# Patient Record
Sex: FEMALE | Race: WHITE | NOT HISPANIC OR LATINO | Employment: FULL TIME | URBAN - METROPOLITAN AREA
[De-identification: names, ages, dates, MRNs, and addresses within clinical notes are randomized per-mention and may not be internally consistent; named-entity substitution may affect disease eponyms.]

---

## 2017-01-02 DIAGNOSIS — E78.5 DYSLIPIDEMIA: ICD-10-CM

## 2017-01-03 RX ORDER — ATORVASTATIN CALCIUM 10 MG/1
TABLET, FILM COATED ORAL
Qty: 90 TAB | Refills: 3 | Status: SHIPPED | OUTPATIENT
Start: 2017-01-03 | End: 2017-03-29

## 2017-01-09 ENCOUNTER — NON-PROVIDER VISIT (OUTPATIENT)
Dept: HEALTH INFORMATION MANAGEMENT | Facility: MEDICAL CENTER | Age: 47
End: 2017-01-09
Payer: COMMERCIAL

## 2017-01-09 VITALS — HEIGHT: 63 IN | BODY MASS INDEX: 34.18 KG/M2 | WEIGHT: 192.9 LBS

## 2017-01-09 DIAGNOSIS — E11.9 DIABETES MELLITUS WITHOUT COMPLICATION (HCC): ICD-10-CM

## 2017-01-09 PROCEDURE — 97803 MED NUTRITION INDIV SUBSEQ: CPT | Performed by: DIETITIAN, REGISTERED

## 2017-01-09 NOTE — PROGRESS NOTES
"Nutrition Reassess    1/9/2017    CORRY Garcia   46 y.o.   River: in/out:  0543 - 8589    Subjective:  -Working on fiber/water intake and reports regularity on most days now  -Had restarted going to the gym until she hurt her back last week - eager to restart this soon  -Is getting better with choices of foods and how much she is eating  -Most recent BG's of 120-165 mg/dL, per patient    Anthropometrics/Objective  Filed Vitals:    01/09/17 1527   Height: 1.6 m (5' 3\")   Weight: 87.499 kg (192 lb 14.4 oz)     Body mass index is 34.18 kg/(m^2).    Previous weight/date: -194.1# Change:  - 1.2# (+ 2.9# total)     ReAssesment/Notes:  Dora is doing a great job of thinking through choices and finding out that she can still enjoy life without compromising her health. Her drop in her HbA1c is great because she has done it through improved dietary and exercise habits, rather than through medications.  I have encouraged her to take her Vitamin D supplementation as Vitamin D has very important roles in the body, including DM control.  She will restart exercise at the gym after she sees her chiropractor; she thinks she will be released to walk and she will wait to work out with her  until her back is completely healed, which I agree with.  I showed her how to read part of the food label today, starting with serving size, trans fat, and total CHO content of the foods she is consuming.  At next f/u I will assess how she is feeling with her choices because she admits that she is not completely comfortable with her dietary choices at this time.    Follow-up: 1 month      "

## 2017-01-09 NOTE — MR AVS SNAPSHOT
"        Dora Sheldon   2017 3:00 PM   Non-Provider Visit   MRN: [id number]    Department:  [facility]   Dept Phone:  [phone]    Description:  Female : 1970   Provider:  Howard Marks RD           Reason for Visit     Diabetes     Obesity           Allergies as of 2017     No Known Allergies      Vital Signs     Height Weight Body Mass Index Smoking Status          1.6 m (5' 3\") 87.499 kg (192 lb 14.4 oz) 34.18 kg/m2 Never Smoker         Basic Information     Date Of Birth Sex Race Ethnicity Preferred Language    1970 Female White Non- English      Your appointments     2017  4:00 PM   Follow Up Visit with Howard Marks RD   [facility])    [address]  [address]  [facility] [id number] [phone]           It is the patient's responsibility to check with your Insurance for benefit coverage for visit / visits.  24 hours notice is required for all appointment changes or cancellation.  Please arrive 20 min. before your appointment time  Please bring the following with you: 1)Picture Id 2) Insurance card 3) Completed Forms if New Patient  If scheduled for DIABETES VISIT please also brin) Medications 2) Meter 3) Blood glucose logs 4) Any recent labs if you have them  If scheduled for NUTRITION VISIT please also brin) 2-3 days of detailed food intake logs 2) Blood glucose monitor and blood glucose logs (if you have them)              Problem List              ICD-10-CM Priority Class Noted - Resolved    Essential hypertension I10   Unknown - Present    Dyslipidemia E78.5   Unknown - Present    Diabetes mellitus, type 2 (HCC) E11.9   Unknown - Present    Microalbuminuria R80.9   2014 - Present    Anxiety F41.9   Unknown - Present    TED on CPAP G47.33   3/23/2016 - Present    Vitamin D deficiency E55.9   2016 - Present    Chronic dryness of both eyes H04.123   2016 - Present    Obesity (BMI 30.0-34.9) E66.9   " 12/27/2016 - Present      Health Maintenance        Date Due Completion Dates    IMM HEP B VACCINE (1 of 3 - Primary Series) 1970 ---    IMM DTaP/Tdap/Td Vaccine (1 - Tdap) 8/19/1988 ---    IMM PNEUMOCOCCAL 19-64 (ADULT) MEDIUM RISK SERIES (1 of 1 - PPSV23) 8/19/1988 ---    RETINAL SCREENING 8/25/2016 8/25/2015, 8/28/2014    IMM INFLUENZA (1) 9/1/2017 (Originally 9/1/2016) ---    DIABETES MONOFILAMTENT / LE EXAM 3/22/2017 3/22/2016 (Done), 3/26/2015 (Done), 6/11/2014, 2/12/2014 (Done)    Override on 3/22/2016: Done    Override on 3/26/2015: Done    Override on 2/12/2014: Done    A1C SCREENING 6/23/2017 12/23/2016, 9/20/2016, 3/22/2016, 9/30/2015, 9/30/2015 (Done), 3/17/2015, 9/24/2014, 5/29/2014, 1/23/2014    Override on 9/30/2015: Done    URINE ACR / MICROALBUMIN 9/20/2017 9/20/2016, 3/18/2016, 3/17/2015, 9/24/2014, 5/29/2014    MAMMOGRAM 10/5/2017 10/5/2016 (Done), 8/5/2015 (Done), 8/30/2013 (Done), 1/15/2013 (Done)    Override on 10/5/2016: Done    Override on 8/5/2015: Done    Override on 8/30/2013: Done    Override on 1/15/2013: Done    FASTING LIPID PROFILE 12/23/2017 12/23/2016, 3/18/2016, 3/17/2015, 5/29/2014, 1/23/2014    SERUM CREATININE 12/23/2017 12/23/2016, 3/18/2016, 3/17/2015, 9/24/2014, 5/29/2014, 1/23/2014    PAP SMEAR 10/3/2019 10/3/2016 (Done), 8/10/2013 (Done)    Override on 10/3/2016: Done    Override on 8/10/2013: Done            Current Immunizations     No immunizations on file.      Below and/or attached are the medications your provider expects you to take. Review all of your home medications and newly ordered medications with your provider and/or pharmacist. Follow medication instructions as directed by your provider and/or pharmacist. Please keep your medication list with you and share with your provider. Update the information when medications are discontinued, doses are changed, or new medications (including over-the-counter products) are added; and carry medication information at  all times in the event of emergency situations     Allergies:  No Known Allergies          Medications  Valid as of: January 09, 2017 -  3:28 PM    Generic Name Brand Name Tablet Size Instructions for use    Atorvastatin Calcium (Tab) LIPITOR 10 MG TAKE 1 TAB BY MOUTH EVERY DAY.        Blood Glucose Monitoring Suppl (Misc) Blood Glucose Monitoring Suppl SUPPLIES Test strips order: Test strips for One Touch Ultra 2 meter. Sig: use twice daily and prn ssx high or low sugar #100 RF x 3        Ergocalciferol (Cap) DRISDOL 78937 UNITS Take 1 Cap by mouth every 7 days.        Lancets (Misc) Lancets  1 Each by Does not apply route 3 times a day.        Levonorgestrel (IUD) MIRENA 20 MCG/24HR 1 Each by Intrauterine route Once.        Lisinopril-Hydrochlorothiazide (Tab) PRINZIDE, ZESTORETIC 20-25 MG TAKE 1 TAB BY MOUTH EVERY DAY.        .                 Medicines prescribed today were sent to:     [facility]/PHARMACY #[id number] - [place], [place] - [address]    [address] [place] [address]    Phone: [phone] Fax: [phone]    Open 24 Hours?: No      Medication refill instructions:       If your prescription bottle indicates you have medication refills left, it is not necessary to call your provider’s office. Please contact your pharmacy and they will refill your medication.    If your prescription bottle indicates you do not have any refills left, you may request refills at any time through one of the following ways: The online [email or web address] system (except Urgent Care), by calling your provider’s office, or by asking your pharmacy to contact your provider’s office with a refill request. Medication refills are processed only during regular business hours and may not be available until the next business day. Your provider may request additional information or to have a follow-up visit with you prior to refilling your medication.   *Please Note: Medication refills are assigned a new Rx number when refilled electronically. Your  pharmacy may indicate that no refills were authorized even though a new prescription for the same medication is available at the pharmacy. Please request the medicine by name with the pharmacy before contacting your provider for a refill.           MyChart Access Code: Activation code not generated  Current [employer] Status: Active

## 2017-01-24 RX ORDER — LISINOPRIL AND HYDROCHLOROTHIAZIDE 25; 20 MG/1; MG/1
TABLET ORAL
Qty: 90 TAB | Refills: 3 | Status: SHIPPED | OUTPATIENT
Start: 2017-01-24 | End: 2017-06-01 | Stop reason: SDUPTHER

## 2017-02-13 ENCOUNTER — APPOINTMENT (OUTPATIENT)
Dept: HEALTH INFORMATION MANAGEMENT | Facility: MEDICAL CENTER | Age: 47
End: 2017-02-13
Payer: COMMERCIAL

## 2017-02-17 ENCOUNTER — NON-PROVIDER VISIT (OUTPATIENT)
Dept: HEALTH INFORMATION MANAGEMENT | Facility: MEDICAL CENTER | Age: 47
End: 2017-02-17
Payer: COMMERCIAL

## 2017-02-17 VITALS — BODY MASS INDEX: 33.29 KG/M2 | WEIGHT: 187.9 LBS | HEIGHT: 63 IN

## 2017-02-17 DIAGNOSIS — E11.9 DIABETES MELLITUS WITHOUT COMPLICATION (HCC): ICD-10-CM

## 2017-02-17 PROCEDURE — 97803 MED NUTRITION INDIV SUBSEQ: CPT | Performed by: DIETITIAN, REGISTERED

## 2017-02-17 NOTE — MR AVS SNAPSHOT
Dora Sheldon   2017 3:30 PM   Appointment   MRN: [id number]    Department:  [facility]   Dept Phone:  [phone]    Description:  Female : 1970   Provider:  Howard Marks RD           Allergies as of 2017     No Known Allergies      Vital Signs     Smoking Status                   Never Smoker            Basic Information     Date Of Birth Sex Race Ethnicity Preferred Language    1970 Female White Non- English      Problem List              ICD-10-CM Priority Class Noted - Resolved    Essential hypertension I10   Unknown - Present    Dyslipidemia E78.5   Unknown - Present    Diabetes mellitus, type 2 (CMS-HCC) E11.9   Unknown - Present    Microalbuminuria R80.9   2014 - Present    Anxiety F41.9   Unknown - Present    TED on CPAP G47.33   3/23/2016 - Present    Vitamin D deficiency E55.9   2016 - Present    Chronic dryness of both eyes H04.123   2016 - Present    Obesity (BMI 30.0-34.9) E66.9   2016 - Present      Health Maintenance        Date Due Completion Dates    IMM HEP B VACCINE (1 of 3 - Primary Series) 1970 ---    IMM DTaP/Tdap/Td Vaccine (1 - Tdap) 1988 ---    IMM PNEUMOCOCCAL 19-64 (ADULT) MEDIUM RISK SERIES (1 of 1 - PPSV23) 1988 ---    RETINAL SCREENING 2016, 2014    IMM INFLUENZA (1) 2017 (Originally 2016) ---    DIABETES MONOFILAMENT / LE EXAM 3/22/2017 3/22/2016 (Done), 3/26/2015 (Done), 2014, 2014 (Done)    Override on 3/22/2016: Done    Override on 3/26/2015: Done    Override on 2014: Done    A1C SCREENING 2016, 2016, 3/22/2016, 2015, 2015 (Done), 3/17/2015, 2014, 2014, 2014    Override on 2015: Done    URINE ACR / MICROALBUMIN 2017, 3/18/2016, 3/17/2015, 2014, 2014    MAMMOGRAM 10/5/2017 10/5/2016 (Done), 2015 (Done), 2013 (Done), 1/15/2013 (Done)    Override on 10/5/2016: Done    Override on 8/5/2015: Done    Override on 8/30/2013: Done    Override on 1/15/2013: Done    FASTING LIPID PROFILE 12/23/2017 12/23/2016, 3/18/2016, 3/17/2015, 5/29/2014, 1/23/2014    SERUM CREATININE 12/23/2017 12/23/2016, 3/18/2016, 3/17/2015, 9/24/2014, 5/29/2014, 1/23/2014    PAP SMEAR 10/3/2019 10/3/2016 (Done), 8/10/2013 (Done)    Override on 10/3/2016: Done    Override on 8/10/2013: Done            Current Immunizations     No immunizations on file.      Below and/or attached are the medications your provider expects you to take. Review all of your home medications and newly ordered medications with your provider and/or pharmacist. Follow medication instructions as directed by your provider and/or pharmacist. Please keep your medication list with you and share with your provider. Update the information when medications are discontinued, doses are changed, or new medications (including over-the-counter products) are added; and carry medication information at all times in the event of emergency situations     Allergies:  No Known Allergies          Medications  Valid as of: February 17, 2017 -  3:59 PM    Generic Name Brand Name Tablet Size Instructions for use    Atorvastatin Calcium (Tab) LIPITOR 10 MG TAKE 1 TAB BY MOUTH EVERY DAY.        Blood Glucose Monitoring Suppl (Misc) Blood Glucose Monitoring Suppl SUPPLIES Test strips order: Test strips for One Touch Ultra 2 meter. Sig: use twice daily and prn ssx high or low sugar #100 RF x 3        Ergocalciferol (Cap) DRISDOL 37357 UNITS Take 1 Cap by mouth every 7 days.        Lancets (Misc) Lancets  1 Each by Does not apply route 3 times a day.        Levonorgestrel (IUD) MIRENA 20 MCG/24HR 1 Each by Intrauterine route Once.        Lisinopril-Hydrochlorothiazide (Tab) PRINZIDE, ZESTORETIC 20-25 MG TAKE 1 TAB BY MOUTH EVERY DAY.        .                 Medicines prescribed today were sent to:     [facility]/PHARMACY #[id number] - [place], [place] - [address] [place] [address]    [address] [place]  [place] [address]    Phone: [phone] Fax: [phone]    Open 24 Hours?: No      Medication refill instructions:       If your prescription bottle indicates you have medication refills left, it is not necessary to call your provider’s office. Please contact your pharmacy and they will refill your medication.    If your prescription bottle indicates you do not have any refills left, you may request refills at any time through one of the following ways: The online [employer] system (except Urgent Care), by calling your provider’s office, or by asking your pharmacy to contact your provider’s office with a refill request. Medication refills are processed only during regular business hours and may not be available until the next business day. Your provider may request additional information or to have a follow-up visit with you prior to refilling your medication.   *Please Note: Medication refills are assigned a new Rx number when refilled electronically. Your pharmacy may indicate that no refills were authorized even though a new prescription for the same medication is available at the pharmacy. Please request the medicine by name with the pharmacy before contacting your provider for a refill.           [employer] Access Code: Activation code not generated  Current [employer] Status: Active

## 2017-02-18 NOTE — PROGRESS NOTES
"Nutrition Reassess    2/17/2017    CORRY Garcia   46 y.o.   Time in/out:  1537 - 1558    Subjective:  -Exercising on 2 days each week and wants to increase activity further soon  -States she is getting better and thinking through her choices more    Anthropometrics/Objective  Filed Vitals:    02/17/17 1643   Height: 1.6 m (5' 3\")   Weight: 85.231 kg (187 lb 14.4 oz)     Body mass index is 33.29 kg/(m^2).    Previous weight/date: 192.9#  Change:  - 3.2# (- 0.3# total)     ReAssesment/Notes:  I want Dora to stay the course at this time.  She assesses what she is eating and makes better choices when she knows that she needs to and I want that to continue.  Her exercise level can be increased and she is willing to increase her activity to 3 days/week to help add more \"medicine\" for controlling her BG's.  I want to see her again after her next appointment with her PCP, where she will have new labs drawn.    Follow-up: 4-6 weeks      "

## 2017-03-29 ENCOUNTER — OFFICE VISIT (OUTPATIENT)
Dept: MEDICAL GROUP | Facility: PHYSICIAN GROUP | Age: 47
End: 2017-03-29
Payer: COMMERCIAL

## 2017-03-29 VITALS
HEIGHT: 63 IN | OXYGEN SATURATION: 98 % | RESPIRATION RATE: 16 BRPM | WEIGHT: 190 LBS | HEART RATE: 81 BPM | SYSTOLIC BLOOD PRESSURE: 120 MMHG | DIASTOLIC BLOOD PRESSURE: 74 MMHG | BODY MASS INDEX: 33.66 KG/M2 | TEMPERATURE: 97.3 F

## 2017-03-29 DIAGNOSIS — E11.9 TYPE 2 DIABETES MELLITUS WITHOUT COMPLICATION, WITHOUT LONG-TERM CURRENT USE OF INSULIN (HCC): ICD-10-CM

## 2017-03-29 DIAGNOSIS — E55.9 VITAMIN D DEFICIENCY: ICD-10-CM

## 2017-03-29 LAB
HBA1C MFR BLD: 7.3 % (ref ?–5.8)
INT CON NEG: ABNORMAL
INT CON POS: ABNORMAL

## 2017-03-29 PROCEDURE — 83036 HEMOGLOBIN GLYCOSYLATED A1C: CPT | Performed by: NURSE PRACTITIONER

## 2017-03-29 PROCEDURE — 99213 OFFICE O/P EST LOW 20 MIN: CPT | Performed by: NURSE PRACTITIONER

## 2017-03-29 RX ORDER — ATORVASTATIN CALCIUM 10 MG/1
TABLET, FILM COATED ORAL
COMMUNITY
Start: 2017-01-03 | End: 2017-03-29

## 2017-03-29 RX ORDER — ATORVASTATIN CALCIUM 10 MG/1
TABLET, FILM COATED ORAL
COMMUNITY
Start: 2017-03-02 | End: 2018-01-28

## 2017-03-29 ASSESSMENT — PATIENT HEALTH QUESTIONNAIRE - PHQ9: CLINICAL INTERPRETATION OF PHQ2 SCORE: 0

## 2017-03-29 NOTE — PROGRESS NOTES
Subjective:     Chief Complaint   Patient presents with   • Follow-Up     3 months       HPI  Dora Sheldon is a 46 y.o. female here today for routine 3 month follow up.     Vitamin D deficiency  She has completed the total 12 weeks of 50,000 units daily.    Ref. Range 12/23/2016 10:40   25-Hydroxy   Vitamin D 25 Latest Ref Range:  ng/mL 10 (L)       Diabetes mellitus, type 2  Established problem but is still not at goal. Patient has been trying to manage symptoms with her own efforts for the past 6 months through nutrition counseling, but her A1c is still greater than 7. Monitors glucose once daily. She continues to follow nutritionist Genaro who has tiffanie helping her with nutrition. She is very sedentary with her job. She exercises here and there.  Denies any polyuria, polydipsia, polyphagia, blurred or cloudy vision, rash or thrush, or numbness or tingling of feet. Last dilated retinal eye exam was November 2016     Ref. Range 9/20/2016 07:19 12/23/2016 10:40   Glycohemoglobin Latest Ref Range: 0.0-5.6 % 7.8 (H) 7.1 (H)   Estim. Avg Glu Latest Units: mg/dL 177 157          Diagnoses of Type 2 diabetes mellitus without complication, without long-term current use of insulin ([facility]) and Vitamin D deficiency were pertinent to this visit.    Allergies: Review of patient's allergies indicates no known allergies.  Current medicines (including changes today)  Current Outpatient Prescriptions   Medication Sig Dispense Refill   • Cholecalciferol (VITAMIN D3) 5000 UNITS Cap Take 1 Cap by mouth every day. 90 Cap 3   • metformin (GLUCOPHAGE) 500 MG Tab Take 1 Tab by mouth every day. 90 Tab 3   • lisinopril-hydrochlorothiazide (PRINZIDE, ZESTORETIC) 20-25 MG per tablet TAKE 1 TAB BY MOUTH EVERY DAY. 90 Tab 3   • Blood Glucose Monitoring Suppl SUPPLIES Misc Test strips order: Test strips for One Touch Ultra 2 meter. Sig: use twice daily and prn ssx high or low sugar #100 RF x 3 100 Each 3   • Lancets Misc 1 Each by Does not  "apply route 3 times a day. 100 Each 2   • levonorgestrel (MIRENA) 20 MCG/24HR IUD 1 Each by Intrauterine route Once.     • atorvastatin (LIPITOR) 10 MG Tab        No current facility-administered medications for this visit.       She  has a past medical history of HTN (hypertension); Dyslipidemia; Diabetes mellitus, type 2 (CMS-HCC); and Anxiety.    Health Maintenance: Is ill today and would like to wait until next visit for vaccines    ROS  As stated in HPI      Objective:     Blood pressure 120/74, pulse 81, temperature 36.3 °C (97.3 °F), resp. rate 16, height 1.6 m (5' 3\"), weight 86.183 kg (190 lb), SpO2 98 %. Body mass index is 33.67 kg/(m^2).  Physical Exam:  General: Alert, oriented, in no acute distress.  Eye contact is good, speech goal directed, affect calm  Oral mucous membranes pink and moist with no lesions. Oropharynx without erythema, or exudate.   Ext: no edema, normal color and temperature.   Monofilament testing with a 10 gram force: sensation intact: intact bilaterally  Visual Inspection: Feet without maceration, ulcers, fissures.  Pedal pulses: intact bilaterally    Results for PINO TAMEZ (MRN [id number]) as of 3/29/2017 09:15   Ref. Range 3/29/2017 08:47   Glycohemoglobin Unknown 7.3       Assessment and Plan:   The following treatment plan was discussed  1. Type 2 diabetes mellitus without complication, without long-term current use of insulin ([facility])  not controlled. Start metformin 500 mg once daily. Continue statin, ACEI, nutrition counseling, and encouraged increasing exercise. Educated patient on disease process and ways to reverse the insulin resistance she is experiencing  POCT A1C    metformin (GLUCOPHAGE) 500 MG Tab   2. Vitamin D deficiency  switch to OTC vitamin D 5000 units daily  Cholecalciferol (VITAMIN D3) 5000 UNITS Cap       Followup: Return in about 3 months (around 6/29/2017). sooner should new symptoms or problems arise.           "

## 2017-03-29 NOTE — MR AVS SNAPSHOT
"Dora Sheldon   3/29/2017 8:20 AM   Office Visit   MRN: [id number]    Department:  [facility]   Dept Phone:  [phone]    Description:  Female : 1970   Provider:  CORRY Garcia           Reason for Visit     Follow-Up 3 months      Allergies as of 3/29/2017     No Known Allergies      You were diagnosed with     Type 2 diabetes mellitus without complication, without long-term current use of insulin (CMS-HCC)   [7754976]       Vitamin D deficiency   [2834141]         Vital Signs     Blood Pressure Pulse Temperature Respirations Height Weight    120/74 mmHg 81 36.3 °C (97.3 °F) 16 1.6 m (5' 3\") 86.183 kg (190 lb)    Body Mass Index Oxygen Saturation Smoking Status             33.67 kg/m2 98% Never Smoker          Basic Information     Date Of Birth Sex Race Ethnicity Preferred Language    1970 Female White Non- English      Your appointments     2017  8:20 AM   Established Patient with CORRY Garcia   [address] [address]   [phone]           You will be receiving a confirmation call a few days before your appointment from our automated call confirmation system.              Problem List              ICD-10-CM Priority Class Noted - Resolved    Essential hypertension I10   Unknown - Present    Dyslipidemia E78.5   Unknown - Present    Diabetes mellitus, type 2 (CMS-HCC) E11.9   Unknown - Present    Microalbuminuria R80.9   2014 - Present    Anxiety F41.9   Unknown - Present    TED on CPAP G47.33   3/23/2016 - Present    Vitamin D deficiency E55.9   2016 - Present    Chronic dryness of both eyes H04.123   2016 - Present    Obesity (BMI 30.0-34.9) E66.9   2016 - Present      Health Maintenance        Date Due Completion Dates    IMM HEP B VACCINE (1 of 3 - Primary Series) 1970 ---    IMM DTaP/Tdap/Td Vaccine (1 - Tdap) 1988 ---    IMM PNEUMOCOCCAL 19-64 (ADULT) MEDIUM RISK " SERIES (1 of 1 - PPSV23) 8/19/1988 ---    RETINAL SCREENING 8/25/2016 8/25/2015, 8/28/2014    DIABETES MONOFILAMENT / LE EXAM 3/22/2017 3/22/2016 (Done), 3/26/2015 (Done), 6/11/2014, 2/12/2014 (Done)    Override on 3/22/2016: Done    Override on 3/26/2015: Done    Override on 2/12/2014: Done    IMM INFLUENZA (1) 9/1/2017 (Originally 9/1/2016) ---    A1C SCREENING 6/23/2017 12/23/2016, 9/20/2016, 3/22/2016, 9/30/2015, 9/30/2015 (Done), 3/17/2015, 9/24/2014, 5/29/2014, 1/23/2014    Override on 9/30/2015: Done    URINE ACR / MICROALBUMIN 9/20/2017 9/20/2016, 3/18/2016, 3/17/2015, 9/24/2014, 5/29/2014    MAMMOGRAM 10/5/2017 10/5/2016 (Done), 8/5/2015 (Done), 8/30/2013 (Done), 1/15/2013 (Done)    Override on 10/5/2016: Done    Override on 8/5/2015: Done    Override on 8/30/2013: Done    Override on 1/15/2013: Done    FASTING LIPID PROFILE 12/23/2017 12/23/2016, 3/18/2016, 3/17/2015, 5/29/2014, 1/23/2014    SERUM CREATININE 12/23/2017 12/23/2016, 3/18/2016, 3/17/2015, 9/24/2014, 5/29/2014, 1/23/2014    PAP SMEAR 10/3/2019 10/3/2016 (Done), 8/10/2013 (Done)    Override on 10/3/2016: Done    Override on 8/10/2013: Done            Results     POCT A1C      Component    Glycohemoglobin    7.3    Internal Control Negative    Valid    Internal Control Positive    Valid                        Current Immunizations     No immunizations on file.      Below and/or attached are the medications your provider expects you to take. Review all of your home medications and newly ordered medications with your provider and/or pharmacist. Follow medication instructions as directed by your provider and/or pharmacist. Please keep your medication list with you and share with your provider. Update the information when medications are discontinued, doses are changed, or new medications (including over-the-counter products) are added; and carry medication information at all times in the event of emergency situations     Allergies:  No Known  Allergies          Medications  Valid as of: March 29, 2017 -  8:58 AM    Generic Name Brand Name Tablet Size Instructions for use    Atorvastatin Calcium (Tab) LIPITOR 10 MG         Blood Glucose Monitoring Suppl (Misc) Blood Glucose Monitoring Suppl SUPPLIES Test strips order: Test strips for One Touch Ultra 2 meter. Sig: use twice daily and prn ssx high or low sugar #100 RF x 3        Cholecalciferol (Cap) vitamin D3 5000 UNITS Take 1 Cap by mouth every day.        Lancets (Misc) Lancets  1 Each by Does not apply route 3 times a day.        Levonorgestrel (IUD) MIRENA 20 MCG/24HR 1 Each by Intrauterine route Once.        Lisinopril-Hydrochlorothiazide (Tab) PRINZIDE, ZESTORETIC 20-25 MG TAKE 1 TAB BY MOUTH EVERY DAY.        MetFORMIN HCl (Tab) GLUCOPHAGE 500 MG Take 1 Tab by mouth every day.        .                 Medicines prescribed today were sent to:     [facility]/PHARMACY #[id number] - [place], [place] - [address]    [address] [place] [address]    Phone: [phone] Fax: [phone]    Open 24 Hours?: No      Medication refill instructions:       If your prescription bottle indicates you have medication refills left, it is not necessary to call your provider’s office. Please contact your pharmacy and they will refill your medication.    If your prescription bottle indicates you do not have any refills left, you may request refills at any time through one of the following ways: The online [employer] system (except Urgent Care), by calling your provider’s office, or by asking your pharmacy to contact your provider’s office with a refill request. Medication refills are processed only during regular business hours and may not be available until the next business day. Your provider may request additional information or to have a follow-up visit with you prior to refilling your medication.   *Please Note: Medication refills are assigned a new Rx number when refilled electronically. Your pharmacy may indicate that no  refills were authorized even though a new prescription for the same medication is available at the pharmacy. Please request the medicine by name with the pharmacy before contacting your provider for a refill.           [employer] Access Code: Activation code not generated  Current [employer] Status: Active

## 2017-03-29 NOTE — ASSESSMENT & PLAN NOTE
Established problem but is still not at goal. Patient has been trying to manage symptoms with her own efforts for the past 6 months through nutrition counseling, but her A1c is still greater than 7. Monitors glucose once daily. She continues to follow nutritionist Genaro who has tiffanie helping her with nutrition. She is very sedentary with her job. She exercises here and there.  Denies any polyuria, polydipsia, polyphagia, blurred or cloudy vision, rash or thrush, or numbness or tingling of feet. Last dilated retinal eye exam was November 2016     Ref. Range 9/20/2016 07:19 12/23/2016 10:40   Glycohemoglobin Latest Ref Range: 0.0-5.6 % 7.8 (H) 7.1 (H)   Estim. Avg Glu Latest Units: mg/dL 177 157

## 2017-03-29 NOTE — ASSESSMENT & PLAN NOTE
She has completed the total 12 weeks of 50,000 units daily.    Ref. Range 12/23/2016 10:40   25-Hydroxy   Vitamin D 25 Latest Ref Range:  ng/mL 10 (L)

## 2017-06-01 RX ORDER — LISINOPRIL AND HYDROCHLOROTHIAZIDE 25; 20 MG/1; MG/1
TABLET ORAL
Qty: 90 TAB | Refills: 3 | Status: SHIPPED | OUTPATIENT
Start: 2017-06-01 | End: 2018-05-29 | Stop reason: SDUPTHER

## 2017-06-29 ENCOUNTER — OFFICE VISIT (OUTPATIENT)
Dept: MEDICAL GROUP | Facility: PHYSICIAN GROUP | Age: 47
End: 2017-06-29
Payer: COMMERCIAL

## 2017-06-29 VITALS
HEIGHT: 63 IN | WEIGHT: 188 LBS | HEART RATE: 66 BPM | SYSTOLIC BLOOD PRESSURE: 118 MMHG | TEMPERATURE: 98 F | RESPIRATION RATE: 12 BRPM | BODY MASS INDEX: 33.31 KG/M2 | OXYGEN SATURATION: 97 % | DIASTOLIC BLOOD PRESSURE: 70 MMHG

## 2017-06-29 DIAGNOSIS — E11.9 TYPE 2 DIABETES MELLITUS WITHOUT COMPLICATION, WITHOUT LONG-TERM CURRENT USE OF INSULIN (HCC): ICD-10-CM

## 2017-06-29 DIAGNOSIS — F90.9 HYPERACTIVITY (BEHAVIOR): ICD-10-CM

## 2017-06-29 LAB
HBA1C MFR BLD: 6.7 % (ref ?–5.8)
INT CON NEG: ABNORMAL
INT CON POS: ABNORMAL

## 2017-06-29 PROCEDURE — 99213 OFFICE O/P EST LOW 20 MIN: CPT | Performed by: NURSE PRACTITIONER

## 2017-06-29 PROCEDURE — 83036 HEMOGLOBIN GLYCOSYLATED A1C: CPT | Performed by: NURSE PRACTITIONER

## 2017-06-29 NOTE — MR AVS SNAPSHOT
"Dora Sheldon   2017 8:20 AM   Office Visit   MRN: [id number]    Department:  [facility]   Dept Phone:  [phone]    Description:  Female : 1970   Provider:  CORRY Garcia           Reason for Visit     Follow-Up 3 months      Allergies as of 2017     No Known Allergies      You were diagnosed with     Type 2 diabetes mellitus without complication, without long-term current use of insulin (CMS-HCC)   [6254618]       Hyperactivity (behavior)   [543000]         Vital Signs     Blood Pressure Pulse Temperature Respirations Height Weight    118/70 mmHg 66 36.7 °C (98 °F) 12 1.6 m (5' 3\") 85.276 kg (188 lb)    Body Mass Index Oxygen Saturation Smoking Status             33.31 kg/m2 97% Never Smoker          Basic Information     Date Of Birth Sex Race Ethnicity Preferred Language    1970 Female White Non- English      Your appointments     Sep 20, 2017  7:40 AM   Established Patient with CORRY Garcia   [address] [address] [phone]           You will be receiving a confirmation call a few days before your appointment from our automated call confirmation system.              Problem List              ICD-10-CM Priority Class Noted - Resolved    Essential hypertension I10   Unknown - Present    Dyslipidemia E78.5   Unknown - Present    Diabetes mellitus, type 2 (CMS-HCC) E11.9   Unknown - Present    Microalbuminuria R80.9   2014 - Present    Hyperactivity (behavior) F90.9   Unknown - Present    TED on CPAP G47.33, Z99.89   3/23/2016 - Present    Vitamin D deficiency E55.9   2016 - Present    Chronic dryness of both eyes H04.123   2016 - Present    Obesity (BMI 30.0-34.9) E66.9   2016 - Present      Health Maintenance        Date Due Completion Dates    IMM HEP B VACCINE (1 of 3 - Primary Series) 1970 ---    IMM DTaP/Tdap/Td Vaccine (1 - Tdap) 1988 ---    IMM PNEUMOCOCCAL " 19-64 (ADULT) MEDIUM RISK SERIES (1 of 1 - PPSV23) 8/19/1988 ---    RETINAL SCREENING 8/25/2016 8/25/2015, 8/28/2014    URINE ACR / MICROALBUMIN 9/20/2017 9/20/2016, 3/18/2016, 3/17/2015, 9/24/2014, 5/29/2014    A1C SCREENING 9/29/2017 3/29/2017, 12/23/2016, 9/20/2016, 3/22/2016, 9/30/2015, 9/30/2015 (Done), 3/17/2015, 9/24/2014, 5/29/2014, 1/23/2014    Override on 9/30/2015: Done    MAMMOGRAM 10/5/2017 10/5/2016 (Done), 8/5/2015 (Done), 8/30/2013 (Done), 1/15/2013 (Done)    Override on 10/5/2016: Done    Override on 8/5/2015: Done    Override on 8/30/2013: Done    Override on 1/15/2013: Done    FASTING LIPID PROFILE 12/23/2017 12/23/2016, 3/18/2016, 3/17/2015, 5/29/2014, 1/23/2014    SERUM CREATININE 12/23/2017 12/23/2016, 3/18/2016, 3/17/2015, 9/24/2014, 5/29/2014, 1/23/2014    DIABETES MONOFILAMENT / LE EXAM 3/29/2018 3/29/2017 (Done), 3/22/2016 (Done), 3/26/2015 (Done), 6/11/2014, 2/12/2014 (Done)    Override on 3/29/2017: Done    Override on 3/22/2016: Done    Override on 3/26/2015: Done    Override on 2/12/2014: Done    PAP SMEAR 10/3/2019 10/3/2016 (Done), 8/10/2013 (Done)    Override on 10/3/2016: Done    Override on 8/10/2013: Done            Results     POCT Hemoglobin A1C      Component    Glycohemoglobin    6.7    Internal Control Negative    Valid    Internal Control Positive    Valid                        Current Immunizations     No immunizations on file.      Below and/or attached are the medications your provider expects you to take. Review all of your home medications and newly ordered medications with your provider and/or pharmacist. Follow medication instructions as directed by your provider and/or pharmacist. Please keep your medication list with you and share with your provider. Update the information when medications are discontinued, doses are changed, or new medications (including over-the-counter products) are added; and carry medication information at all times in the event of emergency  situations     Allergies:  No Known Allergies          Medications  Valid as of: June 29, 2017 - 11:27 AM    Generic Name Brand Name Tablet Size Instructions for use    Atorvastatin Calcium (Tab) LIPITOR 10 MG         Blood Glucose Monitoring Suppl (Misc) Blood Glucose Monitoring Suppl SUPPLIES Test strips order: Test strips for One Touch Ultra 2 meter. Sig: use twice daily and prn ssx high or low sugar #100 RF x 3        Cholecalciferol (Cap) vitamin D3 5000 UNITS Take 1 Cap by mouth every day.        Lancets (Misc) Lancets  1 Each by Does not apply route 3 times a day.        Levonorgestrel (IUD) MIRENA 20 MCG/24HR 1 Each by Intrauterine route Once.        Lisinopril-Hydrochlorothiazide (Tab) PRINZIDE, ZESTORETIC 20-25 MG TAKE 1 TAB BY MOUTH EVERY DAY.        MetFORMIN HCl (Tab) GLUCOPHAGE 500 MG Take 1 Tab by mouth every day.        .                 Medicines prescribed today were sent to:     [facility]ARMACY #[id number] - [place], [place] - [address]    [address] [place] [address]    Phone: [phone] Fax: [phone]    Open 24 Hours?: No      Medication refill instructions:       If your prescription bottle indicates you have medication refills left, it is not necessary to call your provider’s office. Please contact your pharmacy and they will refill your medication.    If your prescription bottle indicates you do not have any refills left, you may request refills at any time through one of the following ways: The online [employer] system (except Urgent Care), by calling your provider’s office, or by asking your pharmacy to contact your provider’s office with a refill request. Medication refills are processed only during regular business hours and may not be available until the next business day. Your provider may request additional information or to have a follow-up visit with you prior to refilling your medication.   *Please Note: Medication refills are assigned a new Rx number when refilled electronically.  Your pharmacy may indicate that no refills were authorized even though a new prescription for the same medication is available at the pharmacy. Please request the medicine by name with the pharmacy before contacting your provider for a refill.           MyChart Access Code: Activation code not generated  Current [employer] Status: Active

## 2017-06-29 NOTE — PROGRESS NOTES
Subjective:     Chief Complaint   Patient presents with   • Follow-Up     3 months       HPI  Dora Sheldon is a 46 y.o. female here today for DM focused f/u    Diabetes mellitus, type 2  Ongoing issue that has improved. Managed with metformin 500 mg daily. AM fasting glucose 162-180, PM before dinner is generally lower. No hypoglycemia episodes, but when sugar is <100, she does feel nauseated and shaky.   She is very sedentary with her job, but has been walking and bicycle riding to get her moving. She does admit to 1-2 drinks a night, often wine. She has started [employer] nutrition supplementation with cinnamix to help with sugar cravings. She does follow nutritionist regularly still who has helped a lot.   Denies any polyuria, polydipsia, polyphagia, blurred or cloudy vision, rash or thrush, or numbness or tingling of feet. Last dilated retinal eye exam was August 2016    Statin: yes  ACEI: yes      Ref. Range 9/20/2016 07:19 12/23/2016 10:40 3/29/2017 08:47 6/29/2017 08:42   Glycohemoglobin Unknown 7.8 (H) 7.1 (H) 7.3 6.7   Estim. Avg Glu Latest Units: mg/dL 177 157         Hyperactivity (behavior)  Ongoing problem. Patient reports a very stressful job. She often commits to social activities where she has to coordinate the events. She does not have any outlet for stress relief. Denies anxiety, but feels mind racing frequently          Diagnoses of Type 2 diabetes mellitus without complication, without long-term current use of insulin (CMS-[facility]) and Hyperactivity (behavior) were pertinent to this visit.    Allergies: Review of patient's allergies indicates no known allergies.  Current medicines (including changes today)  Current Outpatient Prescriptions   Medication Sig Dispense Refill   • lisinopril-hydrochlorothiazide (PRINZIDE, ZESTORETIC) 20-25 MG per tablet TAKE 1 TAB BY MOUTH EVERY DAY. 90 Tab 3   • atorvastatin (LIPITOR) 10 MG Tab      • Cholecalciferol (VITAMIN D3) 5000 UNITS Cap Take 1 Cap by mouth every day. 90  "Cap 3   • metformin (GLUCOPHAGE) 500 MG Tab Take 1 Tab by mouth every day. 90 Tab 3   • levonorgestrel (MIRENA) 20 MCG/24HR IUD 1 Each by Intrauterine route Once.     • Blood Glucose Monitoring Suppl SUPPLIES Misc Test strips order: Test strips for One Touch Ultra 2 meter. Sig: use twice daily and prn ssx high or low sugar #100 RF x 3 100 Each 3   • Lancets Misc 1 Each by Does not apply route 3 times a day. 100 Each 2     No current facility-administered medications for this visit.       She  has a past medical history of HTN (hypertension); Dyslipidemia; Diabetes mellitus, type 2 (CMS-[facility]); and Anxiety.    Health Maintenance: vaccinations due, declines today     ROS  As stated in HPI and additionally  General: No fatigue or insomnia  Psych: No depression      Objective:     Blood pressure 118/70, pulse 66, temperature 36.7 °C (98 °F), resp. rate 12, height 1.6 m (5' 3\"), weight 85.276 kg (188 lb), SpO2 97 %. Body mass index is 33.31 kg/(m^2).  Physical Exam:  General: Alert, oriented, in no acute distress.  Eye contact is good, speech goal directed, affect talkative, hyperactive (her baseline)  Gross hearing intact.  Oral mucous membranes pink and moist with no lesions. Oropharynx without erythema, or exudate.   Feet without dryness, lesions, or ulcers  Gait steady.     Assessment and Plan:   Assessment/Plan:  1. Type 2 diabetes mellitus without complication, without long-term current use of insulin (CMS-HCC)  Improving. Very motivated with diet changes, but still sedentary for the most part. Enc exercise. Continue nutrition counseling. Change metformin to 250 mg BID. Enc management of stress levels to help manage sugars. F/u A1C in 3 months   - POCT Hemoglobin A1C    2. Hyperactivity (behavior)  Ongoing problem. Discussed relaxation techniques, stress relief outlets, increasing exercise        Follow up:  Return in about 3 months (around 9/29/2017).    Educated in proper administration of medication(s) ordered " today including safety, possible SE, risks, benefits, rationale and alternatives to therapy.   Supportive care, differential diagnoses, and indications for immediate follow-up discussed with patient.    Pathogenesis of diagnosis discussed including typical length and natural progression.    Instructed to return to clinic or nearest emergency department for any change in condition, further concerns, or worsening of symptoms.  Patient states understanding of the plan of care and discharge instructions.      Please note that this dictation was created using voice recognition software. I have made every reasonable attempt to correct obvious errors, but I expect that there are errors of grammar and possibly content that I did not discover before finalizing the note.    Followup: Return in about 3 months (around 9/29/2017). sooner should new symptoms or problems arise.

## 2017-06-29 NOTE — ASSESSMENT & PLAN NOTE
Ongoing issue that has improved. Managed with metformin 500 mg daily. AM fasting glucose 162-180, PM before dinner is generally lower. No hypoglycemia episodes, but when sugar is <100, she does feel nauseated and shaky.   She is very sedentary with her job, but has been walking and bicycle riding to get her moving. She does admit to 1-2 drinks a night, often wine. She has started Sol[employer] nutrition supplementation with cinnamix to help with sugar cravings. She does follow nutritionist regularly still who has helped a lot.   Denies any polyuria, polydipsia, polyphagia, blurred or cloudy vision, rash or thrush, or numbness or tingling of feet. Last dilated retinal eye exam was August 2016    Statin: yes  ACEI: yes      Ref. Range 9/20/2016 07:19 12/23/2016 10:40 3/29/2017 08:47 6/29/2017 08:42   Glycohemoglobin Unknown 7.8 (H) 7.1 (H) 7.3 6.7   Estim. Avg Glu Latest Units: mg/dL 177 157

## 2017-06-29 NOTE — ASSESSMENT & PLAN NOTE
Ongoing problem. Patient reports a very stressful job. She often commits to social activities where she has to coordinate the events. She does not have any outlet for stress relief. Denies anxiety, but feels mind racing frequently

## 2017-08-30 ENCOUNTER — PATIENT MESSAGE (OUTPATIENT)
Dept: MEDICAL GROUP | Facility: PHYSICIAN GROUP | Age: 47
End: 2017-08-30

## 2017-09-18 ENCOUNTER — PATIENT MESSAGE (OUTPATIENT)
Dept: MEDICAL GROUP | Facility: PHYSICIAN GROUP | Age: 47
End: 2017-09-18

## 2017-09-20 ENCOUNTER — OFFICE VISIT (OUTPATIENT)
Dept: MEDICAL GROUP | Facility: PHYSICIAN GROUP | Age: 47
End: 2017-09-20
Payer: COMMERCIAL

## 2017-09-20 VITALS
HEART RATE: 66 BPM | SYSTOLIC BLOOD PRESSURE: 130 MMHG | DIASTOLIC BLOOD PRESSURE: 76 MMHG | RESPIRATION RATE: 12 BRPM | OXYGEN SATURATION: 98 % | HEIGHT: 63 IN | TEMPERATURE: 98 F | WEIGHT: 188 LBS | BODY MASS INDEX: 33.31 KG/M2

## 2017-09-20 DIAGNOSIS — I10 ESSENTIAL HYPERTENSION: ICD-10-CM

## 2017-09-20 DIAGNOSIS — K92.1 BLOOD IN STOOL: ICD-10-CM

## 2017-09-20 DIAGNOSIS — E11.9 TYPE 2 DIABETES MELLITUS WITHOUT COMPLICATION, WITHOUT LONG-TERM CURRENT USE OF INSULIN (HCC): ICD-10-CM

## 2017-09-20 DIAGNOSIS — E55.9 VITAMIN D DEFICIENCY: ICD-10-CM

## 2017-09-20 LAB
HBA1C MFR BLD: 7.1 % (ref ?–5.8)
INT CON NEG: ABNORMAL
INT CON POS: ABNORMAL

## 2017-09-20 PROCEDURE — 83036 HEMOGLOBIN GLYCOSYLATED A1C: CPT | Performed by: NURSE PRACTITIONER

## 2017-09-20 PROCEDURE — 99214 OFFICE O/P EST MOD 30 MIN: CPT | Performed by: NURSE PRACTITIONER

## 2017-09-20 RX ORDER — ATORVASTATIN CALCIUM 10 MG/1
TABLET, FILM COATED ORAL
COMMUNITY
Start: 2017-07-15 | End: 2017-09-20

## 2017-09-20 NOTE — ASSESSMENT & PLAN NOTE
She continues to take vitamin D 5,000 units daily. She is due for labs for monitoring as last level was very low at 10.

## 2017-09-20 NOTE — PROGRESS NOTES
Subjective:     Chief Complaint   Patient presents with   • Follow-Up     3 months       HPI  Dora Sheldon is a 47 y.o. female here today for routine f/u. Concern today is blood in stool sometimes.  Started about one month ago, occurring 1-2 days/week. She finds bright new blood on stool when she wipes. There is no pain with bowel movement or pain with wiping. Has been using Metamucil regularly and has increased fiber intake significantly. Denies any weight loss, F/C, fatigue or malaise. No lower abdominal pain or cramping, diarrhea, melena, or change in bowel habits. No family hx of inflammatory bowel disease.     Essential hypertension  Chronic problem well controlled on current medications. Does not monitor blood pressure at home. Denies any severe headache, dizziness, vision changes, chest pain, BRANDT, palpitations, or lower extremity edema      Diabetes mellitus, type 2  Controlled: yes    Medications: metformin 250 mg BID     Glucose at home: fasting 110-146 (mostly 136-146)    Hypoglycemia episodes: none, but when glucose 110, she feels hypoglycemia      Statin: yes   ACEI/ARB: yes  Aspirin: no     Exercise: increasing exercise recently as she is finding days she is sedentary, sugars are higher     Denies any polyuria, polydipsia, polyphagia, blurred or cloudy vision, rash or thrush, or numbness or tingling of feet. Last dilated retinal eye exam was August 2017      Ref. Range 12/23/2016 10:40 3/29/2017 08:47 6/29/2017 08:42 9/20/2017 08:01   Glycohemoglobin Unknown 7.1 (H) 7.3 6.7 7.1   Estim. Avg Glu Latest Units: mg/dL 157            Vitamin D deficiency  She continues to take vitamin D 5,000 units daily. She is due for labs for monitoring as last level was very low at 10.        Diagnoses of Type 2 diabetes mellitus without complication, without long-term current use of insulin (CMS-[facility]), Essential hypertension, Vitamin D deficiency, and Blood in stool were pertinent to this visit.    Allergies: Review of  "patient's allergies indicates no known allergies.  Current medicines (including changes today)  Current Outpatient Prescriptions   Medication Sig Dispense Refill   • lisinopril-hydrochlorothiazide (PRINZIDE, ZESTORETIC) 20-25 MG per tablet TAKE 1 TAB BY MOUTH EVERY DAY. 90 Tab 3   • atorvastatin (LIPITOR) 10 MG Tab      • Cholecalciferol (VITAMIN D3) 5000 UNITS Cap Take 1 Cap by mouth every day. 90 Cap 3   • metformin (GLUCOPHAGE) 500 MG Tab Take 1 Tab by mouth every day. 90 Tab 3   • Blood Glucose Monitoring Suppl SUPPLIES Misc Test strips order: Test strips for One Touch Ultra 2 meter. Sig: use twice daily and prn ssx high or low sugar #100 RF x 3 100 Each 3   • Lancets Misc 1 Each by Does not apply route 3 times a day. 100 Each 2   • levonorgestrel (MIRENA) 20 MCG/24HR IUD 1 Each by Intrauterine route Once.       No current facility-administered medications for this visit.        She  has a past medical history of Anxiety; Diabetes mellitus, type 2 (CMS-[facility]); Dyslipidemia; and HTN (hypertension).    Health Maintenance: vaccines due, she wants to wait until December ROS  As stated in HPI and additionally  General: No F/C, fatigue  Neuro: No headache or dizziness  CV: see HPI  Endo: see HPI      Objective:     Blood pressure 130/76, pulse 66, temperature 36.7 °C (98 °F), resp. rate 12, height 1.6 m (5' 3\"), weight 85.3 kg (188 lb), SpO2 98 %. Body mass index is 33.3 kg/m².  Physical Exam:  General: Alert, oriented, in no acute distress.  Eye contact is good, speech goal directed, affect calm  CNs grossly intact.  HEENT: conjunctiva non-injected, sclera non-icteric, EOMs intact.   Gross hearing intact.  Oral mucous membranes pink and moist with no lesions. Oropharynx without erythema, or exudate.   CV: regular rate and rhythm. No murmurs. No carotid bruits.   Abdomen: Soft, ND, non-tender. Rectum: No external hemorrhoid tags or fissures. Sphincter tone normal. No palpable masses. Occult blood negative  Ext: no " edema, normal color and temperature.   Skin: No rashes or lesions in visible areas  Gait steady.     Assessment and Plan:   Assessment/Plan:  1. Type 2 diabetes mellitus without complication, without long-term current use of insulin (CMS-[facility])  Stable continue metformin 250 mg BID. Enc increased physical activity, decreased alcohol/simple sugars. F/u 3 months.   - POCT Hemoglobin A1C  - COMP METABOLIC PANEL; Future  - MICROALBUMIN CREAT RATIO URINE; Future  - HEMOGLOBIN A1C; Future  - LIPID PROFILE; Future    2. Essential hypertension  Stable on current meds, but lisinopril causes cough. Will discuss changing rx at next visit. She does not want changes today   - LIPID PROFILE; Future    3. Vitamin D deficiency  Check labs   - VITAMIN D,25 HYDROXY; Future    4. Blood in stool  New problem. Check labs. If normal, start hydrocortisone rectal x5 days. Enc decrease fiber by 5 g daily then increase slowly by 2 g every 2 weeks   - CBC WITH DIFFERENTIAL; Future  - OCCULT BLOOD FECES IMMUNOASSAY; Future  - Calprotectin, Fecal; Future       Follow up:  Return in about 3 months (around 12/20/2017).    Educated in proper administration of medication(s) ordered today including safety, possible SE, risks, benefits, rationale and alternatives to therapy.   Supportive care, differential diagnoses, and indications for immediate follow-up discussed with patient.    Pathogenesis of diagnosis discussed including typical length and natural progression.    Instructed to return to clinic or nearest emergency department for any change in condition, further concerns, or worsening of symptoms.  Patient states understanding of the plan of care and discharge instructions.      Please note that this dictation was created using voice recognition software. I have made every reasonable attempt to correct obvious errors, but I expect that there are errors of grammar and possibly content that I did not discover before finalizing the  note.    Followup: Return in about 3 months (around 12/20/2017). sooner should new symptoms or problems arise.

## 2017-09-20 NOTE — ASSESSMENT & PLAN NOTE
Controlled: yes    Medications: metformin 250 mg BID     Glucose at home: fasting 110-146 (mostly 136-146)    Hypoglycemia episodes: none, but when glucose 110, she feels hypoglycemia      Statin: yes   ACEI/ARB: yes  Aspirin: no     Exercise: increasing exercise recently as she is finding days she is sedentary, sugars are higher     Denies any polyuria, polydipsia, polyphagia, blurred or cloudy vision, rash or thrush, or numbness or tingling of feet. Last dilated retinal eye exam was August 2017      Ref. Range 12/23/2016 10:40 3/29/2017 08:47 6/29/2017 08:42 9/20/2017 08:01   Glycohemoglobin Unknown 7.1 (H) 7.3 6.7 7.1   Estim. Avg Glu Latest Units: mg/dL 157

## 2017-09-20 NOTE — ASSESSMENT & PLAN NOTE
Chronic problem well controlled on current medications. Does not monitor blood pressure at home. Denies any severe headache, dizziness, vision changes, chest pain, BRANDT, palpitations, or lower extremity edema

## 2017-09-26 ENCOUNTER — HOSPITAL ENCOUNTER (OUTPATIENT)
Facility: MEDICAL CENTER | Age: 47
End: 2017-09-26
Attending: NURSE PRACTITIONER
Payer: COMMERCIAL

## 2017-09-26 DIAGNOSIS — K92.1 BLOOD IN STOOL: ICD-10-CM

## 2017-09-26 PROCEDURE — 83993 ASSAY FOR CALPROTECTIN FECAL: CPT

## 2017-09-26 PROCEDURE — 82274 ASSAY TEST FOR BLOOD FECAL: CPT

## 2017-09-28 DIAGNOSIS — K92.1 BLOOD IN STOOL: ICD-10-CM

## 2017-09-29 LAB
CALPROTECTIN STL-MCNT: 17 UG/G
HEMOCCULT STL QL IA: POSITIVE

## 2017-09-29 RX ORDER — HYDROCORTISONE ACETATE 25 MG/1
25 SUPPOSITORY RECTAL EVERY 12 HOURS
Qty: 10 SUPPOSITORY | Refills: 0 | Status: SHIPPED | OUTPATIENT
Start: 2017-09-29 | End: 2018-01-30

## 2017-10-05 ENCOUNTER — PATIENT MESSAGE (OUTPATIENT)
Dept: MEDICAL GROUP | Facility: PHYSICIAN GROUP | Age: 47
End: 2017-10-05

## 2017-10-05 DIAGNOSIS — K92.1 BLOOD IN STOOL: ICD-10-CM

## 2017-10-06 ENCOUNTER — HOSPITAL ENCOUNTER (OUTPATIENT)
Facility: MEDICAL CENTER | Age: 47
End: 2017-10-06
Attending: NURSE PRACTITIONER
Payer: COMMERCIAL

## 2017-10-06 PROCEDURE — 82274 ASSAY TEST FOR BLOOD FECAL: CPT

## 2017-10-07 DIAGNOSIS — E11.9 TYPE 2 DIABETES MELLITUS WITHOUT COMPLICATION, WITHOUT LONG-TERM CURRENT USE OF INSULIN (HCC): ICD-10-CM

## 2017-10-10 DIAGNOSIS — K92.1 BLOOD IN STOOL: ICD-10-CM

## 2017-10-11 ENCOUNTER — PATIENT MESSAGE (OUTPATIENT)
Dept: MEDICAL GROUP | Facility: PHYSICIAN GROUP | Age: 47
End: 2017-10-11

## 2017-10-11 DIAGNOSIS — K92.1 BLOOD IN STOOL: ICD-10-CM

## 2017-10-11 LAB — HEMOCCULT STL QL IA: POSITIVE

## 2018-01-09 ENCOUNTER — PATIENT MESSAGE (OUTPATIENT)
Dept: MEDICAL GROUP | Facility: PHYSICIAN GROUP | Age: 48
End: 2018-01-09

## 2018-01-18 ENCOUNTER — PATIENT MESSAGE (OUTPATIENT)
Dept: MEDICAL GROUP | Facility: PHYSICIAN GROUP | Age: 48
End: 2018-01-18

## 2018-01-18 DIAGNOSIS — M54.9 MID BACK PAIN: ICD-10-CM

## 2018-01-18 DIAGNOSIS — M54.9 UPPER BACK PAIN: ICD-10-CM

## 2018-01-26 ENCOUNTER — HOSPITAL ENCOUNTER (OUTPATIENT)
Dept: LAB | Facility: MEDICAL CENTER | Age: 48
End: 2018-01-26
Attending: NURSE PRACTITIONER
Payer: COMMERCIAL

## 2018-01-26 DIAGNOSIS — I10 ESSENTIAL HYPERTENSION: ICD-10-CM

## 2018-01-26 DIAGNOSIS — E11.9 TYPE 2 DIABETES MELLITUS WITHOUT COMPLICATION, WITHOUT LONG-TERM CURRENT USE OF INSULIN (HCC): ICD-10-CM

## 2018-01-26 DIAGNOSIS — E78.5 DYSLIPIDEMIA: ICD-10-CM

## 2018-01-26 DIAGNOSIS — E55.9 VITAMIN D DEFICIENCY: ICD-10-CM

## 2018-01-26 DIAGNOSIS — K92.1 BLOOD IN STOOL: ICD-10-CM

## 2018-01-26 LAB
25(OH)D3 SERPL-MCNC: 44 NG/ML (ref 30–100)
ALBUMIN SERPL BCP-MCNC: 4.3 G/DL (ref 3.2–4.9)
ALBUMIN/GLOB SERPL: 1.4 G/DL
ALP SERPL-CCNC: 64 U/L (ref 30–99)
ALT SERPL-CCNC: 41 U/L (ref 2–50)
ANION GAP SERPL CALC-SCNC: 8 MMOL/L (ref 0–11.9)
AST SERPL-CCNC: 38 U/L (ref 12–45)
BASOPHILS # BLD AUTO: 0.9 % (ref 0–1.8)
BASOPHILS # BLD: 0.08 K/UL (ref 0–0.12)
BILIRUB SERPL-MCNC: 1 MG/DL (ref 0.1–1.5)
BUN SERPL-MCNC: 12 MG/DL (ref 8–22)
CALCIUM SERPL-MCNC: 10.4 MG/DL (ref 8.5–10.5)
CHLORIDE SERPL-SCNC: 103 MMOL/L (ref 96–112)
CHOLEST SERPL-MCNC: 135 MG/DL (ref 100–199)
CO2 SERPL-SCNC: 26 MMOL/L (ref 20–33)
CREAT SERPL-MCNC: 0.61 MG/DL (ref 0.5–1.4)
CREAT UR-MCNC: 127.4 MG/DL
EOSINOPHIL # BLD AUTO: 0.14 K/UL (ref 0–0.51)
EOSINOPHIL NFR BLD: 1.6 % (ref 0–6.9)
ERYTHROCYTE [DISTWIDTH] IN BLOOD BY AUTOMATED COUNT: 42.5 FL (ref 35.9–50)
EST. AVERAGE GLUCOSE BLD GHB EST-MCNC: 180 MG/DL
GLOBULIN SER CALC-MCNC: 3.1 G/DL (ref 1.9–3.5)
GLUCOSE SERPL-MCNC: 133 MG/DL (ref 65–99)
HBA1C MFR BLD: 7.9 % (ref 0–5.6)
HCT VFR BLD AUTO: 42.2 % (ref 37–47)
HDLC SERPL-MCNC: 42 MG/DL
HGB BLD-MCNC: 13.7 G/DL (ref 12–16)
IMM GRANULOCYTES # BLD AUTO: 0.03 K/UL (ref 0–0.11)
IMM GRANULOCYTES NFR BLD AUTO: 0.4 % (ref 0–0.9)
LDLC SERPL CALC-MCNC: 80 MG/DL
LYMPHOCYTES # BLD AUTO: 2.58 K/UL (ref 1–4.8)
LYMPHOCYTES NFR BLD: 30.2 % (ref 22–41)
MCH RBC QN AUTO: 30.2 PG (ref 27–33)
MCHC RBC AUTO-ENTMCNC: 32.5 G/DL (ref 33.6–35)
MCV RBC AUTO: 93.2 FL (ref 81.4–97.8)
MICROALBUMIN UR-MCNC: 3.5 MG/DL
MICROALBUMIN/CREAT UR: 27 MG/G (ref 0–30)
MONOCYTES # BLD AUTO: 0.66 K/UL (ref 0–0.85)
MONOCYTES NFR BLD AUTO: 7.7 % (ref 0–13.4)
NEUTROPHILS # BLD AUTO: 5.04 K/UL (ref 2–7.15)
NEUTROPHILS NFR BLD: 59.2 % (ref 44–72)
NRBC # BLD AUTO: 0 K/UL
NRBC BLD-RTO: 0 /100 WBC
PLATELET # BLD AUTO: 260 K/UL (ref 164–446)
PMV BLD AUTO: 9 FL (ref 9–12.9)
POTASSIUM SERPL-SCNC: 4.2 MMOL/L (ref 3.6–5.5)
PROT SERPL-MCNC: 7.4 G/DL (ref 6–8.2)
RBC # BLD AUTO: 4.53 M/UL (ref 4.2–5.4)
SODIUM SERPL-SCNC: 137 MMOL/L (ref 135–145)
TRIGL SERPL-MCNC: 63 MG/DL (ref 0–149)
WBC # BLD AUTO: 8.5 K/UL (ref 4.8–10.8)

## 2018-01-26 PROCEDURE — 82570 ASSAY OF URINE CREATININE: CPT

## 2018-01-26 PROCEDURE — 80053 COMPREHEN METABOLIC PANEL: CPT

## 2018-01-26 PROCEDURE — 36415 COLL VENOUS BLD VENIPUNCTURE: CPT

## 2018-01-26 PROCEDURE — 82043 UR ALBUMIN QUANTITATIVE: CPT

## 2018-01-26 PROCEDURE — 82306 VITAMIN D 25 HYDROXY: CPT

## 2018-01-26 PROCEDURE — 85025 COMPLETE CBC W/AUTO DIFF WBC: CPT

## 2018-01-26 PROCEDURE — 80061 LIPID PANEL: CPT

## 2018-01-26 PROCEDURE — 83036 HEMOGLOBIN GLYCOSYLATED A1C: CPT

## 2018-01-26 NOTE — TELEPHONE ENCOUNTER
Was the patient seen in the last year in this department? Yes LOV 09/20/17 UP COMING JC. 01/30/18 LABS DRAWN 01/26/17    Does patient have an active prescription for medications requested? No     Received Request Via: Pharmacy

## 2018-01-28 RX ORDER — ATORVASTATIN CALCIUM 10 MG/1
TABLET, FILM COATED ORAL
Qty: 90 TAB | Refills: 3 | Status: SHIPPED | OUTPATIENT
Start: 2018-01-28 | End: 2018-12-12 | Stop reason: SDUPTHER

## 2018-01-30 ENCOUNTER — OFFICE VISIT (OUTPATIENT)
Dept: MEDICAL GROUP | Facility: PHYSICIAN GROUP | Age: 48
End: 2018-01-30
Payer: COMMERCIAL

## 2018-01-30 ENCOUNTER — PATIENT MESSAGE (OUTPATIENT)
Dept: MEDICAL GROUP | Facility: PHYSICIAN GROUP | Age: 48
End: 2018-01-30

## 2018-01-30 VITALS
BODY MASS INDEX: 33.49 KG/M2 | WEIGHT: 189 LBS | SYSTOLIC BLOOD PRESSURE: 120 MMHG | HEIGHT: 63 IN | OXYGEN SATURATION: 99 % | TEMPERATURE: 98.4 F | DIASTOLIC BLOOD PRESSURE: 60 MMHG | HEART RATE: 72 BPM

## 2018-01-30 DIAGNOSIS — E11.9 TYPE 2 DIABETES MELLITUS WITHOUT COMPLICATION, WITHOUT LONG-TERM CURRENT USE OF INSULIN (HCC): ICD-10-CM

## 2018-01-30 DIAGNOSIS — E55.9 VITAMIN D DEFICIENCY: ICD-10-CM

## 2018-01-30 DIAGNOSIS — R80.9 MICROALBUMINURIA: ICD-10-CM

## 2018-01-30 DIAGNOSIS — E78.5 DYSLIPIDEMIA: ICD-10-CM

## 2018-01-30 PROCEDURE — 99214 OFFICE O/P EST MOD 30 MIN: CPT | Performed by: NURSE PRACTITIONER

## 2018-01-30 ASSESSMENT — PAIN SCALES - GENERAL: PAINLEVEL: NO PAIN

## 2018-01-30 NOTE — PROGRESS NOTES
Subjective:     Chief Complaint   Patient presents with   • Diabetes     review bloodwork       HPI  Dora Sheldon is a 47 y.o. female here today for routine f/u     Diabetes mellitus, type 2  Controlled: stable  Medications: metformin 500 mg in morning   Glucose at home: fasting 150-160s  Hypoglycemia episodes: none  Statin: yes  ACEI/ARB: yes  Aspirin: no   Exercise: intermittently doing stationary bicycle   Denies any polyuria, polydipsia, polyphagia, blurred or cloudy vision, rash or thrush, or numbness or tingling of feet. Last dilated retinal eye exam was August 2017   Ref. Range 12/23/2016 10:40 1/26/2018 10:47   Glucose Latest Ref Range: 65 - 99 mg/dL 136 (H) 133 (H)      Ref. Range 3/29/2017 08:47 6/29/2017 08:42 9/20/2017 08:01 1/26/2018 10:47   Glycohemoglobin Latest Ref Range: 0.0 - 5.6 % 7.3 6.7 7.1 7.9 (H)       Dyslipidemia  Ongoing problem well controlled with atorvastatin without myalgias    Ref. Range 12/23/2016 10:40 1/26/2018 10:47   Cholesterol,Tot Latest Ref Range: 100 - 199 mg/dL 165 135   Triglycerides Latest Ref Range: 0 - 149 mg/dL 90 63   HDL Latest Ref Range: >=40 mg/dL 38 (A) 42   LDL Latest Ref Range: <100 mg/dL 109 (H) 80       BMI 33.0-33.9,adult  She is remaining about the same weight. She intermittently uses stationary bicycle. Has really put in effort     Microalbuminuria  Ongoing problem doing well with lisinopril   Ref. Range 9/20/2016 07:19 1/26/2018 10:47   Micro Alb Creat Ratio Latest Ref Range: 0 - 30 mg/g 25 27   Creatinine, Urine Latest Units: mg/dL 212.70 127.40   Microalbumin, Urine Random Latest Units: mg/dL 5.3 3.5       Vitamin D deficiency  Doing well on vitamin D 5,000 units daily    Ref. Range 3/18/2016 07:32 12/23/2016 10:40 1/26/2018 10:47   25-Hydroxy   Vitamin D 25 Latest Ref Range: 30 - 100 ng/mL 16 (L) 10 (L) 44        Diagnoses of Type 2 diabetes mellitus without complication, without long-term current use of insulin (CMS-HCC), Dyslipidemia, Microalbuminuria,  "Vitamin D deficiency, and BMI 33.0-33.9,adult were pertinent to this visit.    Allergies: Patient has no known allergies.  Current medicines (including changes today)  Current Outpatient Prescriptions   Medication Sig Dispense Refill   • atorvastatin (LIPITOR) 10 MG Tab TAKE 1 TAB BY MOUTH EVERY DAY. 90 Tab 3   • metformin (GLUCOPHAGE) 500 MG Tab TAKE 1 TABLET BY MOUTH TWICE A DAY WITH MEALS 180 Tab 3   • lisinopril-hydrochlorothiazide (PRINZIDE, ZESTORETIC) 20-25 MG per tablet TAKE 1 TAB BY MOUTH EVERY DAY. 90 Tab 3   • Cholecalciferol (VITAMIN D3) 5000 UNITS Cap Take 1 Cap by mouth every day. 90 Cap 3   • Blood Glucose Monitoring Suppl SUPPLIES Misc Test strips order: Test strips for One Touch Ultra 2 meter. Sig: use twice daily and prn ssx high or low sugar #100 RF x 3 100 Each 3   • Lancets Misc 1 Each by Does not apply route 3 times a day. 100 Each 2   • levonorgestrel (MIRENA) 20 MCG/24HR IUD 1 Each by Intrauterine route Once.       No current facility-administered medications for this visit.        She  has a past medical history of Anxiety; Diabetes mellitus, type 2 (CMS-[facility]); Dyslipidemia; and HTN (hypertension).        ROS  As stated in HPI      Objective:     Blood pressure 120/60, pulse 72, temperature 36.9 °C (98.4 °F), height 1.6 m (5' 3\"), weight 85.7 kg (189 lb), SpO2 99 %. Body mass index is 33.48 kg/m².  Physical Exam:  General: Alert, oriented, in no acute distress.  Eye contact is good, speech goal directed, affect calm  CNs grossly intact.  HEENT: conjunctiva non-injected, sclera non-icteric, EOMs intact.No lid edema or eye drainage.   Gross hearing intact.  Oral mucous membranes pink and moist with no lesions. Oropharynx without erythema, or exudate.   Lungs: unlabored. clear to auscultation bilaterally with good excursion.  CV: regular rate and rhythm. No murmurs. No carotid bruits.   Ext: no edema, normal color and temperature.   Skin: No rashes or lesions in visible areas  Gait steady. "     Assessment and Plan:   Assessment/Plan:  1. Type 2 diabetes mellitus without complication, without long-term current use of insulin (CMS-[facility])  Not at goal of A1C <7. Increase metformin to 1 tab twice daily. Long discussion about lifestyle management with diet, exercise, stress management, and effects of stress and lifestyle on her chronic condition. F/u 3-4 months.    2. Dyslipidemia  Stable on statin. Lipids due in one year    3. Microalbuminuria  Stable on lisinopril, next monitoring due one year    4. Vitamin D deficiency  Stable on 5,000 units daily     5. BMI 33.0-33.9,adult  - Patient identified as having weight management issue.  Appropriate orders and counseling given.    The total time spent seeing the patient in consultation, and formulating an action plan for this visit was 25 minutes.  Greater than 50% of this time was spent face to face counseling, discussing problems documented above, coordinating care and answering questions by the provider.          Follow up:  Return in about 4 months (around 5/30/2018).    Educated in proper administration of medication(s) ordered today including safety, possible SE, risks, benefits, rationale and alternatives to therapy.   Supportive care, differential diagnoses, and indications for immediate follow-up discussed with patient.    Pathogenesis of diagnosis discussed including typical length and natural progression.    Instructed to return to clinic or nearest emergency department for any change in condition, further concerns, or worsening of symptoms.  Patient states understanding of the plan of care and discharge instructions.      Please note that this dictation was created using voice recognition software. I have made every reasonable attempt to correct obvious errors, but I expect that there are errors of grammar and possibly content that I did not discover before finalizing the note.    Followup: Return in about 4 months (around 5/30/2018). sooner should  new symptoms or problems arise.

## 2018-01-30 NOTE — ASSESSMENT & PLAN NOTE
Ongoing problem well controlled with atorvastatin without myalgias    Ref. Range 12/23/2016 10:40 1/26/2018 10:47   Cholesterol,Tot Latest Ref Range: 100 - 199 mg/dL 165 135   Triglycerides Latest Ref Range: 0 - 149 mg/dL 90 63   HDL Latest Ref Range: >=40 mg/dL 38 (A) 42   LDL Latest Ref Range: <100 mg/dL 109 (H) 80

## 2018-01-30 NOTE — ASSESSMENT & PLAN NOTE
Doing well on vitamin D 5,000 units daily    Ref. Range 3/18/2016 07:32 12/23/2016 10:40 1/26/2018 10:47   25-Hydroxy   Vitamin D 25 Latest Ref Range: 30 - 100 ng/mL 16 (L) 10 (L) 44

## 2018-01-30 NOTE — ASSESSMENT & PLAN NOTE
Controlled: stable  Medications: metformin 500 mg in morning   Glucose at home: fasting 150-160s  Hypoglycemia episodes: none  Statin: yes  ACEI/ARB: yes  Aspirin: no   Exercise: intermittently doing stationary bicycle   Denies any polyuria, polydipsia, polyphagia, blurred or cloudy vision, rash or thrush, or numbness or tingling of feet. Last dilated retinal eye exam was August 2017   Ref. Range 12/23/2016 10:40 1/26/2018 10:47   Glucose Latest Ref Range: 65 - 99 mg/dL 136 (H) 133 (H)      Ref. Range 3/29/2017 08:47 6/29/2017 08:42 9/20/2017 08:01 1/26/2018 10:47   Glycohemoglobin Latest Ref Range: 0.0 - 5.6 % 7.3 6.7 7.1 7.9 (H)

## 2018-01-30 NOTE — ASSESSMENT & PLAN NOTE
She is remaining about the same weight. She intermittently uses stationary bicycle. Has really put in effort

## 2018-01-30 NOTE — ASSESSMENT & PLAN NOTE
Ongoing problem doing well with lisinopril   Ref. Range 9/20/2016 07:19 1/26/2018 10:47   Micro Alb Creat Ratio Latest Ref Range: 0 - 30 mg/g 25 27   Creatinine, Urine Latest Units: mg/dL 212.70 127.40   Microalbumin, Urine Random Latest Units: mg/dL 5.3 3.5

## 2018-03-13 DIAGNOSIS — G47.33 OSA ON CPAP: ICD-10-CM

## 2018-04-05 DIAGNOSIS — E11.9 TYPE 2 DIABETES MELLITUS WITHOUT COMPLICATION, WITHOUT LONG-TERM CURRENT USE OF INSULIN (HCC): ICD-10-CM

## 2018-04-05 NOTE — TELEPHONE ENCOUNTER
Was the patient seen in the last year in this department? Yes  LOV 1/30/18    Does patient have an active prescription for medications requested? Yes     Received Request Via: Pharmacy

## 2018-05-29 RX ORDER — LISINOPRIL AND HYDROCHLOROTHIAZIDE 25; 20 MG/1; MG/1
TABLET ORAL
Qty: 30 TAB | Refills: 0 | Status: SHIPPED | OUTPATIENT
Start: 2018-05-29 | End: 2018-06-29 | Stop reason: SDUPTHER

## 2018-06-11 ENCOUNTER — PATIENT MESSAGE (OUTPATIENT)
Dept: MEDICAL GROUP | Facility: PHYSICIAN GROUP | Age: 48
End: 2018-06-11

## 2018-06-22 ENCOUNTER — PATIENT MESSAGE (OUTPATIENT)
Dept: MEDICAL GROUP | Facility: PHYSICIAN GROUP | Age: 48
End: 2018-06-22

## 2018-06-22 ENCOUNTER — OFFICE VISIT (OUTPATIENT)
Dept: MEDICAL GROUP | Facility: PHYSICIAN GROUP | Age: 48
End: 2018-06-22
Payer: COMMERCIAL

## 2018-06-22 VITALS
WEIGHT: 190 LBS | SYSTOLIC BLOOD PRESSURE: 128 MMHG | DIASTOLIC BLOOD PRESSURE: 76 MMHG | OXYGEN SATURATION: 99 % | BODY MASS INDEX: 33.66 KG/M2 | TEMPERATURE: 98.4 F | HEIGHT: 63 IN | HEART RATE: 84 BPM

## 2018-06-22 DIAGNOSIS — R19.5 LOOSE STOOLS: ICD-10-CM

## 2018-06-22 DIAGNOSIS — Z12.31 VISIT FOR SCREENING MAMMOGRAM: ICD-10-CM

## 2018-06-22 DIAGNOSIS — Z00.00 ROUTINE HEALTH MAINTENANCE: ICD-10-CM

## 2018-06-22 DIAGNOSIS — K76.0 FATTY LIVER: ICD-10-CM

## 2018-06-22 DIAGNOSIS — E11.9 TYPE 2 DIABETES MELLITUS WITHOUT COMPLICATION, WITHOUT LONG-TERM CURRENT USE OF INSULIN (HCC): ICD-10-CM

## 2018-06-22 DIAGNOSIS — I10 ESSENTIAL HYPERTENSION: ICD-10-CM

## 2018-06-22 LAB
HBA1C MFR BLD: 7.2 % (ref ?–5.8)
INT CON NEG: NEGATIVE
INT CON POS: POSITIVE

## 2018-06-22 PROCEDURE — 99214 OFFICE O/P EST MOD 30 MIN: CPT | Performed by: NURSE PRACTITIONER

## 2018-06-22 PROCEDURE — 83036 HEMOGLOBIN GLYCOSYLATED A1C: CPT | Performed by: NURSE PRACTITIONER

## 2018-06-22 ASSESSMENT — PATIENT HEALTH QUESTIONNAIRE - PHQ9: CLINICAL INTERPRETATION OF PHQ2 SCORE: 0

## 2018-06-22 NOTE — PROGRESS NOTES
Subjective:     Chief Complaint   Patient presents with   • Diabetes     A1c done in office   • Follow-Up     lab review       HPI  Dora Sheldon is a 47 y.o. female here today for routine f/u on diabetes. She has a lot of questions today about diet modifications she has been making     Diabetes mellitus, type 2  Ongoing chronic problem that remains stable. A1C is improved today at 7.2, down from 7.9 6 months ago. Managed with metformin 500 mg daily, and 1/2 tab 250 mg at bedtime. She is only having 1/2 tab because she is hoping to be on at least medication as possible.   She has been eating more poorly over the past month with many events going on. continues to exercise intermittently.  Denies any polyuria, polydipsia, or polyphagia. Retinal exam UTD.     Fatty liver  She just had abd US last month revealing stable fatty liver. This is followed by GI Dr. Christiansen. She understands lifestyle changes that need to be made for chronic management of this. She is on statin therapy.     BMI 33.0-33.9,adult  She continues to appear motivated to make diet changes and be physically active, but cannot stick to her diet. Her  has said she has made progress with 2% body fat loss.     Essential hypertension  Chronic problem that is stable on current lisinopril-hctz medication. Does not monitor bp at home.   Vitals 6/29/2017 9/20/2017 1/30/2018 6/22/2018   SYSTOLIC 118 130 120 128   DIASTOLIC 70 76 60 76   PULSE 66 66 72 84        Diagnoses of Type 2 diabetes mellitus without complication, without long-term current use of insulin (HCC), Essential hypertension, Fatty liver, BMI 33.0-33.9,adult, Routine health maintenance, and Visit for screening mammogram were pertinent to this visit.    Allergies: Patient has no known allergies.  Current medicines (including changes today)  Current Outpatient Prescriptions   Medication Sig Dispense Refill   • lisinopril-hydrochlorothiazide (PRINZIDE, ZESTORETIC) 20-25 MG per tablet TAKE 1 TABLET  "BY MOUTH EVERY DAY 30 Tab 0   • metFORMIN (GLUCOPHAGE) 500 MG Tab Take 1 Tab by mouth 2 times a day, with meals. 180 Tab 3   • atorvastatin (LIPITOR) 10 MG Tab TAKE 1 TAB BY MOUTH EVERY DAY. 90 Tab 3   • Blood Glucose Monitoring Suppl SUPPLIES Misc Test strips order: Test strips for One Touch Ultra 2 meter. Sig: use twice daily and prn ssx high or low sugar #100 RF x 3 100 Each 3   • Lancets Misc 1 Each by Does not apply route 3 times a day. 100 Each 2   • levonorgestrel (MIRENA) 20 MCG/24HR IUD 1 Each by Intrauterine route Once.       No current facility-administered medications for this visit.        She  has a past medical history of Anxiety; Diabetes mellitus, type 2 (HCC); Dyslipidemia; and HTN (hypertension).        ROS  As stated in HPI and additionally  Gen: No weight loss or gain  CV: No chest pain or LE edema  Pulm: No sob or dyspnea  Endo: see HPI     Objective:     Blood pressure 128/76, pulse 84, temperature 36.9 °C (98.4 °F), height 1.6 m (5' 3\"), weight 86.2 kg (190 lb), SpO2 99 %. Body mass index is 33.66 kg/m².  Physical Exam:  General: Alert, oriented, in no acute distress.  Eye contact is good, speech goal directed, affect calm  CNs grossly intact.  HEENT: conjunctiva non-injected, sclera non-icteric, EOMs intact. No lid edema or eye drainage.   Gross hearing intact.  Oral mucous membranes pink and moist with no lesions. Oropharynx without erythema, or exudate.   Lungs: unlabored. clear to auscultation bilaterally with good excursion.  CV: regular rate and rhythm. No murmurs. No carotid bruits.   Ext: no edema, normal color and temperature.   Skin: No rashes or lesions in visible areas  Gait steady.     Assessment and Plan:   Assessment/Plan:  1. Type 2 diabetes mellitus without complication, without long-term current use of insulin (HCC)  Improving. Stable. Continue current medications and lifestyle modifications. f/u 6 months   - POCT Hemoglobin A1C  - COMP METABOLIC PANEL; Future  - LIPID " PROFILE; Future  - HEMOGLOBIN A1C; Future  - MICROALBUMIN CREAT RATIO URINE; Future    2. Essential hypertension  Stable on current meds goal bp <140/90  - COMP METABOLIC PANEL; Future  - TSH WITH REFLEX TO FT4; Future    3. Fatty liver  Lengthy discussion today about diet changes and exercise    4. BMI 33.0-33.9,adult  Lengthy discussion today about diet changes and exercise    5. Routine health maintenance  - CBC WITH DIFFERENTIAL; Future    6. Visit for screening mammogram  - MA-SCREEN MAMMO W/CAD-BILAT; Future       Follow up:  Return in about 6 months (around 12/22/2018).    Educated in proper administration of medication(s) ordered today including safety, possible SE, risks, benefits, rationale and alternatives to therapy.   Supportive care, differential diagnoses, and indications for immediate follow-up discussed with patient.    Pathogenesis of diagnosis discussed including typical length and natural progression.    Instructed to return to clinic or nearest emergency department for any change in condition, further concerns, or worsening of symptoms.  Patient states understanding of the plan of care and discharge instructions.      Please note that this dictation was created using voice recognition software. I have made every reasonable attempt to correct obvious errors, but I expect that there are errors of grammar and possibly content that I did not discover before finalizing the note.    Followup: Return in about 6 months (around 12/22/2018). sooner should new symptoms or problems arise.

## 2018-06-22 NOTE — ASSESSMENT & PLAN NOTE
She just had abd US last month revealing stable fatty liver. This is followed by GI Dr. Christiansen. She understands lifestyle changes that need to be made for chronic management of this. She is on statin therapy.

## 2018-06-22 NOTE — ASSESSMENT & PLAN NOTE
Chronic problem that is stable on current lisinopril-hctz medication. Does not monitor bp at home.   Vitals 6/29/2017 9/20/2017 1/30/2018 6/22/2018   SYSTOLIC 118 130 120 128   DIASTOLIC 70 76 60 76   PULSE 66 66 72 84

## 2018-06-22 NOTE — ASSESSMENT & PLAN NOTE
She continues to appear motivated to make diet changes and be physically active, but cannot stick to her diet. Her  has said she has made progress with 2% body fat loss.

## 2018-06-22 NOTE — ASSESSMENT & PLAN NOTE
Ongoing chronic problem that remains stable. A1C is improved today at 7.2, down from 7.9 6 months ago. Managed with metformin 500 mg daily, and 1/2 tab 250 mg at bedtime. She is only having 1/2 tab because she is hoping to be on at least medication as possible.   She has been eating more poorly over the past month with many events going on. continues to exercise intermittently.  Denies any polyuria, polydipsia, or polyphagia. Retinal exam UTD.

## 2018-06-25 ENCOUNTER — HOSPITAL ENCOUNTER (OUTPATIENT)
Facility: MEDICAL CENTER | Age: 48
End: 2018-06-25
Attending: NURSE PRACTITIONER
Payer: COMMERCIAL

## 2018-06-25 ENCOUNTER — APPOINTMENT (RX ONLY)
Dept: URBAN - METROPOLITAN AREA CLINIC 20 | Facility: CLINIC | Age: 48
Setting detail: DERMATOLOGY
End: 2018-06-25

## 2018-06-25 DIAGNOSIS — L90.5 SCAR CONDITIONS AND FIBROSIS OF SKIN: ICD-10-CM

## 2018-06-25 DIAGNOSIS — R19.5 LOOSE STOOLS: ICD-10-CM

## 2018-06-25 DIAGNOSIS — D22 MELANOCYTIC NEVI: ICD-10-CM

## 2018-06-25 DIAGNOSIS — L81.1 CHLOASMA: ICD-10-CM

## 2018-06-25 DIAGNOSIS — L57.8 OTHER SKIN CHANGES DUE TO CHRONIC EXPOSURE TO NONIONIZING RADIATION: ICD-10-CM

## 2018-06-25 DIAGNOSIS — L82.1 OTHER SEBORRHEIC KERATOSIS: ICD-10-CM

## 2018-06-25 DIAGNOSIS — D18.0 HEMANGIOMA: ICD-10-CM

## 2018-06-25 DIAGNOSIS — L81.4 OTHER MELANIN HYPERPIGMENTATION: ICD-10-CM

## 2018-06-25 PROBLEM — E78.5 HYPERLIPIDEMIA, UNSPECIFIED: Status: ACTIVE | Noted: 2018-06-25

## 2018-06-25 PROBLEM — D22.5 MELANOCYTIC NEVI OF TRUNK: Status: ACTIVE | Noted: 2018-06-25

## 2018-06-25 PROBLEM — D18.01 HEMANGIOMA OF SKIN AND SUBCUTANEOUS TISSUE: Status: ACTIVE | Noted: 2018-06-25

## 2018-06-25 LAB
C DIFF DNA SPEC QL NAA+PROBE: NEGATIVE
C DIFF TOX GENS STL QL NAA+PROBE: NEGATIVE

## 2018-06-25 PROCEDURE — ? OBSERVATION

## 2018-06-25 PROCEDURE — ? COUNSELING

## 2018-06-25 PROCEDURE — ? ADDITIONAL NOTES

## 2018-06-25 PROCEDURE — 87493 C DIFF AMPLIFIED PROBE: CPT

## 2018-06-25 PROCEDURE — 99214 OFFICE O/P EST MOD 30 MIN: CPT

## 2018-06-25 ASSESSMENT — LOCATION DETAILED DESCRIPTION DERM
LOCATION DETAILED: LEFT PROXIMAL DORSAL FOREARM
LOCATION DETAILED: LEFT MEDIAL SUPERIOR CHEST
LOCATION DETAILED: RIGHT INFERIOR UPPER BACK
LOCATION DETAILED: RIGHT ANTERIOR DISTAL THIGH
LOCATION DETAILED: RIGHT CENTRAL MALAR CHEEK
LOCATION DETAILED: LEFT LATERAL UPPER BACK
LOCATION DETAILED: LEFT ANTERIOR PROXIMAL UPPER ARM
LOCATION DETAILED: LEFT INFERIOR CENTRAL MALAR CHEEK
LOCATION DETAILED: RIGHT SUPERIOR MEDIAL UPPER BACK
LOCATION DETAILED: RIGHT PROXIMAL DORSAL FOREARM
LOCATION DETAILED: LEFT ANTERIOR DISTAL THIGH
LOCATION DETAILED: RIGHT ANTERIOR PROXIMAL UPPER ARM
LOCATION DETAILED: RIGHT MID-UPPER BACK
LOCATION DETAILED: RIGHT MEDIAL FOREHEAD
LOCATION DETAILED: LEFT CENTRAL MALAR CHEEK

## 2018-06-25 ASSESSMENT — LOCATION SIMPLE DESCRIPTION DERM
LOCATION SIMPLE: RIGHT FOREARM
LOCATION SIMPLE: LEFT UPPER ARM
LOCATION SIMPLE: RIGHT UPPER BACK
LOCATION SIMPLE: LEFT CHEEK
LOCATION SIMPLE: LEFT THIGH
LOCATION SIMPLE: RIGHT THIGH
LOCATION SIMPLE: RIGHT UPPER ARM
LOCATION SIMPLE: CHEST
LOCATION SIMPLE: LEFT FOREARM
LOCATION SIMPLE: LEFT UPPER BACK
LOCATION SIMPLE: RIGHT FOREHEAD
LOCATION SIMPLE: RIGHT CHEEK

## 2018-06-25 ASSESSMENT — LOCATION ZONE DERM
LOCATION ZONE: FACE
LOCATION ZONE: TRUNK
LOCATION ZONE: ARM
LOCATION ZONE: LEG

## 2018-06-29 RX ORDER — LISINOPRIL AND HYDROCHLOROTHIAZIDE 25; 20 MG/1; MG/1
TABLET ORAL
Qty: 90 TAB | Refills: 3 | Status: SHIPPED | OUTPATIENT
Start: 2018-06-29 | End: 2018-12-12

## 2018-07-18 ENCOUNTER — HOSPITAL ENCOUNTER (OUTPATIENT)
Dept: RADIOLOGY | Facility: MEDICAL CENTER | Age: 48
End: 2018-07-18
Attending: NURSE PRACTITIONER
Payer: COMMERCIAL

## 2018-07-18 ENCOUNTER — OFFICE VISIT (OUTPATIENT)
Dept: URGENT CARE | Facility: PHYSICIAN GROUP | Age: 48
End: 2018-07-18
Payer: COMMERCIAL

## 2018-07-18 VITALS
HEART RATE: 74 BPM | SYSTOLIC BLOOD PRESSURE: 124 MMHG | OXYGEN SATURATION: 100 % | TEMPERATURE: 98.9 F | HEIGHT: 63 IN | BODY MASS INDEX: 33.66 KG/M2 | WEIGHT: 190 LBS | DIASTOLIC BLOOD PRESSURE: 82 MMHG

## 2018-07-18 DIAGNOSIS — R59.0 CERVICAL ADENOPATHY: ICD-10-CM

## 2018-07-18 PROCEDURE — 99213 OFFICE O/P EST LOW 20 MIN: CPT | Performed by: NURSE PRACTITIONER

## 2018-07-18 PROCEDURE — 76536 US EXAM OF HEAD AND NECK: CPT

## 2018-07-20 ENCOUNTER — OFFICE VISIT (OUTPATIENT)
Dept: MEDICAL GROUP | Facility: PHYSICIAN GROUP | Age: 48
End: 2018-07-20
Payer: COMMERCIAL

## 2018-07-20 ENCOUNTER — HOSPITAL ENCOUNTER (OUTPATIENT)
Dept: LAB | Facility: MEDICAL CENTER | Age: 48
End: 2018-07-20
Attending: NURSE PRACTITIONER
Payer: COMMERCIAL

## 2018-07-20 VITALS
BODY MASS INDEX: 32.43 KG/M2 | WEIGHT: 183 LBS | DIASTOLIC BLOOD PRESSURE: 78 MMHG | OXYGEN SATURATION: 96 % | RESPIRATION RATE: 16 BRPM | SYSTOLIC BLOOD PRESSURE: 120 MMHG | TEMPERATURE: 99.3 F | HEART RATE: 90 BPM | HEIGHT: 63 IN

## 2018-07-20 DIAGNOSIS — L04.0 ACUTE CERVICAL ADENITIS: ICD-10-CM

## 2018-07-20 DIAGNOSIS — E04.2 MULTIPLE THYROID NODULES: ICD-10-CM

## 2018-07-20 LAB
T3 SERPL-MCNC: 93.8 NG/DL (ref 60–181)
THYROPEROXIDASE AB SERPL-ACNC: 1.1 IU/ML (ref 0–9)
TSH SERPL DL<=0.005 MIU/L-ACNC: 0.87 UIU/ML (ref 0.38–5.33)

## 2018-07-20 PROCEDURE — 86800 THYROGLOBULIN ANTIBODY: CPT

## 2018-07-20 PROCEDURE — 84443 ASSAY THYROID STIM HORMONE: CPT

## 2018-07-20 PROCEDURE — 84480 ASSAY TRIIODOTHYRONINE (T3): CPT

## 2018-07-20 PROCEDURE — 36415 COLL VENOUS BLD VENIPUNCTURE: CPT

## 2018-07-20 PROCEDURE — 86376 MICROSOMAL ANTIBODY EACH: CPT

## 2018-07-20 PROCEDURE — 99213 OFFICE O/P EST LOW 20 MIN: CPT | Performed by: NURSE PRACTITIONER

## 2018-07-20 NOTE — ASSESSMENT & PLAN NOTE
This is a new problem incidentally found on US yesterday during evaluation of a lymph node. She has not had thyroid testing for 2.5 years, but TSH has been normal. No family hx of thyroid cancer.   7/18/2018 12:08 PM    HISTORY/REASON FOR EXAM:  Mass/Lump  Cervical adenopathy    TECHNIQUE/EXAM DESCRIPTION:  Ultrasound of the soft tissues of the head and neck.    COMPARISON:  None    FINDINGS:  The thyroid gland is heterogeneous.  Vascularity is normal.    The right lobe of the thyroid gland measures 6.7 cm x 2.2 cm x 1.7 cm.  The left lobe of the thyroid gland measures 5.9 cm x 1.3 cm x 2.1 cm.    A heterogeneous slightly hyperechoic nodule in the mid right thyroid lobe measures approximately 1.5 x 1.4 x 2.4 cm.    A heterogeneous isoechoic nodule in the left thyroid lobe is poorly defined and measures approximately 1.5 x 1.3 x 2.6 cm. A second poorly defined isoechoic nodule measures approximately 1.6 x 1 x 1.9 cm.    A normal appearing cervical lymph node correlates with palpable abnormality on the right.   Impression       1.  Thyroid enlargement with poorly defined thyroid nodules.    2.  No adenopathy is appreciated.

## 2018-07-22 LAB — THYROGLOB AB SERPL-ACNC: <0.9 IU/ML (ref 0–4)

## 2018-07-25 NOTE — PROGRESS NOTES
Subjective:     Chief Complaint   Patient presents with   • Results     US of head and neck, thyroid issue       HPI  Dora Sheldon is a 47 y.o. female here today for review of US. She has a tender lump on the side of right neck. It was more tender a few days ago, and has slightly improved. No recent illness or allergies. No treatments tried. US completed from urgent care for this with incidental finding as below    Multiple thyroid nodules  This is a new problem incidentally found on US yesterday during evaluation of a lymph node. She has not had thyroid testing for 2.5 years, but TSH has been normal. No family hx of thyroid cancer.   7/18/2018 12:08 PM    HISTORY/REASON FOR EXAM:  Mass/Lump  Cervical adenopathy    TECHNIQUE/EXAM DESCRIPTION:  Ultrasound of the soft tissues of the head and neck.    COMPARISON:  None    FINDINGS:  The thyroid gland is heterogeneous.  Vascularity is normal.    The right lobe of the thyroid gland measures 6.7 cm x 2.2 cm x 1.7 cm.  The left lobe of the thyroid gland measures 5.9 cm x 1.3 cm x 2.1 cm.    A heterogeneous slightly hyperechoic nodule in the mid right thyroid lobe measures approximately 1.5 x 1.4 x 2.4 cm.    A heterogeneous isoechoic nodule in the left thyroid lobe is poorly defined and measures approximately 1.5 x 1.3 x 2.6 cm. A second poorly defined isoechoic nodule measures approximately 1.6 x 1 x 1.9 cm.    A normal appearing cervical lymph node correlates with palpable abnormality on the right.   Impression       1.  Thyroid enlargement with poorly defined thyroid nodules.    2.  No adenopathy is appreciated.          Diagnoses of Multiple thyroid nodules and Acute cervical adenitis were pertinent to this visit.    Allergies: Patient has no known allergies.  Current medicines (including changes today)  Current Outpatient Prescriptions   Medication Sig Dispense Refill   • lisinopril-hydrochlorothiazide (PRINZIDE, ZESTORETIC) 20-25 MG per tablet TAKE 1 TABLET BY MOUTH  "EVERY DAY 90 Tab 3   • metFORMIN (GLUCOPHAGE) 500 MG Tab Take 1 Tab by mouth 2 times a day, with meals. 180 Tab 3   • atorvastatin (LIPITOR) 10 MG Tab TAKE 1 TAB BY MOUTH EVERY DAY. 90 Tab 3   • Blood Glucose Monitoring Suppl SUPPLIES Misc Test strips order: Test strips for One Touch Ultra 2 meter. Sig: use twice daily and prn ssx high or low sugar #100 RF x 3 100 Each 3   • Lancets Misc 1 Each by Does not apply route 3 times a day. 100 Each 2   • levonorgestrel (MIRENA) 20 MCG/24HR IUD 1 Each by Intrauterine route Once.       No current facility-administered medications for this visit.        She  has a past medical history of Anxiety; Diabetes mellitus, type 2 (HCC); Dyslipidemia; and HTN (hypertension).      ROS  As stated in HPI      Objective:     Blood pressure 120/78, pulse 90, temperature 37.4 °C (99.3 °F), resp. rate 16, height 1.6 m (5' 3\"), weight 83 kg (183 lb), SpO2 96 %. Body mass index is 32.42 kg/m².  Physical Exam:  General: Alert, oriented, in no acute distress.  Eye contact is good, speech goal directed, affect calm  CNs grossly intact.  HEENT: conjunctiva non-injected, sclera non-icteric, EOMs intact.   Gross hearing intact.  Nasal turbinates without edema or drainage.   Oral mucous membranes pink and moist with no lesions. Oropharynx without erythema, or exudate.   Neck: Supple. +palpable, tender lymph node posterior cervical chain. Mobile.   Ext: no edema, normal color and temperature.   Skin: No rashes or lesions in visible areas  Gait steady.     Assessment and Plan:   Assessment/Plan:  1. Multiple thyroid nodules  Size does qualify for fine needle biopsy, but nodules are isocheoic and hyperechoic, therefore very low cancer risk. We have discussed option of biopsy d/t size >1 cm, but we have decided to monitor closely instead and check additional labs. F/u 6-8 weeks   - THYROID PEROXIDASE  (TPO) AB; Future  - ANTITHYROGLOBULIN AB; Future  - TSH WITH REFLEX TO FT4; Future  - TRIIDOTHYRONINE; " Future    2. Acute cervical adenitis  Enc Ibuprofen 600 mg twice daily with food for 7 days. Apply ice. Monitor in 6-8 weeks     The total time spent seeing the patient in consultation, and formulating an action plan for this visit was 15 minutes.  Greater than 50% of this time was spent face to face counseling, discussing problems documented above, coordinating care and answering questions by the provider.        Follow up:  Return in about 8 weeks (around 9/14/2018).    Educated in proper administration of medication(s) ordered today including safety, possible SE, risks, benefits, rationale and alternatives to therapy.   Supportive care, differential diagnoses, and indications for immediate follow-up discussed with patient.    Pathogenesis of diagnosis discussed including typical length and natural progression.    Instructed to return to clinic or nearest emergency department for any change in condition, further concerns, or worsening of symptoms.  Patient states understanding of the plan of care and discharge instructions.      Please note that this dictation was created using voice recognition software. I have made every reasonable attempt to correct obvious errors, but I expect that there are errors of grammar and possibly content that I did not discover before finalizing the note.    Followup: Return in about 8 weeks (around 9/14/2018). sooner should new symptoms or problems arise.

## 2018-08-01 ENCOUNTER — SLEEP CENTER VISIT (OUTPATIENT)
Dept: SLEEP MEDICINE | Facility: MEDICAL CENTER | Age: 48
End: 2018-08-01
Payer: COMMERCIAL

## 2018-08-01 VITALS
HEART RATE: 91 BPM | BODY MASS INDEX: 32.43 KG/M2 | DIASTOLIC BLOOD PRESSURE: 70 MMHG | SYSTOLIC BLOOD PRESSURE: 126 MMHG | OXYGEN SATURATION: 95 % | WEIGHT: 183 LBS | HEIGHT: 63 IN | RESPIRATION RATE: 16 BRPM

## 2018-08-01 DIAGNOSIS — G47.10 HYPERSOMNOLENCE: ICD-10-CM

## 2018-08-01 DIAGNOSIS — R05.3 CHRONIC COUGH: ICD-10-CM

## 2018-08-01 DIAGNOSIS — G47.33 OBSTRUCTIVE SLEEP APNEA SYNDROME: ICD-10-CM

## 2018-08-01 DIAGNOSIS — R06.83 SNORING: ICD-10-CM

## 2018-08-01 PROCEDURE — 99244 OFF/OP CNSLTJ NEW/EST MOD 40: CPT | Performed by: INTERNAL MEDICINE

## 2018-08-06 NOTE — PROGRESS NOTES
CC:  Snoring, witnessed nocturnal apnea and excessive daytime somnolence, suspected sleep apnea hypopnea syndrome.    HPI:   Ms. Sheldon is a 47-year-old woman referred by ALL Garcia, to assist in the evaluation and management of known sleep-disordered breathing.    She was evaluated in 2016 by Dr. Agarwal for snoring, witnessed nocturnal apnea and excessive daytime somnolence.  A home sleep test on April 19, 2016 demonstrated an apnea hypopnea index of 37 events per hour with a lowest arterial oxygen saturation of 73% on room air and a possible tachycardia/bradycardia response to that hypoxemia.  She apparently underwent a trial of auto titrating CPAP at home and was assigned to CPAP at 11 cm water pressure.  She acclimated well to therapy and continues to use the machine each night, throughout the night.  She is not having unusual problems with mask fit, leakage or airway dryness.  She enjoys reasonable levels of daytime alertness and is not napping or falling asleep inappropriately.    She has a regular sleep schedule with bedtime at about 9 PM and she falls asleep within a few minutes.  She arises at 4:30 in the morning on workdays and sleeps until 6:30 on weekends.  The spousal questionnaire indicates snoring and pauses in breathing before treatment but she is apparently not snoring on CPAP.  She was also quite sleepy before treatment with an [place] sleepiness score is 15 points but now enjoys normal levels of daytime alertness.  She drinks caffeinated beverages sparingly and does not use substances to induce sleep or to maintain wakefulness.  She does not have symptoms suggesting narcolepsy, parasomnia or restless leg syndrome.    The CPAP data chip recording information is reviewed with the patient.  It demonstrates use on 29 of the last 30 days with an average daily usage of 7 hours and 40 minutes.  The estimated residual apnea hypopnea index is 0.3 events per hour.     She does have a history of  inhalation allergies treated with Nasacort regularly and over-the-counter antihistamines.  She has noted a persisting cough which is usually nonproductive and not associated with exertional dyspnea or wheezing.  She also has a history of systemic arterial hypertension treated with lisinopril and type 2 diabetes mellitus treated with metformin.  She has a history of thyroid nodules.        Patient Active Problem List    Diagnosis Date Noted   • Multiple thyroid nodules 07/20/2018   • Fatty liver 06/22/2018   • Chronic dryness of both eyes 12/27/2016   • BMI 33.0-33.9,adult 12/27/2016   • Vitamin D deficiency 09/22/2016   • TED on CPAP 03/23/2016   • Hyperactivity (behavior)    • Microalbuminuria 06/11/2014   • Diabetes mellitus, type 2 (HCC)    • Essential hypertension    • Dyslipidemia        Past Medical History:   Diagnosis Date   • Anxiety    • Diabetes mellitus, type 2 (HCC)    • Dyslipidemia    • HTN (hypertension)    • Sleep apnea        Past Surgical History:   Procedure Laterality Date   • HYSTEROSCOPY NOVASURE-2  2010    Dr. Sandoval   • ABDOMINOPLASTY  1999    Dr. 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     Social History Main Topics   • Smoking status: Never Smoker   • Smokeless tobacco: Never Used   • Alcohol use 4.2 - 8.4 oz/week     7 - 14 Glasses of wine per week   • Drug use: No   • Sexual activity: Yes     Partners:  "Male     Birth control/ protection: IUD      Comment: IUD Dec 2016     Other Topics Concern   • Not on file     Social History Narrative   • No narrative on file       Current Outpatient Prescriptions   Medication Sig Dispense Refill   • lisinopril-hydrochlorothiazide (PRINZIDE, ZESTORETIC) 20-25 MG per tablet TAKE 1 TABLET BY MOUTH EVERY DAY 90 Tab 3   • metFORMIN (GLUCOPHAGE) 500 MG Tab Take 1 Tab by mouth 2 times a day, with meals. 180 Tab 3   • atorvastatin (LIPITOR) 10 MG Tab TAKE 1 TAB BY MOUTH EVERY DAY. 90 Tab 3   • Blood Glucose Monitoring Suppl SUPPLIES Misc Test strips order: Test strips for One Touch Ultra 2 meter. Sig: use twice daily and prn ssx high or low sugar #100 RF x 3 100 Each 3   • Lancets Misc 1 Each by Does not apply route 3 times a day. 100 Each 2   • levonorgestrel (MIRENA) 20 MCG/24HR IUD 1 Each by Intrauterine route Once.       No current facility-administered medications for this visit.     \"CURRENT RX\"      Allergies: Patient has no known allergies.      ROS  Positive for the sleep, respiratory, cardiac and endocrine issues reviewed above.  All other aspects of the CPT review of systems process are negative as documented in the attached self history form.      Physical Exam:   /70   Pulse 91   Resp 16   Ht 1.6 m (5' 3\")   Wt 83 kg (183 lb)   SpO2 95%   BMI 32.42 kg/m²    Head and neck examination demonstrates no mucosal lesion, purulent drainage or evident polyps. The pharynx is benign with a Mallampati III presentation. The neck is supple without thyromegaly. On chest examination there are symmetrical bilateral breath sounds without rales, wheezing or consolidation. On cardiac examination, the apical impulse and heart sounds are normal and the rhythm is regular. There is no murmur, gallop or rub and no jugular venous distention. The abdomen is soft with active bowel sounds and no palpable hepatosplenomegaly, mass, guarding or rebound. The extremities show no clubbing, " cyanosis or edema and no signs of deep venous thrombosis. There is no warmth, redness, tenderness or palpable venous cord in the calves. The skin is clear, warm and dry. There is no unusual peripheral lymphadenopathy. Peripheral pulses are palpable in all 4 extremities. On neurologic examination, cranial nerve function is intact, motor tone is symmetrical, and the patient is alert, oriented and responsive.       Problems:  1. Obstructive sleep apnea syndrome  She has severe obstructive sleep apnea hypopnea syndrome with  an apnea hypopnea index of 37 events per hour with a lowest arterial oxygen saturation of 73% on room air. She is doing well on CPAP at 11 cm water pressure, using the machine nightly for an adequate number of hours, as confirmed by the data recording chip.  She enjoys reasonable levels of daytime alertness and the chip suggests a low residual apnea hypopnea index.  Adjustment of therapy is not required at this point would continue therapy is medically necessary to maintain levels of daytime alertness and reduce the cardiac and neurologic risks associated with untreated sleep apnea hypopnea.    2. Hypersomnolence  Improved on CPAP.    3. Snoring  Improved on CPAP.    4. Chronic cough  She does have inhalation allergies and this could be a sinopulmonary syndrome but asthma might also be considered.  There is little to suggest active infection, congestive heart failure, interstitial lung disease or thromboembolism.  With the chronicity of symptoms, evaluation is indicated.    5. BMI 32.0-32.9,adult  We have discussed the role of weight management and the treatment program for his sleep-disordered breathing. I recommend continued efforts using diet and exercise to achieve progressive weight loss.      Plan:   1.  Continue CPAP at 11 cm water pressure.  A prescription is written for new mask and supplies as needed.    2.  Pulmonary function study and chest radiogram.    3.  Return visit here after  testing to review results and treatment options.    We appreciate the opportunity to assist in her care.  Return in about 1 month (around 9/1/2018).

## 2018-10-01 ENCOUNTER — OFFICE VISIT (OUTPATIENT)
Dept: PULMONOLOGY | Facility: HOSPICE | Age: 48
End: 2018-10-01
Payer: COMMERCIAL

## 2018-10-01 ENCOUNTER — APPOINTMENT (OUTPATIENT)
Dept: RADIOLOGY | Facility: IMAGING CENTER | Age: 48
End: 2018-10-01
Attending: INTERNAL MEDICINE
Payer: COMMERCIAL

## 2018-10-01 ENCOUNTER — NON-PROVIDER VISIT (OUTPATIENT)
Dept: PULMONOLOGY | Facility: HOSPICE | Age: 48
End: 2018-10-01
Payer: COMMERCIAL

## 2018-10-01 VITALS — WEIGHT: 185 LBS | HEIGHT: 63 IN | BODY MASS INDEX: 32.78 KG/M2

## 2018-10-01 VITALS
SYSTOLIC BLOOD PRESSURE: 110 MMHG | HEIGHT: 63 IN | TEMPERATURE: 98.2 F | WEIGHT: 185 LBS | BODY MASS INDEX: 32.78 KG/M2 | DIASTOLIC BLOOD PRESSURE: 74 MMHG | RESPIRATION RATE: 16 BRPM | OXYGEN SATURATION: 95 % | HEART RATE: 82 BPM

## 2018-10-01 DIAGNOSIS — R05.3 CHRONIC COUGH: ICD-10-CM

## 2018-10-01 DIAGNOSIS — R05.9 COUGH: ICD-10-CM

## 2018-10-01 PROCEDURE — 99243 OFF/OP CNSLTJ NEW/EST LOW 30: CPT | Mod: 25 | Performed by: INTERNAL MEDICINE

## 2018-10-01 PROCEDURE — 71046 X-RAY EXAM CHEST 2 VIEWS: CPT | Mod: TC,FY | Performed by: INTERNAL MEDICINE

## 2018-10-01 PROCEDURE — 94726 PLETHYSMOGRAPHY LUNG VOLUMES: CPT | Performed by: INTERNAL MEDICINE

## 2018-10-01 PROCEDURE — 94060 EVALUATION OF WHEEZING: CPT | Performed by: INTERNAL MEDICINE

## 2018-10-01 PROCEDURE — 94729 DIFFUSING CAPACITY: CPT | Performed by: INTERNAL MEDICINE

## 2018-10-01 ASSESSMENT — PULMONARY FUNCTION TESTS
FVC_PREDICTED: 3.43
FVC_PERCENT_PREDICTED: 89
FEV1/FVC_PERCENT_PREDICTED: 106
FEV1_PERCENT_CHANGE: 0
FEV1/FVC_PERCENT_PREDICTED: 80
FEV1/FVC: 86.97
FVC_LLN: 2.86
FEV1/FVC: 87
FEV1_PERCENT_PREDICTED: 96
FEV1: 2.67
FVC_PERCENT_PREDICTED: 90
FEV1_PERCENT_PREDICTED: 97
FEV1/FVC_PERCENT_LLN: 67
FVC: 3.07
FEV1: 2.63
FEV1/FVC_PERCENT_PREDICTED: 109
FEV1_PREDICTED: 2.73
FEV1_LLN: 2.28
FEV1/FVC_PREDICTED: 80
FEV1/FVC_PERCENT_PREDICTED: 108
FEV1/FVC: 85
FVC: 3.1
FEV1/FVC: 85
FEV1/FVC_PERCENT_PREDICTED: 107
FEV1_PERCENT_CHANGE: 1
FEV1/FVC_PERCENT_CHANGE: 2

## 2018-10-01 NOTE — PATIENT INSTRUCTIONS
This Lady comes in for evaluation of cough, is been seen in the sleep center and treated with CPAP.  The cough assessment today was a chest x-ray which is entirely clear, her exam of her lungs is clear, and lung function testing is superb, spirometry volumes and oxygen transfer are all normal.  She is undergoing assessment for thyroid nodules by her primary care doctor.    She does have a body build that would put her at risk for gastroesophageal reflux, and I talked to her about elevating the head of her bed avoiding large meals at night, no tight belts and symptoms seem to be primarily morning and after lunch.    I do not think she has reactive airway disease, the only other consideration is that she is on lisinopril and is been on this for over 20 years.  Prior blood pressure was quite elevated, currently very well controlled.  I doubt if this is the trigger for her cough given the minimal impact, but if this becomes more symptomatic she will talk to her primary care doctor about an alternative to lisinopril.  In the meantime she does not require inhalers or anything specific for lung related problems, x-ray and lung function are remarkably good.  Finally she does have elevated body mass index at 32.7, and has been using exercise and gentle portion control to try to adjust this downward.  We will see her annually or as needed

## 2018-10-01 NOTE — PROGRESS NOTES
Dora Sheldon is a 48 y.o. female here for cough and asthma evaluation. Patient was referred by her primary care doctor.    History of Present Illness:      This Lady comes in for evaluation of cough, is been seen in the sleep center and treated with CPAP.  The cough assessment today was a chest x-ray which is entirely clear, her exam of her lungs is clear, and lung function testing is superb, spirometry volumes and oxygen transfer are all normal.  She is undergoing assessment for thyroid nodules by her primary care doctor.    She does have a body build that would put her at risk for gastroesophageal reflux, and I talked to her about elevating the head of her bed avoiding large meals at night, no tight belts and symptoms seem to be primarily morning and after lunch.    I do not think she has reactive airway disease, the only other consideration is that she is on lisinopril and is been on this for over 20 years.  Prior blood pressure was quite elevated, currently very well controlled.  I doubt if this is the trigger for her cough given the minimal impact, but if this becomes more symptomatic she will talk to her primary care doctor about an alternative to lisinopril.  In the meantime she does not require inhalers or anything specific for lung related problems, x-ray and lung function are remarkably good.  Finally she does have elevated body mass index at 32.7, and has been using exercise and gentle portion control to try to adjust this downward.  We will see her annually or as needed    Constitutional ROS: No unexpected change in weight, No unexplained fevers  Eyes: No change in vision or blurring or double vision  Mouth/Throat ROS: No sore throat, No recent change in voice or hoarseness  Pulmonary ROS: See present history for pertinent positives  Cardiovascular ROS: No chest pain to suggest acute coronary syndrome  Gastrointestinal ROS: No abdominal pain to suggest peptic disease  Musculoskeletal/Extremities ROS: no  acute artritis or unusual swelling  Hematologic/Lymphatic ROS: No easy bleeding or unusual lymph node swelling  Neurologic ROS: No new or unusual weakness  Psychiatric ROS: No hallucinations  Allergic/Immunologic: No  urticaria or allergic rash      Current Outpatient Prescriptions   Medication Sig Dispense Refill   • lisinopril-hydrochlorothiazide (PRINZIDE, ZESTORETIC) 20-25 MG per tablet TAKE 1 TABLET BY MOUTH EVERY DAY 90 Tab 3   • metFORMIN (GLUCOPHAGE) 500 MG Tab Take 1 Tab by mouth 2 times a day, with meals. 180 Tab 3   • atorvastatin (LIPITOR) 10 MG Tab TAKE 1 TAB BY MOUTH EVERY DAY. 90 Tab 3   • Blood Glucose Monitoring Suppl SUPPLIES Misc Test strips order: Test strips for One Touch Ultra 2 meter. Sig: use twice daily and prn ssx high or low sugar #100 RF x 3 100 Each 3   • Lancets Misc 1 Each by Does not apply route 3 times a day. 100 Each 2   • levonorgestrel (MIRENA) 20 MCG/24HR IUD 1 Each by Intrauterine route Once.       No current facility-administered medications for this visit.        Social History   Substance Use Topics   • Smoking status: Never Smoker   • Smokeless tobacco: Never Used   • Alcohol use 4.2 - 8.4 oz/week     7 - 14 Glasses of wine per week        Past Medical History:   Diagnosis Date   • Anxiety    • Diabetes mellitus, type 2 (HCC)    • Dyslipidemia    • HTN (hypertension)    • Sleep apnea        Past Surgical History:   Procedure Laterality Date   • HYSTEROSCOPY NOVASURE-2 2010    Dr. Sandoval   • ABDOMINOPLASTY  1999    Dr. Ricci   • TONSILLECTOMY         Allergies: Patient has no known allergies.    Family History   Problem Relation Age of Onset   • Hypertension Mother    • Hypertension Father    • Heart Attack Father    • Psychiatry Paternal Uncle    • Heart Disease Maternal Grandmother    • Hypertension Maternal Grandmother    • Heart Disease Maternal Grandfather    • Hypertension Maternal Grandfather    • Diabetes Maternal Uncle    • Sleep Apnea Neg Hx        Physical  "Examination    Vitals:    10/01/18 1616   Height: 1.6 m (5' 3\")   Weight: 83.9 kg (185 lb)   Weight % change since last entry.: 0 %   BP: 110/74   Pulse: 82   BMI (Calculated): 32.77   Resp: 16   Temp: 36.8 °C (98.2 °F)       General Appearance: alert, no distress  Skin: Skin color, texture, turgor normal. No rashes or lesions.  Eyes: negative  Oropharynx: Lips, mucosa, and tongue normal. Teeth and gums normal. Oropharynx moist and without lesion  Lungs: positive findings: Remarkably clear without wheezes  Heart: negative. RRR without murmur, gallop, or rubs.  No ectopy.  Abdomen: Abdomen soft, non-tender. . No masses,  No organomegaly  Extremities:  No deformities, edema, or skin discoloration  Joints: No acute arthritis  Peripheral Pulses:perfused  Neurologic: intact grossly      II (soft palate, uvula, fauces visible)    Imaging: Chest x-ray is clear    PFTS: Normal      Assessment and Plan  1. Cough  Possible GERD, possible lisinopril, no significant postnasal drip or allergies and no signs of asthma or chest x-ray abnormalities    2. BMI 33.0-33.9,adult  Body mass index is 32.77 kg/m².        Followup Return in about 1 year (around 10/1/2019) for follow up visit with Dr. India Boyd.    "

## 2018-10-01 NOTE — PROCEDURES
Technician: CHACORTA Peguero    Technician Comment:  Good patient effort & cooperation.  The results of this test meet the ATS/ERS standards for acceptability & reproducibility.  Test was performed on the [employer] Body Plethysmograph-Elite DX system.  Predicted values were NHanes-3 for spirometry, [facility] for DLCO, ITS for Lung Volumes.  The DLCO was uncorrected for Hgb.  A bronchodilator of Ventolin HFA -2puffs via spacer administered.  DLCO performed during dilation period.    Interpretation:

## 2018-10-31 ENCOUNTER — APPOINTMENT (RX ONLY)
Dept: URBAN - METROPOLITAN AREA CLINIC 20 | Facility: CLINIC | Age: 48
Setting detail: DERMATOLOGY
End: 2018-10-31

## 2018-10-31 DIAGNOSIS — L81.1 CHLOASMA: ICD-10-CM

## 2018-10-31 PROCEDURE — ? ADDITIONAL NOTES

## 2018-10-31 PROCEDURE — ? COUNSELING

## 2018-10-31 PROCEDURE — ? PRESCRIPTION

## 2018-10-31 PROCEDURE — 99212 OFFICE O/P EST SF 10 MIN: CPT

## 2018-10-31 RX ORDER — FLUOCINOLONE ACETONIDE, HYDROQUINONE, AND TRETINOIN .1; 40; .5 MG/G; MG/G; MG/G
CREAM TOPICAL QHS
Qty: 1 | Refills: 2 | Status: ERX | COMMUNITY
Start: 2018-10-31

## 2018-10-31 RX ADMIN — FLUOCINOLONE ACETONIDE, HYDROQUINONE, AND TRETINOIN 1: .1; 40; .5 CREAM TOPICAL at 00:00

## 2018-10-31 ASSESSMENT — LOCATION DETAILED DESCRIPTION DERM
LOCATION DETAILED: RIGHT LATERAL MALAR CHEEK
LOCATION DETAILED: RIGHT MEDIAL FOREHEAD
LOCATION DETAILED: LEFT CENTRAL MALAR CHEEK

## 2018-10-31 ASSESSMENT — LOCATION ZONE DERM: LOCATION ZONE: FACE

## 2018-10-31 ASSESSMENT — LOCATION SIMPLE DESCRIPTION DERM
LOCATION SIMPLE: RIGHT CHEEK
LOCATION SIMPLE: RIGHT FOREHEAD
LOCATION SIMPLE: LEFT CHEEK

## 2018-12-11 ENCOUNTER — HOSPITAL ENCOUNTER (OUTPATIENT)
Dept: LAB | Facility: MEDICAL CENTER | Age: 48
End: 2018-12-11
Attending: NURSE PRACTITIONER
Payer: COMMERCIAL

## 2018-12-11 ENCOUNTER — HOSPITAL ENCOUNTER (OUTPATIENT)
Dept: LAB | Facility: MEDICAL CENTER | Age: 48
End: 2018-12-11
Attending: OBSTETRICS & GYNECOLOGY
Payer: COMMERCIAL

## 2018-12-11 DIAGNOSIS — Z00.00 ROUTINE HEALTH MAINTENANCE: ICD-10-CM

## 2018-12-11 DIAGNOSIS — E11.9 TYPE 2 DIABETES MELLITUS WITHOUT COMPLICATION, WITHOUT LONG-TERM CURRENT USE OF INSULIN (HCC): ICD-10-CM

## 2018-12-11 DIAGNOSIS — I10 ESSENTIAL HYPERTENSION: ICD-10-CM

## 2018-12-11 LAB
ALBUMIN SERPL BCP-MCNC: 4.7 G/DL (ref 3.2–4.9)
ALBUMIN/GLOB SERPL: 1.5 G/DL
ALP SERPL-CCNC: 65 U/L (ref 30–99)
ALT SERPL-CCNC: 27 U/L (ref 2–50)
ANION GAP SERPL CALC-SCNC: 9 MMOL/L (ref 0–11.9)
AST SERPL-CCNC: 17 U/L (ref 12–45)
BASOPHILS # BLD AUTO: 0.5 % (ref 0–1.8)
BASOPHILS # BLD: 0.05 K/UL (ref 0–0.12)
BILIRUB SERPL-MCNC: 1.3 MG/DL (ref 0.1–1.5)
BUN SERPL-MCNC: 13 MG/DL (ref 8–22)
CALCIUM SERPL-MCNC: 10.4 MG/DL (ref 8.5–10.5)
CHLORIDE SERPL-SCNC: 102 MMOL/L (ref 96–112)
CHOLEST SERPL-MCNC: 128 MG/DL (ref 100–199)
CO2 SERPL-SCNC: 28 MMOL/L (ref 20–33)
CREAT SERPL-MCNC: 0.58 MG/DL (ref 0.5–1.4)
CREAT UR-MCNC: 212.5 MG/DL
EOSINOPHIL # BLD AUTO: 0.12 K/UL (ref 0–0.51)
EOSINOPHIL NFR BLD: 1.2 % (ref 0–6.9)
ERYTHROCYTE [DISTWIDTH] IN BLOOD BY AUTOMATED COUNT: 43.5 FL (ref 35.9–50)
ESTRADIOL SERPL-MCNC: 131 PG/ML
FASTING STATUS PATIENT QL REPORTED: NORMAL
FSH SERPL-ACNC: 4 MIU/ML
GLOBULIN SER CALC-MCNC: 3.1 G/DL (ref 1.9–3.5)
GLUCOSE SERPL-MCNC: 168 MG/DL (ref 65–99)
HCT VFR BLD AUTO: 39.9 % (ref 37–47)
HDLC SERPL-MCNC: 42 MG/DL
HGB BLD-MCNC: 13.4 G/DL (ref 12–16)
IMM GRANULOCYTES # BLD AUTO: 0.02 K/UL (ref 0–0.11)
IMM GRANULOCYTES NFR BLD AUTO: 0.2 % (ref 0–0.9)
LDLC SERPL CALC-MCNC: 66 MG/DL
LYMPHOCYTES # BLD AUTO: 2.1 K/UL (ref 1–4.8)
LYMPHOCYTES NFR BLD: 21.8 % (ref 22–41)
MCH RBC QN AUTO: 31.2 PG (ref 27–33)
MCHC RBC AUTO-ENTMCNC: 33.6 G/DL (ref 33.6–35)
MCV RBC AUTO: 93 FL (ref 81.4–97.8)
MICROALBUMIN UR-MCNC: 6 MG/DL
MICROALBUMIN/CREAT UR: 28 MG/G (ref 0–30)
MONOCYTES # BLD AUTO: 0.63 K/UL (ref 0–0.85)
MONOCYTES NFR BLD AUTO: 6.5 % (ref 0–13.4)
NEUTROPHILS # BLD AUTO: 6.72 K/UL (ref 2–7.15)
NEUTROPHILS NFR BLD: 69.8 % (ref 44–72)
NRBC # BLD AUTO: 0 K/UL
NRBC BLD-RTO: 0 /100 WBC
PLATELET # BLD AUTO: 264 K/UL (ref 164–446)
PMV BLD AUTO: 9.3 FL (ref 9–12.9)
POTASSIUM SERPL-SCNC: 3.8 MMOL/L (ref 3.6–5.5)
PROGEST SERPL-MCNC: 7.61 NG/ML
PROT SERPL-MCNC: 7.8 G/DL (ref 6–8.2)
RBC # BLD AUTO: 4.29 M/UL (ref 4.2–5.4)
SODIUM SERPL-SCNC: 139 MMOL/L (ref 135–145)
TRIGL SERPL-MCNC: 101 MG/DL (ref 0–149)
TSH SERPL DL<=0.005 MIU/L-ACNC: 0.98 UIU/ML (ref 0.38–5.33)
WBC # BLD AUTO: 9.6 K/UL (ref 4.8–10.8)

## 2018-12-11 PROCEDURE — 84144 ASSAY OF PROGESTERONE: CPT

## 2018-12-11 PROCEDURE — 36415 COLL VENOUS BLD VENIPUNCTURE: CPT

## 2018-12-11 PROCEDURE — 82043 UR ALBUMIN QUANTITATIVE: CPT

## 2018-12-11 PROCEDURE — 83001 ASSAY OF GONADOTROPIN (FSH): CPT

## 2018-12-11 PROCEDURE — 84443 ASSAY THYROID STIM HORMONE: CPT

## 2018-12-11 PROCEDURE — 82670 ASSAY OF TOTAL ESTRADIOL: CPT

## 2018-12-11 PROCEDURE — 83036 HEMOGLOBIN GLYCOSYLATED A1C: CPT

## 2018-12-11 PROCEDURE — 82570 ASSAY OF URINE CREATININE: CPT

## 2018-12-11 PROCEDURE — 80061 LIPID PANEL: CPT

## 2018-12-11 PROCEDURE — 80053 COMPREHEN METABOLIC PANEL: CPT

## 2018-12-11 PROCEDURE — 85025 COMPLETE CBC W/AUTO DIFF WBC: CPT

## 2018-12-12 ENCOUNTER — OFFICE VISIT (OUTPATIENT)
Dept: MEDICAL GROUP | Facility: PHYSICIAN GROUP | Age: 48
End: 2018-12-12
Payer: COMMERCIAL

## 2018-12-12 VITALS
SYSTOLIC BLOOD PRESSURE: 136 MMHG | WEIGHT: 185 LBS | DIASTOLIC BLOOD PRESSURE: 82 MMHG | HEIGHT: 63 IN | TEMPERATURE: 97.7 F | HEART RATE: 83 BPM | BODY MASS INDEX: 32.78 KG/M2 | OXYGEN SATURATION: 95 %

## 2018-12-12 DIAGNOSIS — R05.8 DRY COUGH: ICD-10-CM

## 2018-12-12 DIAGNOSIS — R80.9 MICROALBUMINURIA: ICD-10-CM

## 2018-12-12 DIAGNOSIS — M67.432 GANGLION CYST OF WRIST, LEFT: ICD-10-CM

## 2018-12-12 DIAGNOSIS — E11.9 TYPE 2 DIABETES MELLITUS WITHOUT COMPLICATION, WITHOUT LONG-TERM CURRENT USE OF INSULIN (HCC): ICD-10-CM

## 2018-12-12 DIAGNOSIS — M25.69 BACK STIFFNESS: ICD-10-CM

## 2018-12-12 DIAGNOSIS — E04.2 MULTIPLE THYROID NODULES: ICD-10-CM

## 2018-12-12 DIAGNOSIS — E78.5 DYSLIPIDEMIA: ICD-10-CM

## 2018-12-12 DIAGNOSIS — M25.552 BILATERAL HIP PAIN: ICD-10-CM

## 2018-12-12 DIAGNOSIS — M25.551 BILATERAL HIP PAIN: ICD-10-CM

## 2018-12-12 DIAGNOSIS — I10 ESSENTIAL HYPERTENSION: ICD-10-CM

## 2018-12-12 LAB
EST. AVERAGE GLUCOSE BLD GHB EST-MCNC: 174 MG/DL
HBA1C MFR BLD: 7.5 % (ref ?–5.8)
HBA1C MFR BLD: 7.7 % (ref 0–5.6)
INT CON NEG: NEGATIVE
INT CON POS: POSITIVE

## 2018-12-12 PROCEDURE — 99214 OFFICE O/P EST MOD 30 MIN: CPT | Performed by: NURSE PRACTITIONER

## 2018-12-12 PROCEDURE — 83036 HEMOGLOBIN GLYCOSYLATED A1C: CPT | Performed by: NURSE PRACTITIONER

## 2018-12-12 RX ORDER — LOSARTAN POTASSIUM AND HYDROCHLOROTHIAZIDE 25; 100 MG/1; MG/1
0.5 TABLET ORAL DAILY
Qty: 45 TAB | Refills: 3 | Status: SHIPPED | OUTPATIENT
Start: 2018-12-12 | End: 2018-12-27

## 2018-12-12 RX ORDER — ATORVASTATIN CALCIUM 10 MG/1
10 TABLET, FILM COATED ORAL
Qty: 90 TAB | Refills: 3 | Status: SHIPPED | OUTPATIENT
Start: 2018-12-12 | End: 2019-05-22 | Stop reason: SDUPTHER

## 2018-12-12 NOTE — ASSESSMENT & PLAN NOTE
Ongoing issue for this year with two thyroid nodules >10 mm in size, but isometric therefore we decided to monitor rather go straight to biopsy. Due now for repeat US. TFTs WNL    7/18/2018 12:08 PM    HISTORY/REASON FOR EXAM:  Mass/Lump  Cervical adenopathy    TECHNIQUE/EXAM DESCRIPTION:  Ultrasound of the soft tissues of the head and neck.    COMPARISON:  None    FINDINGS:  The thyroid gland is heterogeneous.  Vascularity is normal.    The right lobe of the thyroid gland measures 6.7 cm x 2.2 cm x 1.7 cm.  The left lobe of the thyroid gland measures 5.9 cm x 1.3 cm x 2.1 cm.    A heterogeneous slightly hyperechoic nodule in the mid right thyroid lobe measures approximately 1.5 x 1.4 x 2.4 cm.    A heterogeneous isoechoic nodule in the left thyroid lobe is poorly defined and measures approximately 1.5 x 1.3 x 2.6 cm. A second poorly defined isoechoic nodule measures approximately 1.6 x 1 x 1.9 cm.    A normal appearing cervical lymph node correlates with palpable abnormality on the right.   Impression       1.  Thyroid enlargement with poorly defined thyroid nodules.    2.  No adenopathy is appreciated.        Ref. Range 7/20/2018 09:07 12/11/2018 09:11   TSH Latest Ref Range: 0.380 - 5.330 uIU/mL 0.870 0.980   T3 Latest Ref Range: 60.0 - 181.0 ng/dL 93.8    Microsomal -Tpo- Abs Latest Ref Range: 0.0 - 9.0 IU/mL 1.1    Anti-Thyroglobulin Latest Ref Range: 0.0 - 4.0 IU/mL <0.9

## 2018-12-12 NOTE — ASSESSMENT & PLAN NOTE
Controlled: stable  Medications: metformin 500 mg BID  Glucose at home:  Hypoglycemia episodes: none, but she notices that glucose levels <120 cause her to feel ill like flu-like. Her glucometer is about 10 years old    Statin: yes  ACEI/ARB: yes   Exercise: on and off exercising  Nutrition counseling last: this past year   Food intake: She stopped drinking alcohol since August   Ref. Range 1/26/2018 10:47 12/11/2018 09:11   Glucose Latest Ref Range: 65 - 99 mg/dL 133 (H) 168 (H)

## 2018-12-12 NOTE — ASSESSMENT & PLAN NOTE
On atorvastatin. Does not experience myalgias   Ref. Range 1/26/2018 10:47 12/11/2018 09:11   Cholesterol,Tot Latest Ref Range: 100 - 199 mg/dL 135 128   Triglycerides Latest Ref Range: 0 - 149 mg/dL 63 101   HDL Latest Ref Range: >=40 mg/dL 42 42   LDL Latest Ref Range: <100 mg/dL 80 66

## 2018-12-12 NOTE — ASSESSMENT & PLAN NOTE
She has a dry cough daily. She feels well, but other people at work notice this. PFT WNL. Sleep apnea stable. Does wear CPAP, and they have discussed this may contribute. She is on lisinopril as well.

## 2018-12-12 NOTE — ASSESSMENT & PLAN NOTE
Ref. Range 1/26/2018 10:47 12/11/2018 09:11   Micro Alb Creat Ratio Latest Ref Range: 0 - 30 mg/g 27 28   Creatinine, Urine Latest Units: mg/dL 127.40 212.50   Microalbumin, Urine Random Latest Units: mg/dL 3.5 6.0

## 2018-12-12 NOTE — ASSESSMENT & PLAN NOTE
Chronic problem that is stable on current lisinopril-hctz medication. Does not monitor bp at home. Maybe cause of dry cough   Vitals 7/20/2018 8/1/2018 10/1/2018 12/12/2018   SYSTOLIC 120 126 110 136   DIASTOLIC 78 70 74 82   PULSE 90 91 82 83

## 2018-12-12 NOTE — PROGRESS NOTES
Subjective:     Chief Complaint   Patient presents with   • Diabetes     lab review   • Thyroid Problem     nodes       HPI  Dora Sheldon is a 48 y.o. female here today for routine f/u. 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  She was doing very well with therapy there and would like to go to the same place for maintenance long-term.  There is no worsening pain or radiculopathy symptoms.    Additionally, she informs me that she has had a lump on her left wrist that is causing shooting pains into her thumb.  It has been present for a couple of months and does get larger and smaller, but never resolves.    Diabetes mellitus, type 2  Controlled: stable  Medications: metformin 500 mg BID  Glucose at home:  Hypoglycemia episodes: none, but she notices that glucose levels <120 cause her to feel ill like flu-like. Her glucometer is about 10 years old    Statin: yes  ACEI/ARB: yes   Exercise: on and off exercising  Nutrition counseling last: this past year   Food intake: She stopped drinking alcohol since August   Ref. Range 1/26/2018 10:47 12/11/2018 09:11   Glucose Latest Ref Range: 65 - 99 mg/dL 133 (H) 168 (H)       Dyslipidemia  On atorvastatin. Does not experience myalgias   Ref. Range 1/26/2018 10:47 12/11/2018 09:11   Cholesterol,Tot Latest Ref Range: 100 - 199 mg/dL 135 128   Triglycerides Latest Ref Range: 0 - 149 mg/dL 63 101   HDL Latest Ref Range: >=40 mg/dL 42 42   LDL Latest Ref Range: <100 mg/dL 80 66       Microalbuminuria     Ref. Range 1/26/2018 10:47 12/11/2018 09:11   Micro Alb Creat Ratio Latest Ref Range: 0 - 30 mg/g 27 28   Creatinine, Urine Latest Units: mg/dL 127.40 212.50   Microalbumin, Urine Random Latest Units: mg/dL 3.5 6.0       Multiple thyroid nodules  Ongoing issue for this year with two thyroid nodules >10 mm in size, but isometric therefore we decided to monitor rather go straight to  biopsy. Due now for repeat US. TFTs WNL    7/18/2018 12:08 PM    HISTORY/REASON FOR EXAM:  Mass/Lump  Cervical adenopathy    TECHNIQUE/EXAM DESCRIPTION:  Ultrasound of the soft tissues of the head and neck.    COMPARISON:  None    FINDINGS:  The thyroid gland is heterogeneous.  Vascularity is normal.    The right lobe of the thyroid gland measures 6.7 cm x 2.2 cm x 1.7 cm.  The left lobe of the thyroid gland measures 5.9 cm x 1.3 cm x 2.1 cm.    A heterogeneous slightly hyperechoic nodule in the mid right thyroid lobe measures approximately 1.5 x 1.4 x 2.4 cm.    A heterogeneous isoechoic nodule in the left thyroid lobe is poorly defined and measures approximately 1.5 x 1.3 x 2.6 cm. A second poorly defined isoechoic nodule measures approximately 1.6 x 1 x 1.9 cm.    A normal appearing cervical lymph node correlates with palpable abnormality on the right.   Impression       1.  Thyroid enlargement with poorly defined thyroid nodules.    2.  No adenopathy is appreciated.        Ref. Range 7/20/2018 09:07 12/11/2018 09:11   TSH Latest Ref Range: 0.380 - 5.330 uIU/mL 0.870 0.980   T3 Latest Ref Range: 60.0 - 181.0 ng/dL 93.8    Microsomal -Tpo- Abs Latest Ref Range: 0.0 - 9.0 IU/mL 1.1    Anti-Thyroglobulin Latest Ref Range: 0.0 - 4.0 IU/mL <0.9        Dry cough  She has a dry cough daily. She feels well, but other people at work notice this. PFT WNL. Sleep apnea stable. Does wear CPAP, and they have discussed this may contribute. She is on lisinopril as well.     Essential hypertension  Chronic problem that is stable on current lisinopril-hctz medication. Does not monitor bp at home. Maybe cause of dry cough   Vitals 7/20/2018 8/1/2018 10/1/2018 12/12/2018   SYSTOLIC 120 126 110 136   DIASTOLIC 78 70 74 82   PULSE 90 91 82 83            Diagnoses of Multiple thyroid nodules, Essential hypertension, Dyslipidemia, Type 2 diabetes mellitus without complication, without long-term current use of insulin (HCC),  "Microalbuminuria, Dry cough, Back stiffness, Bilateral hip pain, and Ganglion cyst of wrist, left were pertinent to this visit.    Allergies: Patient has no known allergies.  Current medicines (including changes today)  Current Outpatient Prescriptions   Medication Sig Dispense Refill   • Blood Glucose Monitoring Suppl Device Meter: Dispense Device of Insurance Preference. Sig. Use as directed for blood sugar monitoring. #1. NR. 1 Device 0   • metFORMIN (GLUCOPHAGE) 500 MG Tab Take 1 Tab by mouth 2 times a day, with meals. 180 Tab 3   • atorvastatin (LIPITOR) 10 MG Tab Take 1 Tab by mouth every day. 90 Tab 3   • losartan-hydrochlorothiazide (HYZAAR) 100-25 MG per tablet Take 0.5 Tabs by mouth every day. 45 Tab 3   • Blood Glucose Monitoring Suppl SUPPLIES Misc Test strips order: Test strips for One Touch Ultra 2 meter. Sig: use twice daily and prn ssx high or low sugar #100 RF x 3 100 Each 3   • Lancets Misc 1 Each by Does not apply route 3 times a day. 100 Each 2   • levonorgestrel (MIRENA) 20 MCG/24HR IUD 1 Each by Intrauterine route Once.       No current facility-administered medications for this visit.        She  has a past medical history of Anxiety; Diabetes mellitus, type 2 (HCC); Dyslipidemia; HTN (hypertension); and Sleep apnea.      ROS  As stated in HPI and additionally  Gen: +fatigue. No weight gain/loss  Neuro: No HA, dizziness  Neck: +nodes enlarged right  CV: No chest pain or LE edema  Endo: No polyuria or polydipsia     Objective:     Blood pressure 136/82, pulse 83, temperature 36.5 °C (97.7 °F), temperature source Temporal, height 1.6 m (5' 3\"), weight 83.9 kg (185 lb), SpO2 95 %. Body mass index is 32.77 kg/m².  Physical Exam:  General: Alert, oriented, in no acute distress.  Eye contact is good, speech goal directed, affect calm  CNs grossly intact.  HEENT: conjunctiva non-injected, sclera non-icteric, EOMs intact.   Gross hearing intact.  Oral mucous membranes pink and moist with no lesions. " Oropharynx without erythema, or exudate.   CV: regular rate and rhythm. No murmurs  Ext: no edema, normal color  MSK: Left wrist with palpable cyst 2 cm proximal from wrist radial aspect (TTP)  Skin: No rashes or lesions in visible areas  Gait steady.     Assessment and Plan:   Assessment/Plan:  1. Multiple thyroid nodules  Repeat US for monitoring. Consider biopsy   - US-SOFT TISSUES OF HEAD - NECK; Future    2. Essential hypertension  Change to losartan d/t cough. RTC for bp check in 2 weeks.   - losartan-hydrochlorothiazide (HYZAAR) 100-25 MG per tablet; Take 0.5 Tabs by mouth every day.  Dispense: 45 Tab; Refill: 3    3. Dyslipidemia  Stable   - atorvastatin (LIPITOR) 10 MG Tab; Take 1 Tab by mouth every day.  Dispense: 90 Tab; Refill: 3    4. Type 2 diabetes mellitus without complication, without long-term current use of insulin (HCC)  Stable.   - POCT Hemoglobin A1C  - Blood Glucose Monitoring Suppl Device; Meter: Dispense Device of Insurance Preference. Sig. Use as directed for blood sugar monitoring. #1. NR.  Dispense: 1 Device; Refill: 0  - INSULIN FASTING; Future  - HEMOGLOBIN A1C; Future  - metFORMIN (GLUCOPHAGE) 500 MG Tab; Take 1 Tab by mouth 2 times a day, with meals.  Dispense: 180 Tab; Refill: 3    5. Microalbuminuria  controlled  - losartan-hydrochlorothiazide (HYZAAR) 100-25 MG per tablet; Take 0.5 Tabs by mouth every day.  Dispense: 45 Tab; Refill: 3    6. Dry cough  Stop lisinopril. Change to losartan. Monitor for resolution     7. Back stiffness  - REFERRAL TO PHYSICAL THERAPY Reason for Therapy: Eval/Treat/Report    8. Bilateral hip pain  - REFERRAL TO PHYSICAL THERAPY Reason for Therapy: Eval/Treat/Report    9. Ganglion cyst of wrist, left  - REFERRAL TO ORTHOPEDICS       Follow up:  Return in about 6 months (around 6/12/2019).    Educated in proper administration of medication(s) ordered today including safety, possible SE, risks, benefits, rationale and alternatives to therapy.   Supportive  care, differential diagnoses, and indications for immediate follow-up discussed with patient.    Pathogenesis of diagnosis discussed including typical length and natural progression.    Instructed to return to clinic or nearest emergency department for any change in condition, further concerns, or worsening of symptoms.  Patient states understanding of the plan of care and discharge instructions.      Please note that this dictation was created using voice recognition software. I have made every reasonable attempt to correct obvious errors, but I expect that there are errors of grammar and possibly content that I did not discover before finalizing the note.    Followup: Return in about 6 months (around 6/12/2019). sooner should new symptoms or problems arise.

## 2018-12-27 DIAGNOSIS — I10 ESSENTIAL HYPERTENSION: ICD-10-CM

## 2018-12-27 DIAGNOSIS — R80.9 MICROALBUMINURIA: ICD-10-CM

## 2018-12-27 RX ORDER — LOSARTAN POTASSIUM AND HYDROCHLOROTHIAZIDE 12.5; 5 MG/1; MG/1
1 TABLET ORAL DAILY
Qty: 90 TAB | Refills: 3 | Status: SHIPPED | OUTPATIENT
Start: 2018-12-27 | End: 2019-05-22

## 2019-01-02 ENCOUNTER — APPOINTMENT (OUTPATIENT)
Dept: RADIOLOGY | Facility: MEDICAL CENTER | Age: 49
End: 2019-01-02
Attending: NURSE PRACTITIONER
Payer: COMMERCIAL

## 2019-01-03 ENCOUNTER — HOSPITAL ENCOUNTER (OUTPATIENT)
Dept: RADIOLOGY | Facility: MEDICAL CENTER | Age: 49
End: 2019-01-03
Attending: NURSE PRACTITIONER
Payer: COMMERCIAL

## 2019-01-03 DIAGNOSIS — E04.2 MULTIPLE THYROID NODULES: ICD-10-CM

## 2019-01-03 DIAGNOSIS — R22.1 LOCALIZED SWELLING, MASS OR LUMP OF NECK: ICD-10-CM

## 2019-01-03 DIAGNOSIS — R59.0 ADENOPATHY, CERVICAL: ICD-10-CM

## 2019-01-03 PROCEDURE — 76536 US EXAM OF HEAD AND NECK: CPT

## 2019-01-22 ENCOUNTER — PATIENT MESSAGE (OUTPATIENT)
Dept: MEDICAL GROUP | Facility: PHYSICIAN GROUP | Age: 49
End: 2019-01-22

## 2019-01-22 ENCOUNTER — NON-PROVIDER VISIT (OUTPATIENT)
Dept: MEDICAL GROUP | Facility: PHYSICIAN GROUP | Age: 49
End: 2019-01-22

## 2019-01-22 VITALS — DIASTOLIC BLOOD PRESSURE: 90 MMHG | SYSTOLIC BLOOD PRESSURE: 158 MMHG

## 2019-01-22 NOTE — PROGRESS NOTES
Dora Sheldon is a 48 y.o. female here for a non-provider visit for BP check    Vitals:    01/22/19 1236   BP: 158/90     If abnormal, was an in office provider notified today? Yes  Routed to PCP? Yes

## 2019-01-31 ENCOUNTER — TELEPHONE (OUTPATIENT)
Dept: MEDICAL GROUP | Facility: PHYSICIAN GROUP | Age: 49
End: 2019-01-31

## 2019-01-31 ENCOUNTER — APPOINTMENT (RX ONLY)
Dept: URBAN - METROPOLITAN AREA CLINIC 20 | Facility: CLINIC | Age: 49
Setting detail: DERMATOLOGY
End: 2019-01-31

## 2019-01-31 DIAGNOSIS — E11.9 TYPE 2 DIABETES MELLITUS WITHOUT COMPLICATION, WITHOUT LONG-TERM CURRENT USE OF INSULIN (HCC): ICD-10-CM

## 2019-01-31 DIAGNOSIS — L81.1 CHLOASMA: ICD-10-CM

## 2019-01-31 PROCEDURE — ? ADDITIONAL NOTES

## 2019-01-31 PROCEDURE — 99212 OFFICE O/P EST SF 10 MIN: CPT

## 2019-01-31 PROCEDURE — ? COUNSELING

## 2019-01-31 ASSESSMENT — LOCATION ZONE DERM: LOCATION ZONE: FACE

## 2019-01-31 ASSESSMENT — LOCATION DETAILED DESCRIPTION DERM
LOCATION DETAILED: RIGHT MEDIAL FOREHEAD
LOCATION DETAILED: RIGHT LATERAL MALAR CHEEK
LOCATION DETAILED: LEFT CENTRAL MALAR CHEEK

## 2019-01-31 ASSESSMENT — LOCATION SIMPLE DESCRIPTION DERM
LOCATION SIMPLE: RIGHT FOREHEAD
LOCATION SIMPLE: LEFT CHEEK
LOCATION SIMPLE: RIGHT CHEEK

## 2019-01-31 NOTE — PROCEDURE: ADDITIONAL NOTES
Detail Level: Simple
Additional Notes: Pictures taken today, recheck in 3 months.
Additional Notes: Much improved.  Hold x three to four weeks and begin again qhs.

## 2019-02-04 ENCOUNTER — HOSPITAL ENCOUNTER (OUTPATIENT)
Dept: RADIOLOGY | Facility: MEDICAL CENTER | Age: 49
End: 2019-02-04
Attending: NURSE PRACTITIONER
Payer: COMMERCIAL

## 2019-02-04 DIAGNOSIS — R59.0 ADENOPATHY, CERVICAL: ICD-10-CM

## 2019-02-04 DIAGNOSIS — R22.1 LOCALIZED SWELLING, MASS OR LUMP OF NECK: ICD-10-CM

## 2019-02-04 PROCEDURE — 70491 CT SOFT TISSUE NECK W/DYE: CPT

## 2019-02-04 PROCEDURE — 700117 HCHG RX CONTRAST REV CODE 255: Performed by: NURSE PRACTITIONER

## 2019-02-04 RX ADMIN — IOHEXOL 100 ML: 350 INJECTION, SOLUTION INTRAVENOUS at 16:08

## 2019-02-06 DIAGNOSIS — E07.89 THYROID MASS OF UNCLEAR ETIOLOGY: ICD-10-CM

## 2019-02-07 ENCOUNTER — HOSPITAL ENCOUNTER (OUTPATIENT)
Dept: LAB | Facility: MEDICAL CENTER | Age: 49
End: 2019-02-07
Attending: NURSE PRACTITIONER
Payer: COMMERCIAL

## 2019-02-07 DIAGNOSIS — E11.9 TYPE 2 DIABETES MELLITUS WITHOUT COMPLICATION, WITHOUT LONG-TERM CURRENT USE OF INSULIN (HCC): ICD-10-CM

## 2019-02-07 LAB
ALBUMIN SERPL BCP-MCNC: 4.8 G/DL (ref 3.2–4.9)
ALBUMIN/GLOB SERPL: 1.5 G/DL
ALP SERPL-CCNC: 59 U/L (ref 30–99)
ALT SERPL-CCNC: 22 U/L (ref 2–50)
ANION GAP SERPL CALC-SCNC: 9 MMOL/L (ref 0–11.9)
AST SERPL-CCNC: 18 U/L (ref 12–45)
BILIRUB SERPL-MCNC: 1.2 MG/DL (ref 0.1–1.5)
BUN SERPL-MCNC: 15 MG/DL (ref 8–22)
CALCIUM SERPL-MCNC: 10.6 MG/DL (ref 8.5–10.5)
CHLORIDE SERPL-SCNC: 102 MMOL/L (ref 96–112)
CO2 SERPL-SCNC: 26 MMOL/L (ref 20–33)
CREAT SERPL-MCNC: 0.74 MG/DL (ref 0.5–1.4)
GLOBULIN SER CALC-MCNC: 3.3 G/DL (ref 1.9–3.5)
GLUCOSE SERPL-MCNC: 185 MG/DL (ref 65–99)
POTASSIUM SERPL-SCNC: 4.3 MMOL/L (ref 3.6–5.5)
PROT SERPL-MCNC: 8.1 G/DL (ref 6–8.2)
SODIUM SERPL-SCNC: 137 MMOL/L (ref 135–145)

## 2019-02-07 PROCEDURE — 36415 COLL VENOUS BLD VENIPUNCTURE: CPT

## 2019-02-07 PROCEDURE — 80053 COMPREHEN METABOLIC PANEL: CPT

## 2019-02-08 ENCOUNTER — PATIENT MESSAGE (OUTPATIENT)
Dept: MEDICAL GROUP | Facility: PHYSICIAN GROUP | Age: 49
End: 2019-02-08

## 2019-02-08 DIAGNOSIS — E11.9 TYPE 2 DIABETES MELLITUS WITHOUT COMPLICATION, WITHOUT LONG-TERM CURRENT USE OF INSULIN (HCC): ICD-10-CM

## 2019-02-14 ENCOUNTER — PATIENT MESSAGE (OUTPATIENT)
Dept: MEDICAL GROUP | Facility: PHYSICIAN GROUP | Age: 49
End: 2019-02-14

## 2019-02-14 DIAGNOSIS — R59.9 ADENOPATHY: ICD-10-CM

## 2019-02-14 DIAGNOSIS — E04.1 THYROID NODULE: ICD-10-CM

## 2019-02-15 NOTE — TELEPHONE ENCOUNTER
From: Dora Sheldon  To: CORRY Bae  Sent: 2/14/2019 2:17 PM PST  Subject: Test Result Question    Lee   I haven't heard from the surgeon yet. However I have a 2 week trip planned starting March 25th and I need to know if I should cancel this trip. So I think we need to do the needle biopsy asap so we can figure out what we are actually dealing with. So can you please schedule that asap.     Dora Sheldon      ----- Message -----  From: CORRY Bae  Sent: 2/11/2019 8:32 PM PST  To: Dora Sheldon  Subject: RE: Test Result Question  Orlando John,     Yes you may restart your metformin. Your calcium was only 0.1 high. All others have been normal. We will recheck this in May with your A1C (non-fasting labs were ordered). I referred you to a doctor Monica Farah who essentially took over the position of Dr. Louis. If he interested her with his position, that must mean she is good and I have heard great things about her from multiple people who have had experience with her over this past year. You will be in good hands on whether she feels it is necessary to do a procedure for these lumps or not. Depending on how soon you get in to see her, we could always do a fine-needle biopsy in the meantime like I had previously discussed with you as an option when we first found these lumps around the thyroid.    CORRY Bae    ----- Message -----   From: Dora Sheldon   Sent: 2/8/2019 8:30 AM PST   To: CORRY Bae  Subject: Test Result Question    Good morning! So should I start to take my Metformin again? I am wondering when I might hear from the surgeon. Can you tell me what group you have sent my referral to? Also I noticed my calcium is high? Is there anything I should be concerned about there?   Dora Sheldon

## 2019-02-21 ENCOUNTER — HOSPITAL ENCOUNTER (OUTPATIENT)
Dept: RADIOLOGY | Facility: MEDICAL CENTER | Age: 49
End: 2019-02-21
Attending: NURSE PRACTITIONER
Payer: COMMERCIAL

## 2019-02-21 DIAGNOSIS — E04.1 THYROID NODULE: ICD-10-CM

## 2019-02-21 DIAGNOSIS — R59.9 ADENOPATHY: ICD-10-CM

## 2019-02-21 LAB — CYTOLOGY REG CYTOL: NORMAL

## 2019-02-21 PROCEDURE — 88112 CYTOPATH CELL ENHANCE TECH: CPT

## 2019-02-21 PROCEDURE — 10005 FNA BX W/US GDN 1ST LES: CPT

## 2019-02-21 PROCEDURE — 700101 HCHG RX REV CODE 250

## 2019-02-21 RX ORDER — LIDOCAINE HYDROCHLORIDE 10 MG/ML
INJECTION, SOLUTION INFILTRATION; PERINEURAL
Status: COMPLETED
Start: 2019-02-21 | End: 2019-02-21

## 2019-02-21 RX ADMIN — LIDOCAINE HYDROCHLORIDE: 10 INJECTION, SOLUTION INFILTRATION; PERINEURAL at 13:30

## 2019-02-21 NOTE — PROGRESS NOTES
"Patient given [facility] \"Preventing the Spread of Infection\" Brochure upon being checked in.     US guided mid thyroid/isthmus nodule fine needle aspiration done by Dr. Atkins; NON-SEDATION  (no H&P required as this is a NON SEDATION procedure); mid anterior aspect of neck access site; procedural RN: Vivi; time out completed by all staff; 1 jar of cytolyt obtained and sent to pathology lab; pt tolerated the procedure well; pt remained stable pre/intra/post procedure; all questions and concerns answered prior to being d/c; patient provided with appropriate education for procedure; pt d/c home.     "

## 2019-03-16 ENCOUNTER — OFFICE VISIT (OUTPATIENT)
Dept: URGENT CARE | Facility: PHYSICIAN GROUP | Age: 49
End: 2019-03-16
Payer: COMMERCIAL

## 2019-03-16 ENCOUNTER — HOSPITAL ENCOUNTER (OUTPATIENT)
Dept: RADIOLOGY | Facility: MEDICAL CENTER | Age: 49
End: 2019-03-16
Attending: PHYSICIAN ASSISTANT
Payer: COMMERCIAL

## 2019-03-16 VITALS
TEMPERATURE: 97.8 F | DIASTOLIC BLOOD PRESSURE: 80 MMHG | BODY MASS INDEX: 32.78 KG/M2 | HEART RATE: 88 BPM | RESPIRATION RATE: 14 BRPM | WEIGHT: 185 LBS | OXYGEN SATURATION: 98 % | SYSTOLIC BLOOD PRESSURE: 130 MMHG | HEIGHT: 63 IN

## 2019-03-16 DIAGNOSIS — M79.671 RIGHT FOOT PAIN: ICD-10-CM

## 2019-03-16 DIAGNOSIS — S96.911A STRAIN OF RIGHT FOOT, INITIAL ENCOUNTER: ICD-10-CM

## 2019-03-16 PROCEDURE — 99214 OFFICE O/P EST MOD 30 MIN: CPT | Performed by: PHYSICIAN ASSISTANT

## 2019-03-16 PROCEDURE — 73630 X-RAY EXAM OF FOOT: CPT | Mod: RT

## 2019-03-16 RX ORDER — LISINOPRIL 20 MG/1
20 TABLET ORAL DAILY
COMMUNITY
End: 2019-05-22 | Stop reason: SDUPTHER

## 2019-03-16 RX ORDER — NAPROXEN 500 MG/1
500 TABLET ORAL 2 TIMES DAILY WITH MEALS
Qty: 30 TAB | Refills: 0 | Status: SHIPPED | OUTPATIENT
Start: 2019-03-16 | End: 2019-07-25

## 2019-03-16 ASSESSMENT — ENCOUNTER SYMPTOMS
CHILLS: 0
NAUSEA: 0
FALLS: 0
FEVER: 0
MYALGIAS: 1
VOMITING: 0

## 2019-03-16 NOTE — PROGRESS NOTES
"Subjective:   Dora Sheldon is a 48 y.o. female who presents for Foot Pain (r foot pain started wensday )        Pt complains of right foot pain x 3 days.  Pain is aching and radiates up leg. Pain began after wearing boots with a 2\" heel.  Saw PT and received massage and tape- this improved symptoms. Symptoms nearly fully resolved and then recurred after wearing boots again.  Admits to mild difficulty ambulating.  Denies trauma or prior injury.    Hx of thyroid nodules, Recent FNB.  Denies history of gout.      Review of Systems   Constitutional: Negative for chills and fever.   Gastrointestinal: Negative for nausea and vomiting.   Musculoskeletal: Positive for myalgias. Negative for falls.       PMH:  has a past medical history of Anxiety; Diabetes mellitus, type 2 (HCC); Dyslipidemia; HTN (hypertension); and Sleep apnea.  MEDS:   Current Outpatient Prescriptions:   •  lisinopril (PRINIVIL) 20 MG Tab, Take 20 mg by mouth every day., Disp: , Rfl:   •  naproxen (NAPROSYN) 500 MG Tab, Take 1 Tab by mouth 2 times a day, with meals., Disp: 30 Tab, Rfl: 0  •  Blood Glucose Monitoring Suppl Device, Meter: Dispense Device of Insurance Preference. Sig. Use as directed for blood sugar monitoring. #1. NR., Disp: 1 Device, Rfl: 0  •  metFORMIN (GLUCOPHAGE) 500 MG Tab, Take 1 Tab by mouth 2 times a day, with meals., Disp: 180 Tab, Rfl: 3  •  atorvastatin (LIPITOR) 10 MG Tab, Take 1 Tab by mouth every day., Disp: 90 Tab, Rfl: 3  •  losartan-hydrochlorothiazide (HYZAAR) 50-12.5 MG per tablet, Take 1 Tab by mouth every day., Disp: 90 Tab, Rfl: 3  •  Blood Glucose Monitoring Suppl SUPPLIES Misc, Test strips order: Test strips for One Touch Ultra 2 meter. Sig: use twice daily and prn ssx high or low sugar #100 RF x 3, Disp: 100 Each, Rfl: 3  •  Lancets Misc, 1 Each by Does not apply route 3 times a day., Disp: 100 Each, Rfl: 2  •  levonorgestrel (MIRENA) 20 MCG/24HR IUD, 1 Each by Intrauterine route Once., Disp: , Rfl: " "  ALLERGIES: No Known Allergies  SURGHX:   Past Surgical History:   Procedure Laterality Date   • HYSTEROSCOPY NOVASURE-2  2010    Dr. Sandoval   • ABDOMINOPLASTY  1999    Dr. Ricci   • TONSILLECTOMY       SOCHX:  reports that she has never smoked. She has never used smokeless tobacco. She reports that she drinks about 4.2 - 8.4 oz of alcohol per week . She reports that she does not use drugs.  FH: Family history was reviewed, no pertinent findings to report   Objective:   /80   Pulse 88   Temp 36.6 °C (97.8 °F) (Temporal)   Resp 14   Ht 1.6 m (5' 3\")   Wt 83.9 kg (185 lb)   SpO2 98%   BMI 32.77 kg/m²   Physical Exam   Constitutional: She appears well-developed and well-nourished.  Non-toxic appearance. No distress.   HENT:   Head: Normocephalic and atraumatic.   Right Ear: External ear normal.   Left Ear: External ear normal.   Nose: Nose normal.   Neck: Neck supple.   Pulmonary/Chest: Effort normal. No respiratory distress.   Musculoskeletal:        Right foot: There is tenderness. There is normal range of motion, no bony tenderness, no swelling, normal capillary refill, no crepitus and no deformity.        Feet:    Neurological: She is alert.   Skin: Skin is warm and dry. Capillary refill takes less than 2 seconds.   Psychiatric: She has a normal mood and affect. Her speech is normal and behavior is normal. Judgment and thought content normal. Cognition and memory are normal.   Vitals reviewed.        Assessment/Plan:   1. Right foot pain  - DX-FOOT-COMPLETE 3+ RIGHT; Future  - naproxen (NAPROSYN) 500 MG Tab; Take 1 Tab by mouth 2 times a day, with meals.  Dispense: 30 Tab; Refill: 0    2. Strain of right foot, initial encounter  - naproxen (NAPROSYN) 500 MG Tab; Take 1 Tab by mouth 2 times a day, with meals.  Dispense: 30 Tab; Refill: 0    Other orders  - lisinopril (PRINIVIL) 20 MG Tab; Take 20 mg by mouth every day.    XR: No fracture or dislocation by my read.   Radiology " review:  FINDINGS:  There is no fracture.  Alignment is normal.  No degenerative changes are present.    Patient instructed to take Naprosyn as directed for the next 3-5 days.  Elevate and ice regularly.  Avoid prolonged standing and walking.  Patient also advised that she should wear firm soled shoes.  If symptoms worsen or fail to fully resolve she was instructed to follow-up with her PCP for reevaluation.    Differential diagnosis, natural history, supportive care, and indications for immediate follow-up discussed.

## 2019-05-22 ENCOUNTER — HOSPITAL ENCOUNTER (OUTPATIENT)
Dept: LAB | Facility: MEDICAL CENTER | Age: 49
End: 2019-05-22
Attending: INTERNAL MEDICINE
Payer: COMMERCIAL

## 2019-05-22 ENCOUNTER — OFFICE VISIT (OUTPATIENT)
Dept: MEDICAL GROUP | Facility: PHYSICIAN GROUP | Age: 49
End: 2019-05-22
Payer: COMMERCIAL

## 2019-05-22 VITALS
SYSTOLIC BLOOD PRESSURE: 128 MMHG | HEIGHT: 63 IN | HEART RATE: 67 BPM | TEMPERATURE: 98.6 F | WEIGHT: 180 LBS | RESPIRATION RATE: 16 BRPM | DIASTOLIC BLOOD PRESSURE: 70 MMHG | BODY MASS INDEX: 31.89 KG/M2 | OXYGEN SATURATION: 99 %

## 2019-05-22 DIAGNOSIS — E78.5 DYSLIPIDEMIA: ICD-10-CM

## 2019-05-22 DIAGNOSIS — E11.9 TYPE 2 DIABETES MELLITUS WITHOUT COMPLICATION, WITHOUT LONG-TERM CURRENT USE OF INSULIN (HCC): ICD-10-CM

## 2019-05-22 DIAGNOSIS — I10 ESSENTIAL HYPERTENSION: ICD-10-CM

## 2019-05-22 DIAGNOSIS — E66.9 CLASS 1 OBESITY WITH SERIOUS COMORBIDITY AND BODY MASS INDEX (BMI) OF 31.0 TO 31.9 IN ADULT, UNSPECIFIED OBESITY TYPE: ICD-10-CM

## 2019-05-22 LAB
ALBUMIN SERPL BCP-MCNC: 4.8 G/DL (ref 3.2–4.9)
ALP SERPL-CCNC: 65 U/L (ref 30–99)
ALT SERPL-CCNC: 27 U/L (ref 2–50)
AST SERPL-CCNC: 17 U/L (ref 12–45)
BILIRUB CONJ SERPL-MCNC: 0.2 MG/DL (ref 0.1–0.5)
BILIRUB INDIRECT SERPL-MCNC: 0.8 MG/DL (ref 0–1)
BILIRUB SERPL-MCNC: 1 MG/DL (ref 0.1–1.5)
HBA1C MFR BLD: 7.5 % (ref 0–5.6)
INT CON NEG: NEGATIVE
INT CON POS: POSITIVE
PROT SERPL-MCNC: 7.8 G/DL (ref 6–8.2)

## 2019-05-22 PROCEDURE — 36415 COLL VENOUS BLD VENIPUNCTURE: CPT

## 2019-05-22 PROCEDURE — 99214 OFFICE O/P EST MOD 30 MIN: CPT | Performed by: FAMILY MEDICINE

## 2019-05-22 PROCEDURE — 80076 HEPATIC FUNCTION PANEL: CPT

## 2019-05-22 PROCEDURE — 83036 HEMOGLOBIN GLYCOSYLATED A1C: CPT | Performed by: FAMILY MEDICINE

## 2019-05-22 RX ORDER — ATORVASTATIN CALCIUM 10 MG/1
10 TABLET, FILM COATED ORAL
Qty: 90 TAB | Refills: 3 | Status: SHIPPED | OUTPATIENT
Start: 2019-05-22 | End: 2019-07-25 | Stop reason: SDUPTHER

## 2019-05-22 RX ORDER — LISINOPRIL 20 MG/1
20 TABLET ORAL DAILY
Qty: 90 TAB | Refills: 3 | Status: SHIPPED | OUTPATIENT
Start: 2019-05-22 | End: 2019-07-19

## 2019-05-22 ASSESSMENT — PATIENT HEALTH QUESTIONNAIRE - PHQ9: CLINICAL INTERPRETATION OF PHQ2 SCORE: 0

## 2019-05-22 NOTE — ASSESSMENT & PLAN NOTE
Chronic ongoing medical condition.  Currently taking 500 mg twice daily metformin.  Most recent A1c 7.6%.  Patient denies any lightheadedness, dizziness, chest pain, shortness of breath.

## 2019-05-22 NOTE — ASSESSMENT & PLAN NOTE
Chronic ongoing medical condition.  Currently on atorvastatin 10 mg daily.  No history of MI or stroke.

## 2019-05-22 NOTE — PROGRESS NOTES
CC:Diagnoses of Type 2 diabetes mellitus without complication, without long-term current use of insulin (HCC), Essential hypertension, Dyslipidemia, and Class 1 obesity with serious comorbidity and body mass index (BMI) of 31.0 to 31.9 in adult, unspecified obesity type were pertinent to this visit.      HISTORY OF PRESENT ILLNESS: Patient is a 48 y.o. female established patient who presents today to establish new PCP.    Diabetes mellitus, type 2  Chronic ongoing medical condition.  Currently taking 500 mg twice daily metformin.  Most recent A1c 7.6%.  Patient denies any lightheadedness, dizziness, chest pain, shortness of breath.    Dyslipidemia  Chronic ongoing medical condition.  Currently on atorvastatin 10 mg daily.  No history of MI or stroke.    Essential hypertension  Chronic ongoing medical condition.  Currently well controlled with lisinopril 20 mg daily.    Obesity  Chronic ongoing medical condition.  Counseled on diet, exercise, and lifestyle changes that will help blood sugars and help her lose weight.  Patient education material provided today.      Patient Active Problem List    Diagnosis Date Noted   • Obesity 05/22/2019   • Dry cough 12/12/2018   • Multiple thyroid nodules 07/20/2018   • Fatty liver 06/22/2018   • Chronic dryness of both eyes 12/27/2016   • Vitamin D deficiency 09/22/2016   • TED on CPAP 03/23/2016   • Hyperactivity (behavior)    • Microalbuminuria 06/11/2014   • Diabetes mellitus, type 2 (HCC)    • Essential hypertension    • Dyslipidemia       Allergies:Patient has no known allergies.    Current Outpatient Prescriptions   Medication Sig Dispense Refill   • Cholecalciferol (VITAMIN D PO) Take 5,000 Caps by mouth.     • lisinopril (PRINIVIL) 20 MG Tab Take 1 Tab by mouth every day. 90 Tab 3   • atorvastatin (LIPITOR) 10 MG Tab Take 1 Tab by mouth every day. 90 Tab 3   • metFORMIN (GLUCOPHAGE) 1000 MG tablet Take 1 Tab by mouth 2 times a day, with meals. 90 Tab 3   • naproxen  "(NAPROSYN) 500 MG Tab Take 1 Tab by mouth 2 times a day, with meals. 30 Tab 0   • Blood Glucose Monitoring Suppl Device Meter: Dispense Device of Insurance Preference. Sig. Use as directed for blood sugar monitoring. #1. NR. 1 Device 0   • Blood Glucose Monitoring Suppl SUPPLIES Misc Test strips order: Test strips for One Touch Ultra 2 meter. Sig: use twice daily and prn ssx high or low sugar #100 RF x 3 100 Each 3   • Lancets Misc 1 Each by Does not apply route 3 times a day. 100 Each 2   • levonorgestrel (MIRENA) 20 MCG/24HR IUD 1 Each by Intrauterine route Once.       No current facility-administered medications for this visit.        Social History   Substance Use Topics   • Smoking status: Never Smoker   • Smokeless tobacco: Never Used   • Alcohol use 4.2 - 8.4 oz/week     7 - 14 Glasses of wine per week     Social History     Social History Narrative   • No narrative on file       Family History   Problem Relation Age of Onset   • Hypertension Mother    • Hypertension Father    • Heart Attack Father    • Psychiatry Paternal Uncle    • Heart Disease Maternal Grandmother    • Hypertension Maternal Grandmother    • Heart Disease Maternal Grandfather    • Hypertension Maternal Grandfather    • Diabetes Maternal Uncle    • Sleep Apnea Neg Hx        Review of Systems:    Eyes: No vision changes  ENT: No hearing changes  Resp: No Shortness of breath  CV: No Chest pain      Exam:    /70 (BP Location: Left arm, Patient Position: Sitting, BP Cuff Size: Adult)   Pulse 67   Temp 37 °C (98.6 °F) (Temporal)   Resp 16   Ht 1.6 m (5' 3\")   Wt 81.6 kg (180 lb)   SpO2 99%  Body mass index is 31.89 kg/m².    General:  Well nourished, well developed female in NAD, obese  HENT: Atraumatic, normocephalic  EYES: Extraocular movements intact, pupils equal and reactive to light  NECK: Supple, FROM  CHEST: No deformities, Equal chest expansion  RESP: Unlabored, no stridor or audible wheeze  ABD: Soft, Nontender, " Non-Distended  Extremities: No Clubbing, Cyanosis, or Edema  Skin: Warm/dry, without rashes  Neuro: A/O x 4, CN 2-12 Grossly intact, Motor/sensory grossly intact  Psych: Normal behavior, normal affect      Assessment/Plan:  1. Type 2 diabetes mellitus without complication, without long-term current use of insulin (HCC)  Chronic ongoing medical condition.  A1c 7.5% today.  Increasing metformin to 1000 mg twice daily.  Follow-up in 2 months.  - POCT  A1C  - metFORMIN (GLUCOPHAGE) 1000 MG tablet; Take 1 Tab by mouth 2 times a day, with meals.  Dispense: 180 Tab; Refill: 3    2. Essential hypertension  Chronic ongoing medical condition.  Continue lisinopril 20 mg daily.  No change indicated at this time.  - lisinopril (PRINIVIL) 20 MG Tab; Take 1 Tab by mouth every day.  Dispense: 90 Tab; Refill: 3    3. Dyslipidemia  Chronic ongoing medical condition.  Atorvastatin as below.  No changes indicated at this time.  - atorvastatin (LIPITOR) 10 MG Tab; Take 1 Tab by mouth every day.  Dispense: 90 Tab; Refill: 3    4. Class 1 obesity with serious comorbidity and body mass index (BMI) of 31.0 to 31.9 in adult, unspecified obesity type  Chronic ongoing medical condition.  Counseled on diet, exercise, and lifestyle changes to help with blood sugars and help her lose weight.  Patient educated and education materials provided today.    Please note that this dictation was created using voice recognition software. I have worked with consultants from the vendor as well as technical experts from [employer] to optimize the interface. I have made every reasonable attempt to correct obvious errors, but I expect that there are errors of grammar and possibly content that I did not discover before finalizing the note.

## 2019-05-22 NOTE — ASSESSMENT & PLAN NOTE
Chronic ongoing medical condition.  Counseled on diet, exercise, and lifestyle changes that will help blood sugars and help her lose weight.  Patient education material provided today.

## 2019-06-05 ENCOUNTER — APPOINTMENT (RX ONLY)
Dept: URBAN - METROPOLITAN AREA CLINIC 20 | Facility: CLINIC | Age: 49
Setting detail: DERMATOLOGY
End: 2019-06-05

## 2019-06-05 DIAGNOSIS — L60.3 NAIL DYSTROPHY: ICD-10-CM

## 2019-06-05 DIAGNOSIS — L81.1 CHLOASMA: ICD-10-CM

## 2019-06-05 PROCEDURE — ? PRESCRIPTION

## 2019-06-05 PROCEDURE — ? PHOTO-DOCUMENTATION

## 2019-06-05 PROCEDURE — ? COUNSELING

## 2019-06-05 PROCEDURE — 99213 OFFICE O/P EST LOW 20 MIN: CPT

## 2019-06-05 RX ORDER — FLUOCINOLONE ACETONIDE, HYDROQUINONE, AND TRETINOIN .1; 40; .5 MG/G; MG/G; MG/G
CREAM TOPICAL QHS
Qty: 1 | Refills: 1 | Status: ERX

## 2019-06-05 RX ORDER — TRETINOIN 0.25 MG/G
CREAM TOPICAL QHS
Qty: 1 | Refills: 3 | Status: CANCELLED
Stop reason: CLARIF

## 2019-06-05 ASSESSMENT — LOCATION ZONE DERM
LOCATION ZONE: TOENAIL
LOCATION ZONE: FACE

## 2019-06-05 ASSESSMENT — LOCATION SIMPLE DESCRIPTION DERM
LOCATION SIMPLE: RIGHT CHEEK
LOCATION SIMPLE: LEFT GREAT TOE
LOCATION SIMPLE: RIGHT FOREHEAD
LOCATION SIMPLE: LEFT CHEEK

## 2019-06-05 ASSESSMENT — LOCATION DETAILED DESCRIPTION DERM
LOCATION DETAILED: LEFT GREAT TOENAIL
LOCATION DETAILED: RIGHT LATERAL MALAR CHEEK
LOCATION DETAILED: RIGHT MEDIAL FOREHEAD
LOCATION DETAILED: LEFT CENTRAL MALAR CHEEK

## 2019-06-05 NOTE — PROCEDURE: COUNSELING
Detail Level: Simple
Patient Specific Counseling (Will Not Stick From Patient To Patient): Discussed laser treatment and given Carli Alma card\\nWill plan on retin a when off Hydroquinone

## 2019-06-05 NOTE — HPI: NAIL DISCOLORATION (MELANONYCHIA)
Is This A New Presentation, Or A Follow-Up?: Nail Discoloration
How Severe Is It?: moderate
Additional History: patient had a dark line on her toe nail for two months, but it’s not currently present today.\\n

## 2019-06-05 NOTE — HPI: DISCOLORATION (MELASMA)
How Severe Are They?: moderate
Is This A New Presentation, Or A Follow-Up?: Follow Up Melasma
Additional History: patient would like a refill on [facility].

## 2019-07-25 ENCOUNTER — OFFICE VISIT (OUTPATIENT)
Dept: MEDICAL GROUP | Facility: PHYSICIAN GROUP | Age: 49
End: 2019-07-25
Payer: COMMERCIAL

## 2019-07-25 VITALS
SYSTOLIC BLOOD PRESSURE: 124 MMHG | TEMPERATURE: 98.8 F | OXYGEN SATURATION: 97 % | BODY MASS INDEX: 32.25 KG/M2 | WEIGHT: 182 LBS | DIASTOLIC BLOOD PRESSURE: 72 MMHG | HEIGHT: 63 IN | HEART RATE: 67 BPM

## 2019-07-25 DIAGNOSIS — I10 ESSENTIAL HYPERTENSION: ICD-10-CM

## 2019-07-25 DIAGNOSIS — E11.9 TYPE 2 DIABETES MELLITUS WITHOUT COMPLICATION, WITHOUT LONG-TERM CURRENT USE OF INSULIN (HCC): ICD-10-CM

## 2019-07-25 DIAGNOSIS — E78.5 DYSLIPIDEMIA: ICD-10-CM

## 2019-07-25 DIAGNOSIS — Z12.39 SCREENING FOR BREAST CANCER: ICD-10-CM

## 2019-07-25 DIAGNOSIS — E66.9 CLASS 1 OBESITY WITH SERIOUS COMORBIDITY AND BODY MASS INDEX (BMI) OF 31.0 TO 31.9 IN ADULT, UNSPECIFIED OBESITY TYPE: ICD-10-CM

## 2019-07-25 PROCEDURE — 99214 OFFICE O/P EST MOD 30 MIN: CPT | Performed by: FAMILY MEDICINE

## 2019-07-25 RX ORDER — LISINOPRIL AND HYDROCHLOROTHIAZIDE 25; 20 MG/1; MG/1
1 TABLET ORAL
Qty: 90 TAB | Refills: 3 | Status: SHIPPED | OUTPATIENT
Start: 2019-07-25 | End: 2020-07-31

## 2019-07-25 RX ORDER — ATORVASTATIN CALCIUM 10 MG/1
10 TABLET, FILM COATED ORAL
Qty: 90 TAB | Refills: 3 | Status: SHIPPED | OUTPATIENT
Start: 2019-07-25 | End: 2020-07-31

## 2019-07-25 ASSESSMENT — PAIN SCALES - GENERAL: PAINLEVEL: NO PAIN

## 2019-07-25 NOTE — PROGRESS NOTES
CC:Diagnoses of Type 2 diabetes mellitus without complication, without long-term current use of insulin (HCC), Screening for breast cancer, Dyslipidemia, Essential hypertension, and Class 1 obesity with serious comorbidity and body mass index (BMI) of 31.0 to 31.9 in adult, unspecified obesity type were pertinent to this visit.      HISTORY OF PRESENT ILLNESS: Patient is a 48 y.o. female established patient who presents today to follow-up on diabetes.    Diabetes mellitus, type 2  Chronic ongoing medical condition.  Patient currently taking 500 mg metformin twice daily and has tried to increase her metformin to 2000 mg a day however she struggles with diarrhea and stomach upset at higher doses.  Counseled on starting secondary medicine and patient is amenable.    Dyslipidemia  Chronic ongoing medical condition.  Currently taking atorvastatin 10 mg daily without any side effects per    Essential hypertension  Chronic ongoing medical condition.  Currently well controlled with lisinopril/hydrochlorothiazide 20/25 mg once daily.  Blood pressure today 124/72.    Obesity  Chronic ongoing medical condition.  Counseled on weight loss.  Counseled on how Jardiance may help with her weight loss as well as diabetic control.      Patient Active Problem List    Diagnosis Date Noted   • Obesity 05/22/2019   • Dry cough 12/12/2018   • Multiple thyroid nodules 07/20/2018   • Fatty liver 06/22/2018   • Chronic dryness of both eyes 12/27/2016   • Vitamin D deficiency 09/22/2016   • TED on CPAP 03/23/2016   • Hyperactivity (behavior)    • Microalbuminuria 06/11/2014   • Diabetes mellitus, type 2 (HCC)    • Essential hypertension    • Dyslipidemia       Allergies:Patient has no known allergies.    Current Outpatient Prescriptions   Medication Sig Dispense Refill   • Empagliflozin 10 MG Tab Take 10 mg by mouth every day. 30 Tab 11   • lisinopril-hydrochlorothiazide (PRINZIDE, ZESTORETIC) 20-25 MG per tablet Take 1 Tab by mouth every day.  "90 Tab 3   • atorvastatin (LIPITOR) 10 MG Tab Take 1 Tab by mouth every day. 90 Tab 3   • Cholecalciferol (VITAMIN D PO) Take 5,000 Caps by mouth.     • metformin (GLUCOPHAGE) 1000 MG tablet Take 1 Tab by mouth 2 times a day, with meals. 180 Tab 3   • Blood Glucose Monitoring Suppl Device Meter: Dispense Device of Insurance Preference. Sig. Use as directed for blood sugar monitoring. #1. NR. 1 Device 0   • Blood Glucose Monitoring Suppl SUPPLIES Misc Test strips order: Test strips for One Touch Ultra 2 meter. Sig: use twice daily and prn ssx high or low sugar #100 RF x 3 100 Each 3   • Lancets Misc 1 Each by Does not apply route 3 times a day. 100 Each 2   • levonorgestrel (MIRENA) 20 MCG/24HR IUD 1 Each by Intrauterine route Once.       No current facility-administered medications for this visit.        Social History   Substance Use Topics   • Smoking status: Never Smoker   • Smokeless tobacco: Never Used   • Alcohol use 4.2 - 8.4 oz/week     7 - 14 Glasses of wine per week     Social History     Social History Narrative   • No narrative on file       Family History   Problem Relation Age of Onset   • Hypertension Mother    • Hypertension Father    • Heart Attack Father    • Psychiatry Paternal Uncle    • Heart Disease Maternal Grandmother    • Hypertension Maternal Grandmother    • Heart Disease Maternal Grandfather    • Hypertension Maternal Grandfather    • Diabetes Maternal Uncle    • Sleep Apnea Neg Hx        Review of Systems:    Constitutional: No Fevers, Chills  Eyes: No vision changes  ENT: No hearing changes  Resp: No Shortness of breath      Exam:    /72 (BP Location: Right arm, Patient Position: Sitting, BP Cuff Size: Adult)   Pulse 67   Temp 37.1 °C (98.8 °F)   Ht 1.6 m (5' 3\")   Wt 82.6 kg (182 lb)   SpO2 97%  Body mass index is 32.24 kg/m².    General:  Well nourished, well developed female in NAD, overweight  HENT: Atraumatic, normocephalic  EYES: Extraocular movements intact, pupils " equal and reactive to light  NECK: Supple, FROM  CHEST: No deformities, Equal chest expansion  RESP: Unlabored, no stridor or audible wheeze  ABD: Soft, Nontender, Non-Distended  Extremities: No Clubbing, Cyanosis, or Edema  Skin: Warm/dry, without rashes  Neuro: A/O x 4, CN 2-12 Grossly intact, Motor/sensory grossly intact  Psych: Normal behavior, normal affect      Assessment/Plan:  1. Type 2 diabetes mellitus without complication, without long-term current use of insulin (HCC)  Chronic ongoing medical condition.  Continue metformin 1000 mg daily.  Start empagliflozin 10 mg daily.  Follow-up in 1 month.  - Empagliflozin 10 MG Tab; Take 10 mg by mouth every day.  Dispense: 30 Tab; Refill: 11  - Diabetic Monofilament LE Exam  - POCT Retinal Eye Exam    2. Screening for breast cancer  - MA-SCREENING MAMMO BILAT W/TOMOSYNTHESIS W/CAD; Future    3. Dyslipidemia  Chronic ongoing medical condition.  Continue atorvastatin 10 mg daily.  Refill medication sent to pharmacy today.  - atorvastatin (LIPITOR) 10 MG Tab; Take 1 Tab by mouth every day.  Dispense: 90 Tab; Refill: 3    4. Essential hypertension  Chronic ongoing medical condition.  Continue lisinopril/hydrochlorothiazide.  Refill sent to pharmacy today.  - lisinopril-hydrochlorothiazide (PRINZIDE, ZESTORETIC) 20-25 MG per tablet; Take 1 Tab by mouth every day.  Dispense: 90 Tab; Refill: 3    5. Class 1 obesity with serious comorbidity and body mass index (BMI) of 31.0 to 31.9 in adult, unspecified obesity type  Chronic ongoing medical condition.  Counseled on diet, exercise, and weight loss.  Starting Jardiance      Follow-up in 1 month for diabetes and medication management.    Please note that this dictation was created using voice recognition software. I have worked with consultants from the vendor as well as technical experts from [employer] to optimize the interface. I have made every reasonable attempt to correct obvious errors, but I expect that there are  errors of grammar and possibly content that I did not discover before finalizing the note.

## 2019-07-25 NOTE — ASSESSMENT & PLAN NOTE
Chronic ongoing medical condition.  Currently taking atorvastatin 10 mg daily without any side effects per

## 2019-07-25 NOTE — ASSESSMENT & PLAN NOTE
Chronic ongoing medical condition.  Patient currently taking 500 mg metformin twice daily and has tried to increase her metformin to 2000 mg a day however she struggles with diarrhea and stomach upset at higher doses.  Counseled on starting secondary medicine and patient is amenable.

## 2019-07-25 NOTE — ASSESSMENT & PLAN NOTE
Chronic ongoing medical condition.  Counseled on weight loss.  Counseled on how Jardiance may help with her weight loss as well as diabetic control.

## 2019-07-25 NOTE — ASSESSMENT & PLAN NOTE
Chronic ongoing medical condition.  Currently well controlled with lisinopril/hydrochlorothiazide 20/25 mg once daily.  Blood pressure today 124/72.

## 2019-09-04 ENCOUNTER — OFFICE VISIT (OUTPATIENT)
Dept: MEDICAL GROUP | Facility: PHYSICIAN GROUP | Age: 49
End: 2019-09-04
Payer: COMMERCIAL

## 2019-09-04 VITALS
DIASTOLIC BLOOD PRESSURE: 70 MMHG | HEIGHT: 63 IN | OXYGEN SATURATION: 97 % | TEMPERATURE: 98.4 F | BODY MASS INDEX: 32.07 KG/M2 | HEART RATE: 74 BPM | SYSTOLIC BLOOD PRESSURE: 118 MMHG | WEIGHT: 181 LBS

## 2019-09-04 DIAGNOSIS — E66.9 CLASS 1 OBESITY WITH SERIOUS COMORBIDITY AND BODY MASS INDEX (BMI) OF 31.0 TO 31.9 IN ADULT, UNSPECIFIED OBESITY TYPE: ICD-10-CM

## 2019-09-04 DIAGNOSIS — E11.9 TYPE 2 DIABETES MELLITUS WITHOUT COMPLICATION, WITHOUT LONG-TERM CURRENT USE OF INSULIN (HCC): ICD-10-CM

## 2019-09-04 DIAGNOSIS — G47.33 OSA ON CPAP: ICD-10-CM

## 2019-09-04 PROCEDURE — 99214 OFFICE O/P EST MOD 30 MIN: CPT | Performed by: FAMILY MEDICINE

## 2019-09-04 ASSESSMENT — PAIN SCALES - GENERAL: PAINLEVEL: NO PAIN

## 2019-09-04 NOTE — ASSESSMENT & PLAN NOTE
Chronic ongoing medical condition.  Patient currently taking metformin 500 mg twice daily and unable to increase this dose due to diarrhea.  Patient was started on empagliflozin 10 mg and has been doing well.  A1c today 7.1%, down from 7.5% at last check.  Patient actively making dietary changes as well.

## 2019-09-04 NOTE — ASSESSMENT & PLAN NOTE
Chronic ongoing medical condition.  Currently following with sleep medicine and ENT.  States compliance with CPAP.

## 2019-09-04 NOTE — PROGRESS NOTES
CC:Diagnoses of Type 2 diabetes mellitus without complication, without long-term current use of insulin (HCC), Class 1 obesity with serious comorbidity and body mass index (BMI) of 31.0 to 31.9 in adult, unspecified obesity type, and TED on CPAP were pertinent to this visit.      HISTORY OF PRESENT ILLNESS: Patient is a 49 y.o. female established patient who presents today to follow-up on diabetes, weight.      Diabetes mellitus, type 2  Chronic ongoing medical condition.  Patient currently taking metformin 500 mg twice daily and unable to increase this dose due to diarrhea.  Patient was started on empagliflozin 10 mg and has been doing well.  A1c today 7.1%, down from 7.5% at last check.  Patient actively making dietary changes as well.    Obesity  Chronic ongoing medical condition.  Patient's weight is stable however she is actively making dietary changes and finding that she feels better and her blood sugars on average are better.    TED on CPAP  Chronic ongoing medical condition.  Currently following with sleep medicine and ENT.  States compliance with CPAP.      Patient Active Problem List    Diagnosis Date Noted   • Obesity 05/22/2019   • Dry cough 12/12/2018   • Multiple thyroid nodules 07/20/2018   • Fatty liver 06/22/2018   • Chronic dryness of both eyes 12/27/2016   • Vitamin D deficiency 09/22/2016   • TED on CPAP 03/23/2016   • Hyperactivity (behavior)    • Microalbuminuria 06/11/2014   • Diabetes mellitus, type 2 (HCC)    • Essential hypertension    • Dyslipidemia       Allergies:Patient has no known allergies.    Current Outpatient Medications   Medication Sig Dispense Refill   • Triamcinolone Acetonide (NASACORT AQ NA) Spray  in nose.     • Empagliflozin 25 MG Tab Take 25 mg by mouth every day. 90 Tab 3   • metformin (GLUCOPHAGE) 1000 MG tablet Take 0.5 Tabs by mouth 2 times a day, with meals. 180 Tab 3   • lisinopril-hydrochlorothiazide (PRINZIDE, ZESTORETIC) 20-25 MG per tablet Take 1 Tab by mouth  "every day. 90 Tab 3   • atorvastatin (LIPITOR) 10 MG Tab Take 1 Tab by mouth every day. 90 Tab 3   • Cholecalciferol (VITAMIN D PO) Take 5,000 Caps by mouth.     • Blood Glucose Monitoring Suppl Device Meter: Dispense Device of Insurance Preference. Sig. Use as directed for blood sugar monitoring. #1. NR. 1 Device 0   • Blood Glucose Monitoring Suppl SUPPLIES Misc Test strips order: Test strips for One Touch Ultra 2 meter. Sig: use twice daily and prn ssx high or low sugar #100 RF x 3 100 Each 3   • Lancets Misc 1 Each by Does not apply route 3 times a day. 100 Each 2   • levonorgestrel (MIRENA) 20 MCG/24HR IUD 1 Each by Intrauterine route Once.       No current facility-administered medications for this visit.        Social History     Tobacco Use   • Smoking status: Never Smoker   • Smokeless tobacco: Never Used   Substance Use Topics   • Alcohol use: Yes     Alcohol/week: 4.2 - 8.4 oz     Types: 7 - 14 Glasses of wine per week   • Drug use: No     Social History     Social History Narrative   • Not on file       Family History   Problem Relation Age of Onset   • Hypertension Mother    • Hypertension Father    • Heart Attack Father    • Psychiatric Illness Paternal Uncle    • Heart Disease Maternal Grandmother    • Hypertension Maternal Grandmother    • Heart Disease Maternal Grandfather    • Hypertension Maternal Grandfather    • Diabetes Maternal Uncle    • Sleep Apnea Neg Hx        Review of Systems:    Resp: No Shortness of breath  CV: No Chest pain  GI: No Nausea/Vomiting      Exam:    /70 (BP Location: Left arm, Patient Position: Sitting, BP Cuff Size: Adult)   Pulse 74   Temp 36.9 °C (98.4 °F)   Ht 1.6 m (5' 3\")   Wt 82.1 kg (181 lb)   SpO2 97%  Body mass index is 32.06 kg/m².    General:  Well nourished, well developed female in NAD, obese  HENT: Atraumatic, normocephalic  EYES: Extraocular movements intact, pupils equal and reactive to light  NECK: Supple, FROM  CHEST: No deformities, Equal " chest expansion  RESP: Unlabored, no stridor or audible wheeze  ABD: Soft, Nontender, Non-Distended  Extremities: No Clubbing, Cyanosis, or Edema  Skin: Warm/dry, without rashes  Neuro: A/O x 4, CN 2-12 Grossly intact, Motor/sensory grossly intact  Psych: Normal behavior, normal affect      LABS: A1c May 2019 and today: Results reviewed and discussed with the patient, questions answered.      Assessment/Plan:  1. Type 2 diabetes mellitus without complication, without long-term current use of insulin (HCC)  Chronic ongoing medical condition.  Increasing empagliflozin to 25 mg once daily.  Continue metformin 500 mg twice daily.  Follow-up in 3 months.  A1c today 7.1%.  Extensive time spent counseling on diet, exercise, and lifestyle changes.  - Empagliflozin 25 MG Tab; Take 25 mg by mouth every day.  Dispense: 90 Tab; Refill: 3  - metformin (GLUCOPHAGE) 1000 MG tablet; Take 0.5 Tabs by mouth 2 times a day, with meals.  Dispense: 180 Tab; Refill: 3    2. Class 1 obesity with serious comorbidity and body mass index (BMI) of 31.0 to 31.9 in adult, unspecified obesity type  Chronic ongoing medical condition.  Counseled extensively today on diet, exercise, and lifestyle changes to help with weight loss and blood sugar.    3. TED on CPAP  Chronic ongoing medical condition.  Please follow-up on sleep medicine and ENT appointment with Dr. Najera in the next month.    Follow-up in 3 months to follow-up on diabetes and A1c.    Patient was seen for 25 minutes face to face of which, 20 minutes was spent counseling regarding the above mentioned problems.    Please note that this dictation was created using voice recognition software. I have worked with consultants from the vendor as well as technical experts from [employer] to optimize the interface. I have made every reasonable attempt to correct obvious errors, but I expect that there are errors of grammar and possibly content that I did not discover before finalizing the  note.

## 2019-09-04 NOTE — ASSESSMENT & PLAN NOTE
Chronic ongoing medical condition.  Patient's weight is stable however she is actively making dietary changes and finding that she feels better and her blood sugars on average are better.

## 2019-09-05 ENCOUNTER — APPOINTMENT (RX ONLY)
Dept: URBAN - METROPOLITAN AREA CLINIC 22 | Facility: CLINIC | Age: 49
Setting detail: DERMATOLOGY
End: 2019-09-05

## 2019-09-05 DIAGNOSIS — L81.1 CHLOASMA: ICD-10-CM | Status: INADEQUATELY CONTROLLED

## 2019-09-05 PROCEDURE — ? PRESCRIPTION

## 2019-09-05 PROCEDURE — ? TREATMENT REGIMEN

## 2019-09-05 PROCEDURE — 99213 OFFICE O/P EST LOW 20 MIN: CPT

## 2019-09-05 PROCEDURE — ? COUNSELING

## 2019-09-05 RX ORDER — TRETINOIN 0.5 MG/G
CREAM TOPICAL
Qty: 1 | Refills: 2 | Status: ERX | COMMUNITY
Start: 2019-09-05

## 2019-09-05 RX ADMIN — TRETINOIN 1: 0.5 CREAM TOPICAL at 00:00

## 2019-09-05 ASSESSMENT — LOCATION SIMPLE DESCRIPTION DERM
LOCATION SIMPLE: LEFT CHEEK
LOCATION SIMPLE: RIGHT FOREHEAD
LOCATION SIMPLE: RIGHT CHEEK

## 2019-09-05 ASSESSMENT — LOCATION ZONE DERM: LOCATION ZONE: FACE

## 2019-09-05 NOTE — PROCEDURE: TREATMENT REGIMEN
Modify Regimen: Discontinue TriLuma for 1 month, restart in October\\nWill use Tretinoin in the mean time and stop when she restarts TriLuma.
Detail Level: Simple

## 2019-09-05 NOTE — PROCEDURE: COUNSELING
Patient Specific Counseling (Will Not Stick From Patient To Patient): Discussed laser treatment and given Carli Alma card\\nWill plan on retin a when off Hydroquinone
Detail Level: Simple

## 2019-09-30 ENCOUNTER — OFFICE VISIT (OUTPATIENT)
Dept: PULMONOLOGY | Facility: HOSPICE | Age: 49
End: 2019-09-30
Payer: COMMERCIAL

## 2019-09-30 VITALS
WEIGHT: 180.38 LBS | BODY MASS INDEX: 31.96 KG/M2 | DIASTOLIC BLOOD PRESSURE: 84 MMHG | SYSTOLIC BLOOD PRESSURE: 116 MMHG | OXYGEN SATURATION: 96 % | HEART RATE: 66 BPM | HEIGHT: 63 IN

## 2019-09-30 DIAGNOSIS — E04.2 MULTIPLE THYROID NODULES: ICD-10-CM

## 2019-09-30 DIAGNOSIS — G47.33 OSA ON CPAP: ICD-10-CM

## 2019-09-30 DIAGNOSIS — R05.8 DRY COUGH: ICD-10-CM

## 2019-09-30 PROCEDURE — 99214 OFFICE O/P EST MOD 30 MIN: CPT | Performed by: INTERNAL MEDICINE

## 2019-09-30 SDOH — HEALTH STABILITY: MENTAL HEALTH: HOW OFTEN DO YOU HAVE A DRINK CONTAINING ALCOHOL?: 4 OR MORE TIMES A WEEK

## 2019-09-30 SDOH — HEALTH STABILITY: MENTAL HEALTH: HOW MANY STANDARD DRINKS CONTAINING ALCOHOL DO YOU HAVE ON A TYPICAL DAY?: 3 OR 4

## 2019-09-30 SDOH — HEALTH STABILITY: MENTAL HEALTH: HOW OFTEN DO YOU HAVE 6 OR MORE DRINKS ON ONE OCCASION?: WEEKLY

## 2019-09-30 NOTE — PROGRESS NOTES
Dora Sheldon is a 49 y.o. female here for sleep apnea on CPAP, with cough multifactorial. Patient was referred by primary care.    History of Present Illness:      This lady comes in for follow-up of her cough.  She sees the sleep center, has CPAP at 11 cm, and has an excellent response with apnea hypopnea index less than 1 and usage over 7 hours per night.  She wears a nasal mask.    Her cough is in the category of nuisance but certainly can be triggered by the lisinopril, a trial off the lisinopril to see if this would resolve her cough resulted in significant elevation of her blood pressure and lisinopril was resumed.  She is also being assessed for thyroid nodules, sees ear nose and throat, Dr. Najera and this may be contributing as well.  Finally I did explain to her that she could have subtle reflux, and I would like Dr. Najera to at least do a direct vocal cord inspection to make certain there is not an element of laryngeal tracheobronchitis or any evidence of irritation.    Her health maintenance issues include no smoking, body mass index slightly elevated at 31.9 but she is on a conscious weight loss effort, is aware of portion control gentle exercise but also elevated blood sugars and close observation by her primary care have reduced her hemoglobin A1c.    We will await the ENT inspection of her vocal cords, the updates on her thyroid nodules, continue with the CPAP and I will check her in 6 months but sooner if problems occur    Constitutional ROS: No unexpected change in weight, No unexplained fevers  Eyes: No change in vision or blurring or double vision  Mouth/Throat ROS: No sore throat, No recent change in voice or hoarseness  Pulmonary ROS: See present history for pertinent positives  Cardiovascular ROS: No chest pain to suggest acute coronary syndrome  Gastrointestinal ROS: No abdominal pain to suggest peptic disease  Musculoskeletal/Extremities ROS: no acute artritis or unusual  swelling  Hematologic/Lymphatic ROS: No easy bleeding or unusual lymph node swelling  Neurologic ROS: No new or unusual weakness  Psychiatric ROS: No hallucinations  Allergic/Immunologic: No  urticaria or allergic rash      Current Outpatient Medications   Medication Sig Dispense Refill   • Triamcinolone Acetonide (NASACORT AQ NA) Spray  in nose.     • Empagliflozin 25 MG Tab Take 25 mg by mouth every day. 90 Tab 3   • metformin (GLUCOPHAGE) 1000 MG tablet Take 0.5 Tabs by mouth 2 times a day, with meals. (Patient taking differently: Take 1,000 mg by mouth 2 times a day, with meals.) 180 Tab 3   • lisinopril-hydrochlorothiazide (PRINZIDE, ZESTORETIC) 20-25 MG per tablet Take 1 Tab by mouth every day. 90 Tab 3   • atorvastatin (LIPITOR) 10 MG Tab Take 1 Tab by mouth every day. 90 Tab 3   • Cholecalciferol (VITAMIN D PO) Take 5,000 Caps by mouth.     • Blood Glucose Monitoring Suppl Device Meter: Dispense Device of Insurance Preference. Sig. Use as directed for blood sugar monitoring. #1. NR. 1 Device 0   • Blood Glucose Monitoring Suppl SUPPLIES Misc Test strips order: Test strips for One Touch Ultra 2 meter. Sig: use twice daily and prn ssx high or low sugar #100 RF x 3 100 Each 3   • Lancets Misc 1 Each by Does not apply route 3 times a day. 100 Each 2   • levonorgestrel (MIRENA) 20 MCG/24HR IUD 1 Each by Intrauterine route Once.       No current facility-administered medications for this visit.        Social History     Tobacco Use   • Smoking status: Never Smoker   • Smokeless tobacco: Never Used   Substance Use Topics   • Alcohol use: Yes     Alcohol/week: 4.2 - 8.4 oz     Types: 7 - 14 Glasses of wine per week     Frequency: 4 or more times a week     Drinks per session: 3 or 4     Binge frequency: Weekly   • Drug use: No        Past Medical History:   Diagnosis Date   • Anxiety    • Diabetes mellitus, type 2 (HCC)    • Dyslipidemia    • HTN (hypertension)    • Sleep apnea        Past Surgical History:  "  Procedure Laterality Date   • HYSTEROSCOPY NOVASURE-2  2010    Dr. Sandoval   • ABDOMINOPLASTY  1999    Dr. Ricci   • TONSILLECTOMY         Allergies: Patient has no known allergies.    Family History   Problem Relation Age of Onset   • Hypertension Mother    • Hypertension Father    • Heart Attack Father    • Psychiatric Illness Paternal Uncle    • Heart Disease Maternal Grandmother    • Hypertension Maternal Grandmother    • Heart Disease Maternal Grandfather    • Hypertension Maternal Grandfather    • Diabetes Maternal Uncle    • Sleep Apnea Neg Hx        Physical Examination    Vitals:    09/30/19 1426 09/30/19 1427   Height: 1.6 m (5' 3\")    Weight: 81.8 kg (180 lb 6 oz)    Weight % change since last entry.: 0 %    BP: 116/84    Pulse: 66    BMI (Calculated): 31.95    O2 sat % room air:  96 %       General Appearance: alert, no distress  Skin: Skin color, texture, turgor normal. No rashes or lesions.  Eyes: negative  Oropharynx: Lips, mucosa, and tongue normal. Teeth and gums normal. Oropharynx moist and without lesion  Lungs: positive findings: Quiet and clear  Heart: negative. RRR without murmur, gallop, or rubs.  No ectopy.  Abdomen: Abdomen soft, non-tender. . No masses,  No organomegaly  Extremities:  No deformities, edema, or skin discoloration  Joints: No acute arthritis  Peripheral Pulses:perfused  Neurologic: intact grossly  Oropharynx benign without pharyngitis    II (soft palate, uvula, fauces visible)    Imaging: None today    PFTS: None today      Assessment and Plan  1. Multiple thyroid nodules  Work-up ongoing by ear nose and throat  - Influenza Vaccine Quad Injection (PF)    2. Dry cough  Lisinopril may contribute as well as occult gastroesophageal reflux  - Influenza Vaccine Quad Injection (PF)    3. TED on CPAP  Good response to CPAP at 11 cm with excellent usage compliance and reduction of apnea hypopnea index, good mask tolerance and good subjective response to treatment, dictates much " better quality sleep      Followup Return in about 6 months (around 3/30/2020) for follow up visit with Dr. India Boyd.

## 2019-09-30 NOTE — PATIENT INSTRUCTIONS
This lady comes in for follow-up of her cough.  She sees the sleep center, has CPAP at 11 cm, and has an excellent response with apnea hypopnea index less than 1 and usage over 7 hours per night.  She wears a nasal mask.    Her cough is in the category of nuisance but certainly can be triggered by the lisinopril, a trial off the lisinopril to see if this would resolve her cough resulted in significant elevation of her blood pressure and lisinopril was resumed.  She is also being assessed for thyroid nodules, sees ear nose and throat, Dr. Najera and this may be contributing as well.  Finally I did explain to her that she could have subtle reflux, and I would like Dr. Najera to at least do a direct vocal cord inspection to make certain there is not an element of laryngeal tracheobronchitis or any evidence of irritation.    Her health maintenance issues include no smoking, body mass index slightly elevated at 31.9 but she is on a conscious weight loss effort, is aware of portion control gentle exercise but also elevated blood sugars and close observation by her primary care have reduced her hemoglobin A1c.    We will await the ENT inspection of her vocal cords, the updates on her thyroid nodules, continue with the CPAP and I will check her in 6 months but sooner if problems occur

## 2019-11-19 ENCOUNTER — HOSPITAL ENCOUNTER (OUTPATIENT)
Dept: LAB | Facility: MEDICAL CENTER | Age: 49
End: 2019-11-19
Attending: FAMILY MEDICINE
Payer: COMMERCIAL

## 2019-11-19 PROCEDURE — 84443 ASSAY THYROID STIM HORMONE: CPT

## 2019-11-19 PROCEDURE — 84439 ASSAY OF FREE THYROXINE: CPT

## 2019-11-19 PROCEDURE — 36415 COLL VENOUS BLD VENIPUNCTURE: CPT

## 2019-11-19 PROCEDURE — 84481 FREE ASSAY (FT-3): CPT

## 2019-11-20 LAB
T3FREE SERPL-MCNC: 3.77 PG/ML (ref 2.4–4.2)
T4 FREE SERPL-MCNC: 0.93 NG/DL (ref 0.53–1.43)
TSH SERPL DL<=0.005 MIU/L-ACNC: 2.36 UIU/ML (ref 0.38–5.33)

## 2019-12-03 ENCOUNTER — OFFICE VISIT (OUTPATIENT)
Dept: MEDICAL GROUP | Facility: PHYSICIAN GROUP | Age: 49
End: 2019-12-03
Payer: COMMERCIAL

## 2019-12-03 VITALS
HEIGHT: 63 IN | DIASTOLIC BLOOD PRESSURE: 76 MMHG | TEMPERATURE: 97.9 F | OXYGEN SATURATION: 97 % | HEART RATE: 86 BPM | WEIGHT: 176 LBS | BODY MASS INDEX: 31.18 KG/M2 | SYSTOLIC BLOOD PRESSURE: 118 MMHG

## 2019-12-03 DIAGNOSIS — I10 ESSENTIAL HYPERTENSION: ICD-10-CM

## 2019-12-03 DIAGNOSIS — E78.5 DYSLIPIDEMIA: ICD-10-CM

## 2019-12-03 DIAGNOSIS — E11.9 TYPE 2 DIABETES MELLITUS WITHOUT COMPLICATION, WITHOUT LONG-TERM CURRENT USE OF INSULIN (HCC): Primary | ICD-10-CM

## 2019-12-03 DIAGNOSIS — E66.9 CLASS 1 OBESITY WITH SERIOUS COMORBIDITY AND BODY MASS INDEX (BMI) OF 31.0 TO 31.9 IN ADULT, UNSPECIFIED OBESITY TYPE: ICD-10-CM

## 2019-12-03 DIAGNOSIS — E04.2 MULTIPLE THYROID NODULES: ICD-10-CM

## 2019-12-03 LAB
HBA1C MFR BLD: 6.7 % (ref 0–5.6)
INT CON NEG: NEGATIVE
INT CON POS: POSITIVE

## 2019-12-03 PROCEDURE — 99214 OFFICE O/P EST MOD 30 MIN: CPT | Performed by: FAMILY MEDICINE

## 2019-12-03 PROCEDURE — 83036 HEMOGLOBIN GLYCOSYLATED A1C: CPT | Performed by: FAMILY MEDICINE

## 2019-12-03 PROCEDURE — 92250 FUNDUS PHOTOGRAPHY W/I&R: CPT | Mod: TC | Performed by: FAMILY MEDICINE

## 2019-12-03 RX ORDER — TRETINOIN 0.5 MG/G
CREAM TOPICAL
Refills: 2 | COMMUNITY
Start: 2019-09-05

## 2019-12-03 NOTE — ASSESSMENT & PLAN NOTE
Chronic stable medical condition.  Patient currently tolerating lisinopril/hydrochlorothiazide 20/25 mg once daily well.  Blood pressure currently well controlled.  Blood pressure today 118/76

## 2019-12-03 NOTE — ASSESSMENT & PLAN NOTE
Chronic ongoing medical condition.  Patient currently taking metformin 2000 mg a day, Jardiance 25 mg daily.  Previous A1c 7.1% on 9/4/2019, and 6.7% today.  Denies any urinary tract infections or vaginal yeast infections.  States that she is tolerating the medicine well.

## 2019-12-03 NOTE — ASSESSMENT & PLAN NOTE
Chronic improving medical condition.  BMI today 31.18 and down 5 pounds since 3 months ago.  Continues to take Jardiance 25 mg once daily.

## 2019-12-03 NOTE — PROGRESS NOTES
CC:The primary encounter diagnosis was Type 2 diabetes mellitus without complication, without long-term current use of insulin (HCC). Diagnoses of Essential hypertension, Class 1 obesity with serious comorbidity and body mass index (BMI) of 31.0 to 31.9 in adult, unspecified obesity type, Multiple thyroid nodules, and Dyslipidemia were also pertinent to this visit.      HISTORY OF PRESENT ILLNESS: Patient is a 49 y.o. female established patient who presents today to follow-up on diabetes and increasing Jardiance to 25 mg daily.    Health Maintenance: Completed    Diabetes mellitus, type 2  Chronic ongoing medical condition.  Patient currently taking metformin 2000 mg a day, Jardiance 25 mg daily.  Previous A1c 7.1% on 9/4/2019, and 6.7% today.  Denies any urinary tract infections or vaginal yeast infections.  States that she is tolerating the medicine well.    Essential hypertension  Chronic stable medical condition.  Patient currently tolerating lisinopril/hydrochlorothiazide 20/25 mg once daily well.  Blood pressure currently well controlled.  Blood pressure today 118/76    Obesity  Chronic improving medical condition.  BMI today 31.18 and down 5 pounds since 3 months ago.  Continues to take Jardiance 25 mg once daily.    Multiple thyroid nodules  Chronic ongoing medical condition.  Currently following with Dr. Najera of ENT for goiter.  But denies any neck fullness or difficulty swallowing currently.    Dyslipidemia  Chronic ongoing medical condition.  Continues to take atorvastatin 10 mg daily.      Patient Active Problem List    Diagnosis Date Noted   • Obesity 05/22/2019   • Dry cough 12/12/2018   • Multiple thyroid nodules 07/20/2018   • Fatty liver 06/22/2018   • Chronic dryness of both eyes 12/27/2016   • Vitamin D deficiency 09/22/2016   • TED on CPAP 03/23/2016   • Hyperactivity (behavior)    • Microalbuminuria 06/11/2014   • Diabetes mellitus, type 2 (HCC)    • Essential hypertension    • Dyslipidemia        Allergies:Patient has no known allergies.    Current Outpatient Medications   Medication Sig Dispense Refill   • Triamcinolone Acetonide (NASACORT AQ NA) Spray  in nose.     • Empagliflozin 25 MG Tab Take 25 mg by mouth every day. 90 Tab 3   • metformin (GLUCOPHAGE) 1000 MG tablet Take 0.5 Tabs by mouth 2 times a day, with meals. (Patient taking differently: Take 1,000 mg by mouth 2 times a day, with meals.) 180 Tab 3   • lisinopril-hydrochlorothiazide (PRINZIDE, ZESTORETIC) 20-25 MG per tablet Take 1 Tab by mouth every day. 90 Tab 3   • atorvastatin (LIPITOR) 10 MG Tab Take 1 Tab by mouth every day. 90 Tab 3   • Cholecalciferol (VITAMIN D PO) Take 5,000 Caps by mouth.     • Blood Glucose Monitoring Suppl Device Meter: Dispense Device of Insurance Preference. Sig. Use as directed for blood sugar monitoring. #1. NR. 1 Device 0   • Blood Glucose Monitoring Suppl SUPPLIES Misc Test strips order: Test strips for One Touch Ultra 2 meter. Sig: use twice daily and prn ssx high or low sugar #100 RF x 3 100 Each 3   • Lancets Misc 1 Each by Does not apply route 3 times a day. 100 Each 2   • levonorgestrel (MIRENA) 20 MCG/24HR IUD 1 Each by Intrauterine route Once.     • tretinoin (RETIN-A) 0.05 % cream APPLY PEA SIZED AMOUNT TO FACE AT BEDTIME.  2     No current facility-administered medications for this visit.        Social History     Tobacco Use   • Smoking status: Never Smoker   • Smokeless tobacco: Never Used   Substance Use Topics   • Alcohol use: Yes     Alcohol/week: 4.2 - 8.4 oz     Types: 7 - 14 Glasses of wine per week     Frequency: 4 or more times a week     Drinks per session: 3 or 4     Binge frequency: Weekly   • Drug use: No     Social History     Patient does not qualify to have social determinant information on file (likely too young).   Social History Narrative   • Not on file       Family History   Problem Relation Age of Onset   • Hypertension Mother    • Hypertension Father    • Heart Attack  "Father    • Psychiatric Illness Paternal Uncle    • Heart Disease Maternal Grandmother    • Hypertension Maternal Grandmother    • Heart Disease Maternal Grandfather    • Hypertension Maternal Grandfather    • Diabetes Maternal Uncle    • Sleep Apnea Neg Hx        Review of Systems:    Constitutional: No Fevers, Chills  Eyes: No vision changes  ENT: No hearing changes  Resp: No Shortness of breath  CV: No Chest pain  GI: No Nausea/Vomiting  MSK: No weakness  Skin: No rashes  Neuro: No Headaches  Psych: No Suicidal ideations    All remaining systems reviewed and found to be negative, except as stated above.    Exam:    /76 (BP Location: Right arm, Patient Position: Sitting, BP Cuff Size: Adult)   Pulse 86   Temp 36.6 °C (97.9 °F) (Temporal)   Ht 1.6 m (5' 3\")   Wt 79.8 kg (176 lb)   SpO2 97%  Body mass index is 31.18 kg/m².    General:  Well nourished, well developed female in NAD, obese  HENT: Atraumatic, normocephalic  EYES: Extraocular movements intact, pupils equal and reactive to light  NECK: Supple, FROM  CHEST: No deformities, Equal chest expansion  RESP: Unlabored, no stridor or audible wheeze  ABD: Soft, Nontender, Non-Distended  Extremities: No Clubbing, Cyanosis, or Edema  Skin: Warm/dry, without rashes  Neuro: A/O x 4, CN 2-12 Grossly intact, Motor/sensory grossly intact  Psych: Normal behavior, normal affect      LABS: 9/4/2019: Results reviewed and discussed with the patient, questions answered.      Assessment/Plan:  1. Type 2 diabetes mellitus without complication, without long-term current use of insulin (HCC)  Chronic improving medical condition.  Labs as below.  Follow-up in 4 to 6 months.  Hemoglobin A1c 6.7% today.  Continue metformin 1000 mg twice daily, Jardiance 25 mg once daily.  - POCT Hemoglobin A1C  - CBC WITH DIFFERENTIAL; Future  - Comp Metabolic Panel; Future  - POCT Retinal Eye Exam  - Diabetic Monofilament LE Exam  - Lipid Profile; Future    2. Essential " hypertension  Chronic stable medical condition.  Continue lisinopril/hydrochlorothiazide as prescribed.    3. Class 1 obesity with serious comorbidity and body mass index (BMI) of 31.0 to 31.9 in adult, unspecified obesity type  Chronic ongoing medical condition.  Counseled on diet, exercise, and lifestyle changes to help with weight loss.    4. Multiple thyroid nodules  Chronic ongoing medical condition.  Continue follow-up with Dr. Najera.    5. Dyslipidemia  Chronic stable medical condition.  Labs as above.  Continue atorvastatin 10 mg daily.    Follow-up in 4 to 6 months for hemoglobin A1c.    Please note that this dictation was created using voice recognition software. I have worked with consultants from the vendor as well as technical experts from [facility] [employer] to optimize the interface. I have made every reasonable attempt to correct obvious errors, but I expect that there are errors of grammar and possibly content that I did not discover before finalizing the note.

## 2019-12-03 NOTE — ASSESSMENT & PLAN NOTE
Chronic ongoing medical condition.  Currently following with Dr. Najera of ENT for goiter.  But denies any neck fullness or difficulty swallowing currently.

## 2019-12-11 LAB — RETINAL SCREEN: NORMAL

## 2020-01-09 ENCOUNTER — APPOINTMENT (RX ONLY)
Dept: URBAN - METROPOLITAN AREA CLINIC 22 | Facility: CLINIC | Age: 50
Setting detail: DERMATOLOGY
End: 2020-01-09

## 2020-01-09 DIAGNOSIS — D22 MELANOCYTIC NEVI: ICD-10-CM

## 2020-01-09 DIAGNOSIS — Z71.89 OTHER SPECIFIED COUNSELING: ICD-10-CM

## 2020-01-09 DIAGNOSIS — L81.1 CHLOASMA: ICD-10-CM

## 2020-01-09 DIAGNOSIS — L81.4 OTHER MELANIN HYPERPIGMENTATION: ICD-10-CM

## 2020-01-09 DIAGNOSIS — L82.1 OTHER SEBORRHEIC KERATOSIS: ICD-10-CM

## 2020-01-09 PROBLEM — D22.5 MELANOCYTIC NEVI OF TRUNK: Status: ACTIVE | Noted: 2020-01-09

## 2020-01-09 PROCEDURE — ? TREATMENT REGIMEN

## 2020-01-09 PROCEDURE — 99214 OFFICE O/P EST MOD 30 MIN: CPT

## 2020-01-09 PROCEDURE — ? COUNSELING

## 2020-01-09 ASSESSMENT — LOCATION DETAILED DESCRIPTION DERM
LOCATION DETAILED: INFERIOR THORACIC SPINE
LOCATION DETAILED: LEFT VENTRAL PROXIMAL FOREARM
LOCATION DETAILED: LEFT CENTRAL MALAR CHEEK
LOCATION DETAILED: INFERIOR MID FOREHEAD
LOCATION DETAILED: RIGHT LATERAL MALAR CHEEK
LOCATION DETAILED: LOWER STERNUM
LOCATION DETAILED: SUPERIOR THORACIC SPINE
LOCATION DETAILED: RIGHT VENTRAL PROXIMAL FOREARM
LOCATION DETAILED: LEFT LATERAL BREAST 2-3:00 REGION
LOCATION DETAILED: RIGHT MEDIAL FOREHEAD

## 2020-01-09 ASSESSMENT — LOCATION SIMPLE DESCRIPTION DERM
LOCATION SIMPLE: INFERIOR FOREHEAD
LOCATION SIMPLE: CHEST
LOCATION SIMPLE: RIGHT CHEEK
LOCATION SIMPLE: UPPER BACK
LOCATION SIMPLE: LEFT FOREARM
LOCATION SIMPLE: RIGHT FOREARM
LOCATION SIMPLE: RIGHT FOREHEAD
LOCATION SIMPLE: LEFT BREAST
LOCATION SIMPLE: LEFT CHEEK

## 2020-01-09 ASSESSMENT — LOCATION ZONE DERM
LOCATION ZONE: TRUNK
LOCATION ZONE: ARM
LOCATION ZONE: FACE

## 2020-01-09 NOTE — PROCEDURE: COUNSELING
Detail Level: Generalized
Detail Level: Zone
Detail Level: Detailed
Detail Level: Simple
Patient Specific Counseling (Will Not Stick From Patient To Patient): Discussed laser treatment and given Carli Alma card\\nWill plan on retin a when off Hydroquinone

## 2020-01-09 NOTE — PROCEDURE: TREATMENT REGIMEN
Continue Regimen: Tri-faizan daily for 3 months \\nTretinoin nightly when not using triluma
Detail Level: Simple

## 2020-01-09 NOTE — HPI: DISCOLORATION (MELASMA)
How Severe Are They?: mild
Is This A New Presentation, Or A Follow-Up?: Follow Up Melasma
Additional History: Patient has discontinued tri-faizan and tretinoin for one month.

## 2020-02-26 ENCOUNTER — OFFICE VISIT (OUTPATIENT)
Dept: URGENT CARE | Facility: PHYSICIAN GROUP | Age: 50
End: 2020-02-26
Payer: COMMERCIAL

## 2020-02-26 VITALS
SYSTOLIC BLOOD PRESSURE: 122 MMHG | TEMPERATURE: 99.3 F | OXYGEN SATURATION: 96 % | HEIGHT: 63 IN | HEART RATE: 83 BPM | BODY MASS INDEX: 30.3 KG/M2 | RESPIRATION RATE: 14 BRPM | DIASTOLIC BLOOD PRESSURE: 80 MMHG | WEIGHT: 171 LBS

## 2020-02-26 DIAGNOSIS — R05.9 COUGH: ICD-10-CM

## 2020-02-26 DIAGNOSIS — J32.9 RHINOSINUSITIS: ICD-10-CM

## 2020-02-26 PROCEDURE — 99214 OFFICE O/P EST MOD 30 MIN: CPT | Performed by: FAMILY MEDICINE

## 2020-02-26 RX ORDER — DOXYCYCLINE HYCLATE 100 MG
100 TABLET ORAL 2 TIMES DAILY
Qty: 14 TAB | Refills: 0 | Status: SHIPPED | OUTPATIENT
Start: 2020-02-26 | End: 2020-03-04

## 2020-02-26 ASSESSMENT — ENCOUNTER SYMPTOMS
EYE REDNESS: 0
VOMITING: 0
MYALGIAS: 0
WEIGHT LOSS: 0
EYE DISCHARGE: 0
NAUSEA: 0

## 2020-02-26 NOTE — PROGRESS NOTES
"Subjective:      Dora Sheldon is a 49 y.o. female who presents with URI (cold sx X 13 days, yellow phlem, cough, just started jardiance and wanted to see if its a possible side effect)            2 weeks progressively worse sinus pressure and drainage. Subjective fever yesterday. +PMH sinusitis/no sinus surgery. Productive cough without blood in sputum. No SOB/wheeze. No PMH asthma/copd/pneumonia. Min relief moderate to severe sx's with OTC meds. No other aggravating or alleviating factors.        Review of Systems   Constitutional: Negative for malaise/fatigue and weight loss.   Eyes: Negative for discharge and redness.   Gastrointestinal: Negative for nausea and vomiting.   Musculoskeletal: Negative for joint pain and myalgias.   Skin: Negative for itching and rash.     .  Medications, Allergies, and current problem list reviewed today in Epic       Objective:     /80   Pulse 83   Temp 37.4 °C (99.3 °F)   Resp 14   Ht 1.6 m (5' 3\")   Wt 77.6 kg (171 lb)   SpO2 96%   BMI 30.29 kg/m²      Physical Exam  Constitutional:       General: She is not in acute distress.     Appearance: She is well-developed.   HENT:      Head: Normocephalic and atraumatic.      Right Ear: Tympanic membrane normal.      Left Ear: Tympanic membrane normal.      Nose: Congestion and rhinorrhea present.   Eyes:      Conjunctiva/sclera: Conjunctivae normal.   Cardiovascular:      Rate and Rhythm: Normal rate and regular rhythm.      Heart sounds: Normal heart sounds. No murmur.   Pulmonary:      Effort: Pulmonary effort is normal.      Breath sounds: Normal breath sounds. No wheezing.   Skin:     General: Skin is warm and dry.      Findings: No rash.   Neurological:      Mental Status: She is alert and oriented to person, place, and time.                 Assessment/Plan:     1. Rhinosinusitis  doxycycline (VIBRAMYCIN) 100 MG Tab   2. Cough  Hydrocod Polst-CPM Polst ER (TUSSIONEX) 10-8 MG/5ML Suspension Extended Release "     Differential diagnosis, natural history, supportive care, and indications for immediate follow-up discussed at length.     Nasal saline, decongestant, nasal CS

## 2020-03-30 ENCOUNTER — APPOINTMENT (OUTPATIENT)
Dept: PULMONOLOGY | Facility: HOSPICE | Age: 50
End: 2020-03-30
Payer: COMMERCIAL

## 2020-04-02 ENCOUNTER — HOSPITAL ENCOUNTER (OUTPATIENT)
Dept: LAB | Facility: MEDICAL CENTER | Age: 50
End: 2020-04-02
Attending: FAMILY MEDICINE
Payer: COMMERCIAL

## 2020-04-02 DIAGNOSIS — E11.9 TYPE 2 DIABETES MELLITUS WITHOUT COMPLICATION, WITHOUT LONG-TERM CURRENT USE OF INSULIN (HCC): ICD-10-CM

## 2020-04-02 LAB
ALBUMIN SERPL BCP-MCNC: 4.9 G/DL (ref 3.2–4.9)
ALBUMIN/GLOB SERPL: 1.5 G/DL
ALP SERPL-CCNC: 75 U/L (ref 30–99)
ALT SERPL-CCNC: 32 U/L (ref 2–50)
ANION GAP SERPL CALC-SCNC: 13 MMOL/L (ref 7–16)
AST SERPL-CCNC: 18 U/L (ref 12–45)
BASOPHILS # BLD AUTO: 0.8 % (ref 0–1.8)
BASOPHILS # BLD: 0.07 K/UL (ref 0–0.12)
BILIRUB SERPL-MCNC: 1.4 MG/DL (ref 0.1–1.5)
BUN SERPL-MCNC: 18 MG/DL (ref 8–22)
CALCIUM SERPL-MCNC: 10.3 MG/DL (ref 8.5–10.5)
CHLORIDE SERPL-SCNC: 98 MMOL/L (ref 96–112)
CHOLEST SERPL-MCNC: 143 MG/DL (ref 100–199)
CO2 SERPL-SCNC: 27 MMOL/L (ref 20–33)
CREAT SERPL-MCNC: 0.61 MG/DL (ref 0.5–1.4)
EOSINOPHIL # BLD AUTO: 0.22 K/UL (ref 0–0.51)
EOSINOPHIL NFR BLD: 2.5 % (ref 0–6.9)
ERYTHROCYTE [DISTWIDTH] IN BLOOD BY AUTOMATED COUNT: 44.3 FL (ref 35.9–50)
FASTING STATUS PATIENT QL REPORTED: NORMAL
GLOBULIN SER CALC-MCNC: 3.2 G/DL (ref 1.9–3.5)
GLUCOSE SERPL-MCNC: 145 MG/DL (ref 65–99)
HCT VFR BLD AUTO: 44.1 % (ref 37–47)
HDLC SERPL-MCNC: 46 MG/DL
HGB BLD-MCNC: 14.9 G/DL (ref 12–16)
IMM GRANULOCYTES # BLD AUTO: 0.02 K/UL (ref 0–0.11)
IMM GRANULOCYTES NFR BLD AUTO: 0.2 % (ref 0–0.9)
LDLC SERPL CALC-MCNC: 80 MG/DL
LYMPHOCYTES # BLD AUTO: 1.95 K/UL (ref 1–4.8)
LYMPHOCYTES NFR BLD: 22 % (ref 22–41)
MCH RBC QN AUTO: 31.6 PG (ref 27–33)
MCHC RBC AUTO-ENTMCNC: 33.8 G/DL (ref 33.6–35)
MCV RBC AUTO: 93.6 FL (ref 81.4–97.8)
MONOCYTES # BLD AUTO: 0.64 K/UL (ref 0–0.85)
MONOCYTES NFR BLD AUTO: 7.2 % (ref 0–13.4)
NEUTROPHILS # BLD AUTO: 5.98 K/UL (ref 2–7.15)
NEUTROPHILS NFR BLD: 67.3 % (ref 44–72)
NRBC # BLD AUTO: 0 K/UL
NRBC BLD-RTO: 0 /100 WBC
PLATELET # BLD AUTO: 241 K/UL (ref 164–446)
PMV BLD AUTO: 8.6 FL (ref 9–12.9)
POTASSIUM SERPL-SCNC: 4.3 MMOL/L (ref 3.6–5.5)
PROT SERPL-MCNC: 8.1 G/DL (ref 6–8.2)
RBC # BLD AUTO: 4.71 M/UL (ref 4.2–5.4)
SODIUM SERPL-SCNC: 138 MMOL/L (ref 135–145)
TRIGL SERPL-MCNC: 84 MG/DL (ref 0–149)
WBC # BLD AUTO: 8.9 K/UL (ref 4.8–10.8)

## 2020-04-02 PROCEDURE — 36415 COLL VENOUS BLD VENIPUNCTURE: CPT

## 2020-04-02 PROCEDURE — 80053 COMPREHEN METABOLIC PANEL: CPT

## 2020-04-02 PROCEDURE — 85025 COMPLETE CBC W/AUTO DIFF WBC: CPT

## 2020-04-02 PROCEDURE — 80061 LIPID PANEL: CPT

## 2020-04-03 ENCOUNTER — OFFICE VISIT (OUTPATIENT)
Dept: MEDICAL GROUP | Facility: PHYSICIAN GROUP | Age: 50
End: 2020-04-03
Payer: COMMERCIAL

## 2020-04-03 VITALS — HEIGHT: 63 IN | BODY MASS INDEX: 30.48 KG/M2 | WEIGHT: 172 LBS | TEMPERATURE: 97.9 F

## 2020-04-03 DIAGNOSIS — E11.9 TYPE 2 DIABETES MELLITUS WITHOUT COMPLICATION, WITHOUT LONG-TERM CURRENT USE OF INSULIN (HCC): ICD-10-CM

## 2020-04-03 DIAGNOSIS — E78.5 DYSLIPIDEMIA: ICD-10-CM

## 2020-04-03 DIAGNOSIS — I10 ESSENTIAL HYPERTENSION: ICD-10-CM

## 2020-04-03 DIAGNOSIS — E66.09 CLASS 1 OBESITY DUE TO EXCESS CALORIES WITH SERIOUS COMORBIDITY AND BODY MASS INDEX (BMI) OF 31.0 TO 31.9 IN ADULT: ICD-10-CM

## 2020-04-03 PROBLEM — E66.9 OBESITY: Status: RESOLVED | Noted: 2019-05-22 | Resolved: 2020-04-03

## 2020-04-03 PROCEDURE — 99214 OFFICE O/P EST MOD 30 MIN: CPT | Mod: 95,CR | Performed by: FAMILY MEDICINE

## 2020-04-03 ASSESSMENT — FIBROSIS 4 INDEX: FIB4 SCORE: 0.66

## 2020-04-03 ASSESSMENT — PATIENT HEALTH QUESTIONNAIRE - PHQ9: CLINICAL INTERPRETATION OF PHQ2 SCORE: 0

## 2020-04-03 NOTE — PROGRESS NOTES
Telemedicine Visit: Established Patient     This encounter was conducted via Zoom .   Verbal consent was obtained. Patient's identity was verified.    Subjective:   CC: Follow-up labs  Dora Sheldon is a 49 y.o. female presenting for evaluation and management of:    Diabetes mellitus type 2.  Patient has been tolerating her Jardiance 25 mg once daily, metformin 1000 mg twice daily.    ROS   Denies any recent fevers or chills. No nausea or vomiting. No chest pains or shortness of breath.     No Known Allergies    Current medicines (including changes today)  Current Outpatient Medications   Medication Sig Dispense Refill   • tretinoin (RETIN-A) 0.05 % cream APPLY PEA SIZED AMOUNT TO FACE AT BEDTIME.  2   • Triamcinolone Acetonide (NASACORT AQ NA) Spray  in nose.     • Empagliflozin 25 MG Tab Take 25 mg by mouth every day. 90 Tab 3   • metformin (GLUCOPHAGE) 1000 MG tablet Take 0.5 Tabs by mouth 2 times a day, with meals. (Patient taking differently: Take 1,000 mg by mouth 2 times a day, with meals.) 180 Tab 3   • lisinopril-hydrochlorothiazide (PRINZIDE, ZESTORETIC) 20-25 MG per tablet Take 1 Tab by mouth every day. 90 Tab 3   • atorvastatin (LIPITOR) 10 MG Tab Take 1 Tab by mouth every day. 90 Tab 3   • Cholecalciferol (VITAMIN D PO) Take 5,000 Caps by mouth.     • Blood Glucose Monitoring Suppl Device Meter: Dispense Device of Insurance Preference. Sig. Use as directed for blood sugar monitoring. #1. NR. 1 Device 0   • Blood Glucose Monitoring Suppl SUPPLIES Misc Test strips order: Test strips for One Touch Ultra 2 meter. Sig: use twice daily and prn ssx high or low sugar #100 RF x 3 100 Each 3   • Lancets Misc 1 Each by Does not apply route 3 times a day. 100 Each 2   • levonorgestrel (MIRENA) 20 MCG/24HR IUD 1 Each by Intrauterine route Once.       No current facility-administered medications for this visit.        Patient Active Problem List    Diagnosis Date Noted   • Obesity due to excess calories 05/22/2019    • Dry cough 12/12/2018   • Multiple thyroid nodules 07/20/2018   • Fatty liver 06/22/2018   • Chronic dryness of both eyes 12/27/2016   • Vitamin D deficiency 09/22/2016   • TED on CPAP 03/23/2016   • Hyperactivity (behavior)    • Microalbuminuria 06/11/2014   • Diabetes mellitus, type 2 (HCC)    • Essential hypertension    • Dyslipidemia        Family History   Problem Relation Age of Onset   • Hypertension Mother    • Hypertension Father    • Heart Attack Father    • Psychiatric Illness Paternal Uncle    • Heart Disease Maternal Grandmother    • Hypertension Maternal Grandmother    • Heart Disease Maternal Grandfather    • Hypertension Maternal Grandfather    • Diabetes Maternal Uncle    • Sleep Apnea Neg Hx        She  has a past medical history of Anxiety, Diabetes mellitus, type 2 (HCC), Dyslipidemia, HTN (hypertension), Obesity (5/22/2019), and Sleep apnea.  She  has a past surgical history that includes hysteroscopy novasure-2 (2010); abdominoplasty (1999); and tonsillectomy.       Objective:   Vitals obtained by patient:  Respirations through observation: 12    Physical Exam:  Constitutional: Alert, no distress, well-groomed.  Skin: No rashes in visible areas.  Eye: Round. Conjunctiva clear, lids normal. No icterus.   ENMT: Lips pink without lesions, good dentition, moist mucous membranes. Phonation normal.  Neck: No masses, no thyromegaly. Moves freely without pain.  CV: Pulse as reported by patient  Respiratory: Unlabored respiratory effort, no cough or audible wheeze  Psych: Alert and oriented x3, normal affect and mood.       Assessment and Plan:   The following treatment plan was discussed:     1. Type 2 diabetes mellitus without complication, without long-term current use of insulin (HCC)  - HEMOGLOBIN A1C; Future  - MICROALBUMIN CREAT RATIO URINE; Future    2. Dyslipidemia    3. Class 1 obesity due to excess calories with serious comorbidity and body mass index (BMI) of 31.0 to 31.9 in adult    4.  Essential hypertension    Counseled on continued metformin and Jardiance use.  Continue atorvastatin for primary prevention of heart disease.  Continue lisinopril/hydrochlorothiazide 20/25 mg daily for renal protection and blood pressure control.    Counseled on diet, exercise, and lifestyle changes to help with weight loss.  Counseled on the benefits of exercise and cardiovascular health.  Counseled on limiting refined carbohydrates and focus on healthy proteins and healthy fats.  Discussed referrals to weight loss management services and Critical access hospital improvement program.      Follow-up: Return in about 3 months (around 7/3/2020) for Medication management, A1c, Diabetes.       Please note that this dictation was created using voice recognition software. I have worked with consultants from the vendor as well as technical experts from [employer] [facility] to optimize the interface. I have made every reasonable attempt to correct obvious errors, but I expect that there are errors of grammar and possibly content that I did not discover before finalizing the note.

## 2020-07-16 ENCOUNTER — APPOINTMENT (RX ONLY)
Dept: URBAN - METROPOLITAN AREA CLINIC 22 | Facility: CLINIC | Age: 50
Setting detail: DERMATOLOGY
End: 2020-07-16

## 2020-07-16 DIAGNOSIS — L81.1 CHLOASMA: ICD-10-CM | Status: IMPROVED

## 2020-07-16 PROCEDURE — ? PRESCRIPTION

## 2020-07-16 PROCEDURE — ? COUNSELING

## 2020-07-16 PROCEDURE — ? TREATMENT REGIMEN

## 2020-07-16 PROCEDURE — ? ADDITIONAL NOTES

## 2020-07-16 PROCEDURE — 99212 OFFICE O/P EST SF 10 MIN: CPT

## 2020-07-16 RX ORDER — FLUOCINOLONE ACETONIDE, HYDROQUINONE, AND TRETINOIN .1; 40; .5 MG/G; MG/G; MG/G
CREAM TOPICAL QHS
Qty: 1 | Refills: 1

## 2020-07-16 RX ORDER — TRETIONIN 0.5 MG/G
CREAM TOPICAL
Qty: 1 | Refills: 2

## 2020-07-16 RX ORDER — AZELAIC ACID 0.15 G/G
AEROSOL, FOAM TOPICAL
Qty: 50 | Refills: 1 | COMMUNITY
Start: 2020-07-16

## 2020-07-16 RX ORDER — AZELAIC ACID 0.2 G/G
CREAM CUTANEOUS
Qty: 50 | Refills: 1 | COMMUNITY
Start: 2020-07-16

## 2020-07-16 RX ADMIN — AZELAIC ACID: 0.15 AEROSOL, FOAM TOPICAL at 00:00

## 2020-07-16 RX ADMIN — AZELAIC ACID 1: 0.2 CREAM CUTANEOUS at 00:00

## 2020-07-16 ASSESSMENT — LOCATION SIMPLE DESCRIPTION DERM
LOCATION SIMPLE: RIGHT CHEEK
LOCATION SIMPLE: LEFT CHEEK
LOCATION SIMPLE: RIGHT FOREHEAD

## 2020-07-16 ASSESSMENT — LOCATION DETAILED DESCRIPTION DERM
LOCATION DETAILED: LEFT CENTRAL MALAR CHEEK
LOCATION DETAILED: RIGHT MEDIAL FOREHEAD
LOCATION DETAILED: RIGHT LATERAL MALAR CHEEK

## 2020-07-16 ASSESSMENT — LOCATION ZONE DERM: LOCATION ZONE: FACE

## 2020-07-16 NOTE — HPI: DISCOLORATION (MELASMA)
How Severe Are They?: moderate
Is This A New Presentation, Or A Follow-Up?: Follow Up Melasma
Additional History: patient ran out of tri-faizan. Low on tretinoin but used through June. Limiting sun-exposure. Has not used anything in 2 weeks.

## 2020-07-16 NOTE — PROCEDURE: TREATMENT REGIMEN
Continue Regimen: Tri-faizan daily for 2 months on 2 months off\\nAzelaic acid nightly as tolerated\\nTretinoin nightly when off Triluma
Detail Level: Simple

## 2020-07-29 ENCOUNTER — HOSPITAL ENCOUNTER (OUTPATIENT)
Dept: LAB | Facility: MEDICAL CENTER | Age: 50
End: 2020-07-29
Attending: FAMILY MEDICINE
Payer: COMMERCIAL

## 2020-07-29 DIAGNOSIS — E11.9 TYPE 2 DIABETES MELLITUS WITHOUT COMPLICATION, WITHOUT LONG-TERM CURRENT USE OF INSULIN (HCC): ICD-10-CM

## 2020-07-29 LAB
CREAT UR-MCNC: 59.72 MG/DL
EST. AVERAGE GLUCOSE BLD GHB EST-MCNC: 148 MG/DL
HBA1C MFR BLD: 6.8 % (ref 0–5.6)
MICROALBUMIN UR-MCNC: <1.2 MG/DL
MICROALBUMIN/CREAT UR: NORMAL MG/G (ref 0–30)

## 2020-07-29 PROCEDURE — 82043 UR ALBUMIN QUANTITATIVE: CPT

## 2020-07-29 PROCEDURE — 82570 ASSAY OF URINE CREATININE: CPT

## 2020-07-29 PROCEDURE — 83036 HEMOGLOBIN GLYCOSYLATED A1C: CPT

## 2020-07-29 PROCEDURE — 36415 COLL VENOUS BLD VENIPUNCTURE: CPT

## 2020-07-31 ENCOUNTER — TELEMEDICINE (OUTPATIENT)
Dept: MEDICAL GROUP | Facility: PHYSICIAN GROUP | Age: 50
End: 2020-07-31
Payer: COMMERCIAL

## 2020-07-31 VITALS — WEIGHT: 169 LBS | HEIGHT: 63 IN | BODY MASS INDEX: 29.95 KG/M2 | TEMPERATURE: 98.6 F

## 2020-07-31 DIAGNOSIS — I10 ESSENTIAL HYPERTENSION: ICD-10-CM

## 2020-07-31 DIAGNOSIS — E78.5 DYSLIPIDEMIA: ICD-10-CM

## 2020-07-31 DIAGNOSIS — E66.3 OVERWEIGHT: ICD-10-CM

## 2020-07-31 DIAGNOSIS — E11.9 TYPE 2 DIABETES MELLITUS WITHOUT COMPLICATION, WITHOUT LONG-TERM CURRENT USE OF INSULIN (HCC): ICD-10-CM

## 2020-07-31 PROBLEM — E66.09 OBESITY DUE TO EXCESS CALORIES: Status: RESOLVED | Noted: 2019-05-22 | Resolved: 2020-07-31

## 2020-07-31 PROCEDURE — 99214 OFFICE O/P EST MOD 30 MIN: CPT | Mod: 95,CR | Performed by: FAMILY MEDICINE

## 2020-07-31 RX ORDER — ATORVASTATIN CALCIUM 10 MG/1
TABLET, FILM COATED ORAL
Qty: 90 TAB | Refills: 4 | Status: SHIPPED | OUTPATIENT
Start: 2020-07-31 | End: 2021-08-16

## 2020-07-31 RX ORDER — LISINOPRIL AND HYDROCHLOROTHIAZIDE 25; 20 MG/1; MG/1
TABLET ORAL
Qty: 90 TAB | Refills: 4 | Status: SHIPPED | OUTPATIENT
Start: 2020-07-31 | End: 2021-08-13

## 2020-07-31 ASSESSMENT — FIBROSIS 4 INDEX: FIB4 SCORE: 0.66

## 2020-07-31 NOTE — TELEPHONE ENCOUNTER
Received request via: Pharmacy    Was the patient seen in the last year in this department? Yes 4/3/2020    Does the patient have an active prescription (recently filled or refills available) for medication(s) requested? No

## 2020-08-05 NOTE — PROGRESS NOTES
Telemedicine Visit: Established Patient     This evaluation was conducted via Zoom, using secure and encrypted videoconferencing technology.  The patient was physical located at Home in [place], [place] and the physician was located in [address] [place] [address].  The patient was presented by self, at home.  The patient's identity was confirmed and verbal consent for the telemedicine encounter was obtained.      Subjective:   CC: Follow-up on diabetes and weight.  Dora Sheldon is a 49 y.o. female presenting for evaluation and management of:    Patient called to discuss most recent laboratory studies as well as follow-up on diabetes and weight loss.  Patient's most recent hemoglobin A1c on 7/29/2020 demonstrated 6.8%.  Patient states that she continues to struggle with weight loss despite ongoing efforts using calorie restriction, intermittent fasting, and eliminating refined carbohydrates.    ROS   Denies any recent fevers or chills. No nausea or vomiting. No chest pains or shortness of breath.     No Known Allergies    Current medicines (including changes today)  Current Outpatient Medications   Medication Sig Dispense Refill   • tretinoin (RETIN-A) 0.05 % cream APPLY PEA SIZED AMOUNT TO FACE AT BEDTIME.  2   • Triamcinolone Acetonide (NASACORT AQ NA) Spray  in nose.     • Empagliflozin 25 MG Tab Take 25 mg by mouth every day. 90 Tab 3   • metformin (GLUCOPHAGE) 1000 MG tablet Take 0.5 Tabs by mouth 2 times a day, with meals. (Patient taking differently: Take 1,000 mg by mouth 2 times a day, with meals.) 180 Tab 3   • Cholecalciferol (VITAMIN D PO) Take 5,000 Caps by mouth.     • Blood Glucose Monitoring Suppl Device Meter: Dispense Device of Insurance Preference. Sig. Use as directed for blood sugar monitoring. #1. NR. 1 Device 0   • Blood Glucose Monitoring Suppl SUPPLIES Misc Test strips order: Test strips for One Touch Ultra 2 meter. Sig: use twice daily and prn ssx high or low sugar #100 RF x 3 100 Each 3   • Lancets  "Misc 1 Each by Does not apply route 3 times a day. 100 Each 2   • levonorgestrel (MIRENA) 20 MCG/24HR IUD 1 Each by Intrauterine route Once.     • atorvastatin (LIPITOR) 10 MG Tab TAKE 1 TABLET BY MOUTH EVERY DAY 90 Tab 4   • lisinopril-hydrochlorothiazide (PRINZIDE) 20-25 MG per tablet TAKE 1 TABLET BY MOUTH EVERY DAY 90 Tab 4     No current facility-administered medications for this visit.        Patient Active Problem List    Diagnosis Date Noted   • Overweight 07/31/2020   • Dry cough 12/12/2018   • Multiple thyroid nodules 07/20/2018   • Fatty liver 06/22/2018   • Chronic dryness of both eyes 12/27/2016   • Vitamin D deficiency 09/22/2016   • TED on CPAP 03/23/2016   • Hyperactivity (behavior)    • Microalbuminuria 06/11/2014   • Diabetes mellitus, type 2 (HCC)    • Essential hypertension    • Dyslipidemia        Family History   Problem Relation Age of Onset   • Hypertension Mother    • Hypertension Father    • Heart Attack Father    • Psychiatric Illness Paternal Uncle    • Heart Disease Maternal Grandmother    • Hypertension Maternal Grandmother    • Heart Disease Maternal Grandfather    • Hypertension Maternal Grandfather    • Diabetes Maternal Uncle    • Sleep Apnea Neg Hx        She  has a past medical history of Anxiety, Diabetes mellitus, type 2 (HCC), Dyslipidemia, HTN (hypertension), Obesity (5/22/2019), and Sleep apnea.  She  has a past surgical history that includes hysteroscopy novasure-2 (2010); abdominoplasty (1999); and tonsillectomy.       Objective:   Temp 37 °C (98.6 °F) (Temporal) Comment: Per patient  Ht 1.6 m (5' 3\")   Wt 76.7 kg (169 lb) Comment: Per patient  BMI 29.94 kg/m²     Physical Exam:    Constitutional: Alert, no distress, well-groomed.  Skin: No rashes in visible areas.  Eye: Round. Conjunctiva clear, lids normal. No icterus.   ENMT: Lips pink without lesions, good dentition, moist mucous membranes. Phonation normal.  Neck: No masses, no thyromegaly. Moves freely without " pain.  CV: Pulse as reported by patient  Respiratory: Unlabored respiratory effort, no cough or audible wheeze  Psych: Alert and oriented x3, normal affect and mood.       Assessment and Plan:   The following treatment plan was discussed:     1. Type 2 diabetes mellitus without complication, without long-term current use of insulin (HCC)    2. Overweight    3. Dyslipidemia    4. Essential hypertension    Chronic ongoing medical conditions.  Counseled on diet, exercise, and lifestyle changes to help with cholesterol, weight, and diabetes.  No changes in medications deemed necessary at this time.  Counseled on follow-up in 4 to 6 months.  Continue metformin, empagliflozin, lisinopril/hydrochlorothiazide, atorvastatin.    Follow-up: No follow-ups on file.       Please note that this dictation was created using voice recognition software. I have worked with consultants from the vendor as well as technical experts from [employer] to optimize the interface. I have made every reasonable attempt to correct obvious errors, but I expect that there are errors of grammar and possibly content that I did not discover before finalizing the note.

## 2020-09-10 DIAGNOSIS — E11.9 TYPE 2 DIABETES MELLITUS WITHOUT COMPLICATION, WITHOUT LONG-TERM CURRENT USE OF INSULIN (HCC): ICD-10-CM

## 2020-09-10 NOTE — TELEPHONE ENCOUNTER
Received request via: Pharmacy    Was the patient seen in the last year in this department? Yes  LOV 07/31/2020    Does the patient have an active prescription (recently filled or refills available) for medication(s) requested? No

## 2020-10-07 ENCOUNTER — APPOINTMENT (OUTPATIENT)
Dept: PULMONOLOGY | Facility: HOSPICE | Age: 50
End: 2020-10-07
Payer: COMMERCIAL

## 2020-10-16 ENCOUNTER — OFFICE VISIT (OUTPATIENT)
Dept: SLEEP MEDICINE | Facility: MEDICAL CENTER | Age: 50
End: 2020-10-16
Payer: COMMERCIAL

## 2020-10-16 VITALS
OXYGEN SATURATION: 96 % | WEIGHT: 177 LBS | RESPIRATION RATE: 16 BRPM | HEART RATE: 76 BPM | HEIGHT: 63 IN | DIASTOLIC BLOOD PRESSURE: 80 MMHG | BODY MASS INDEX: 31.36 KG/M2 | SYSTOLIC BLOOD PRESSURE: 120 MMHG

## 2020-10-16 DIAGNOSIS — G47.33 OSA ON CPAP: ICD-10-CM

## 2020-10-16 PROCEDURE — 99213 OFFICE O/P EST LOW 20 MIN: CPT | Performed by: INTERNAL MEDICINE

## 2020-10-16 ASSESSMENT — FIBROSIS 4 INDEX: FIB4 SCORE: 0.67

## 2020-10-16 NOTE — PROGRESS NOTES
Chief Complaint   Patient presents with   • Apnea     Last Seen 09/30/19       HPI: This patient is a 50 y.o. female who returns for annual follow-up of severe TED.  She was diagnosed with sleep apnea in 2016 by Dr. Agarwal, with HST:37/h.  She has been on CPAP 11 cm of water since then.  Compliance card shows 100% CPAP usage for 8 hours nightly.  Average AHI 0.2.  Johanna oves herCPAP machine and notes improved energy level and resolution of brain fog.  Uses a SoClean unit.  Requires CPAP supplies.    Past Medical History:   Diagnosis Date   • Anxiety    • Diabetes mellitus, type 2 (HCC)    • Dyslipidemia    • HTN (hypertension)    • Obesity 5/22/2019   • Sleep apnea        Social History     Socioeconomic History   • Marital status:      Spouse name: Not on file   • Number of children: 2D   • Years of education: 2y college   • Highest education level: Not on file   Occupational History   • 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[employer]     Employer: OTHER   Social Needs   • Financial resource strain: Not on file   • Food insecurity     Worry: Not on file     Inability: Not on file   • Transportation needs     Medical: Not on file     Non-medical: Not on file   Tobacco Use   • Smoking status: Never Smoker   • Smokeless tobacco: Never Used   Substance and Sexual Activity   • Alcohol use: Yes     Alcohol/week: 4.2 - 8.4 oz     Types: 7 - 14 Glasses of wine per week     Frequency: 4 or more times a week     Drinks per session: 3 or 4     Binge frequency: Weekly   • Drug use: No   • Sexual activity: Yes     Partners: Male     Birth control/protection: I.U.D.     Comment: IUD Dec 2016   Lifestyle   • Physical activity     Days per week: Not on file     Minutes per session: Not on file   • Stress: Not on file   Relationships   • Social connections     Talks on phone: Not on file     Gets together: Not on file     Attends Restorationism service: Not on file     Active member of club or organization: Not on  file     Attends meetings of clubs or organizations: Not on file     Relationship status: Not on file   • Intimate partner violence     Fear of current or ex partner: Not on file     Emotionally abused: Not on file     Physically abused: Not on file     Forced sexual activity: Not on file   Other Topics Concern   • Not on file   Social History Narrative   • Not on file       Family History   Problem Relation Age of Onset   • Hypertension Mother    • Hypertension Father    • Heart Attack Father    • Psychiatric Illness Paternal Uncle    • Heart Disease Maternal Grandmother    • Hypertension Maternal Grandmother    • Heart Disease Maternal Grandfather    • Hypertension Maternal Grandfather    • Diabetes Maternal Uncle    • Sleep Apnea Neg Hx        Current Outpatient Medications on File Prior to Visit   Medication Sig Dispense Refill   • metformin (GLUCOPHAGE) 1000 MG tablet TAKE 1 TABLET BY MOUTH TWICE A DAY WITH MEALS (Patient taking differently: Take 1,000 mg by mouth every day.) 180 Tab 4   • JARDIANCE 25 MG Tab TAKE 1 TABLET BY MOUTH EVERY DAY 90 Tab 4   • atorvastatin (LIPITOR) 10 MG Tab TAKE 1 TABLET BY MOUTH EVERY DAY 90 Tab 4   • lisinopril-hydrochlorothiazide (PRINZIDE) 20-25 MG per tablet TAKE 1 TABLET BY MOUTH EVERY DAY 90 Tab 4   • tretinoin (RETIN-A) 0.05 % cream APPLY PEA SIZED AMOUNT TO FACE AT BEDTIME.  2   • Triamcinolone Acetonide (NASACORT AQ NA) Spray  in nose.     • Cholecalciferol (VITAMIN D PO) Take 5,000 Caps by mouth.     • Blood Glucose Monitoring Suppl Device Meter: Dispense Device of Insurance Preference. Sig. Use as directed for blood sugar monitoring. #1. NR. 1 Device 0   • Blood Glucose Monitoring Suppl SUPPLIES Misc Test strips order: Test strips for One Touch Ultra 2 meter. Sig: use twice daily and prn ssx high or low sugar #100 RF x 3 100 Each 3   • Lancets Misc 1 Each by Does not apply route 3 times a day. 100 Each 2   • levonorgestrel (MIRENA) 20 MCG/24HR IUD 1 Each by Intrauterine  "route Once.       No current facility-administered medications on file prior to visit.        Allergies: Patient has no known allergies.    ROS:   Constitutional: Denies fevers, chills, night sweats, fatigue or weight loss  Eyes: Denies vision loss, pain, drainage, double vision  Ears, Nose, Throat: Denies earache, difficulty hearing, tinnitus, nasal congestion, hoarseness  Cardiovascular: Denies chest pain, tightness, palpitations, orthopnea or edema  Respiratory: Denies shortness of breath, +cough, denies wheezing, hemoptysis  Sleep: Denies daytime sleepiness, snoring, apneas, insomnia, morning headaches  GI: Denies heartburn, dysphagia, nausea, abdominal pain, diarrhea or constipation  : Denies frequent urination, hematuria, discharge or painful urination  Musculoskeletal: Denies back pain, painful joints, sore muscles  Neurological: Denies weakness or headaches  Skin: No rashes    /80 (BP Location: Right arm, Patient Position: Sitting, BP Cuff Size: Adult)   Pulse 76   Resp 16   Ht 1.6 m (5' 3\")   Wt 80.3 kg (177 lb)   SpO2 96%     Physical Exam:  Appearance: Well-nourished, well-developed, in no acute distress  HEENT: Normocephalic, atraumatic, white sclera, PERRLA  Neck: No masses  Respiratory: no intercostal retractions or accessory muscle use  Lungs auscultation: No audible wheezing  Cardiovascular: No LE edema  Abdomen: Nondistended  Gait: Normal  Digits: No clubbing, cyanosis  Motor: No focal deficits  Orientation: Oriented to time, person and place    Diagnosis:  1. TED on CPAP  DME Mask and Supplies   2. BMI 35.0-35.9,adult         Plan:  The patient shows excellent compliance and response to CPAP therapy and is benefiting from treatment.  Her AHI is normal on CPAP: 11 cm of water.  Continue with treatment.    Prescription submitted to DME for CPAP supplies for the following year  Return in about 1 year (around 10/16/2021).      "

## 2021-01-21 ENCOUNTER — APPOINTMENT (RX ONLY)
Dept: URBAN - METROPOLITAN AREA CLINIC 22 | Facility: CLINIC | Age: 51
Setting detail: DERMATOLOGY
End: 2021-01-21

## 2021-01-21 DIAGNOSIS — Z71.89 OTHER SPECIFIED COUNSELING: ICD-10-CM

## 2021-01-21 DIAGNOSIS — L81.1 CHLOASMA: ICD-10-CM

## 2021-01-21 DIAGNOSIS — D22 MELANOCYTIC NEVI: ICD-10-CM

## 2021-01-21 DIAGNOSIS — L81.4 OTHER MELANIN HYPERPIGMENTATION: ICD-10-CM

## 2021-01-21 DIAGNOSIS — L82.1 OTHER SEBORRHEIC KERATOSIS: ICD-10-CM

## 2021-01-21 PROBLEM — D22.5 MELANOCYTIC NEVI OF TRUNK: Status: ACTIVE | Noted: 2021-01-21

## 2021-01-21 PROCEDURE — ? SUNSCREEN TREATMENT REGIMEN

## 2021-01-21 PROCEDURE — 99213 OFFICE O/P EST LOW 20 MIN: CPT

## 2021-01-21 PROCEDURE — ? COUNSELING

## 2021-01-21 PROCEDURE — ? TREATMENT REGIMEN

## 2021-01-21 PROCEDURE — ? PRESCRIPTION

## 2021-01-21 RX ORDER — AZELAIC ACID 0.2 G/G
CREAM CUTANEOUS
Qty: 50 | Refills: 1

## 2021-01-21 RX ORDER — FLUOCINOLONE ACETONIDE, HYDROQUINONE, AND TRETINOIN .1; 40; .5 MG/G; MG/G; MG/G
CREAM TOPICAL QHS
Qty: 1 | Refills: 1

## 2021-01-21 RX ORDER — TRETIONIN 0.5 MG/G
CREAM TOPICAL
Qty: 1 | Refills: 2

## 2021-01-21 ASSESSMENT — LOCATION DETAILED DESCRIPTION DERM
LOCATION DETAILED: RIGHT MEDIAL BREAST 2-3:00 REGION
LOCATION DETAILED: LEFT CENTRAL MALAR CHEEK
LOCATION DETAILED: RIGHT LATERAL MALAR CHEEK
LOCATION DETAILED: INFERIOR THORACIC SPINE
LOCATION DETAILED: INFERIOR MID FOREHEAD
LOCATION DETAILED: RIGHT VENTRAL PROXIMAL FOREARM
LOCATION DETAILED: LEFT VENTRAL PROXIMAL FOREARM
LOCATION DETAILED: LOWER STERNUM
LOCATION DETAILED: SUPERIOR THORACIC SPINE
LOCATION DETAILED: RIGHT MEDIAL FOREHEAD

## 2021-01-21 ASSESSMENT — LOCATION SIMPLE DESCRIPTION DERM
LOCATION SIMPLE: LEFT FOREARM
LOCATION SIMPLE: RIGHT FOREHEAD
LOCATION SIMPLE: CHEST
LOCATION SIMPLE: RIGHT CHEEK
LOCATION SIMPLE: UPPER BACK
LOCATION SIMPLE: RIGHT FOREARM
LOCATION SIMPLE: LEFT CHEEK
LOCATION SIMPLE: RIGHT BREAST
LOCATION SIMPLE: INFERIOR FOREHEAD

## 2021-01-21 ASSESSMENT — LOCATION ZONE DERM
LOCATION ZONE: TRUNK
LOCATION ZONE: FACE
LOCATION ZONE: ARM

## 2021-01-21 NOTE — PROCEDURE: COUNSELING
Detail Level: Generalized
Detail Level: Zone
Detail Level: Simple
Patient Specific Counseling (Will Not Stick From Patient To Patient): Discussed laser treatment and given Carli Alma card\\nWill plan on retin a when off Hydroquinone

## 2021-04-02 ENCOUNTER — PATIENT MESSAGE (OUTPATIENT)
Dept: MEDICAL GROUP | Facility: PHYSICIAN GROUP | Age: 51
End: 2021-04-02

## 2021-04-02 DIAGNOSIS — Z12.11 SCREEN FOR COLON CANCER: ICD-10-CM

## 2021-04-02 DIAGNOSIS — Z12.31 ENCOUNTER FOR SCREENING MAMMOGRAM FOR BREAST CANCER: ICD-10-CM

## 2021-04-02 DIAGNOSIS — E11.9 TYPE 2 DIABETES MELLITUS WITHOUT COMPLICATION, WITHOUT LONG-TERM CURRENT USE OF INSULIN (HCC): ICD-10-CM

## 2021-05-25 ENCOUNTER — HOSPITAL ENCOUNTER (OUTPATIENT)
Dept: LAB | Facility: MEDICAL CENTER | Age: 51
End: 2021-05-25
Attending: FAMILY MEDICINE
Payer: COMMERCIAL

## 2021-05-25 DIAGNOSIS — E11.9 TYPE 2 DIABETES MELLITUS WITHOUT COMPLICATION, WITHOUT LONG-TERM CURRENT USE OF INSULIN (HCC): ICD-10-CM

## 2021-05-25 LAB
ALBUMIN SERPL BCP-MCNC: 4.6 G/DL (ref 3.2–4.9)
ALBUMIN/GLOB SERPL: 1.6 G/DL
ALP SERPL-CCNC: 73 U/L (ref 30–99)
ALT SERPL-CCNC: 35 U/L (ref 2–50)
ANION GAP SERPL CALC-SCNC: 11 MMOL/L (ref 7–16)
AST SERPL-CCNC: 18 U/L (ref 12–45)
BASOPHILS # BLD AUTO: 0.6 % (ref 0–1.8)
BASOPHILS # BLD: 0.04 K/UL (ref 0–0.12)
BILIRUB SERPL-MCNC: 1.6 MG/DL (ref 0.1–1.5)
BUN SERPL-MCNC: 17 MG/DL (ref 8–22)
CALCIUM SERPL-MCNC: 10 MG/DL (ref 8.5–10.5)
CHLORIDE SERPL-SCNC: 99 MMOL/L (ref 96–112)
CHOLEST SERPL-MCNC: 134 MG/DL (ref 100–199)
CO2 SERPL-SCNC: 27 MMOL/L (ref 20–33)
CREAT SERPL-MCNC: 0.65 MG/DL (ref 0.5–1.4)
CREAT UR-MCNC: 125.18 MG/DL
EOSINOPHIL # BLD AUTO: 0.18 K/UL (ref 0–0.51)
EOSINOPHIL NFR BLD: 2.9 % (ref 0–6.9)
ERYTHROCYTE [DISTWIDTH] IN BLOOD BY AUTOMATED COUNT: 43.5 FL (ref 35.9–50)
EST. AVERAGE GLUCOSE BLD GHB EST-MCNC: 163 MG/DL
FASTING STATUS PATIENT QL REPORTED: NORMAL
GLOBULIN SER CALC-MCNC: 2.9 G/DL (ref 1.9–3.5)
GLUCOSE SERPL-MCNC: 147 MG/DL (ref 65–99)
HBA1C MFR BLD: 7.3 % (ref 4–5.6)
HCT VFR BLD AUTO: 44.4 % (ref 37–47)
HDLC SERPL-MCNC: 41 MG/DL
HGB BLD-MCNC: 14.5 G/DL (ref 12–16)
IMM GRANULOCYTES # BLD AUTO: 0.03 K/UL (ref 0–0.11)
IMM GRANULOCYTES NFR BLD AUTO: 0.5 % (ref 0–0.9)
LDLC SERPL CALC-MCNC: 71 MG/DL
LYMPHOCYTES # BLD AUTO: 1.25 K/UL (ref 1–4.8)
LYMPHOCYTES NFR BLD: 20.2 % (ref 22–41)
MCH RBC QN AUTO: 31 PG (ref 27–33)
MCHC RBC AUTO-ENTMCNC: 32.7 G/DL (ref 33.6–35)
MCV RBC AUTO: 95.1 FL (ref 81.4–97.8)
MICROALBUMIN UR-MCNC: <1.2 MG/DL
MICROALBUMIN/CREAT UR: NORMAL MG/G (ref 0–30)
MONOCYTES # BLD AUTO: 0.53 K/UL (ref 0–0.85)
MONOCYTES NFR BLD AUTO: 8.6 % (ref 0–13.4)
NEUTROPHILS # BLD AUTO: 4.15 K/UL (ref 2–7.15)
NEUTROPHILS NFR BLD: 67.2 % (ref 44–72)
NRBC # BLD AUTO: 0 K/UL
NRBC BLD-RTO: 0 /100 WBC
PLATELET # BLD AUTO: 215 K/UL (ref 164–446)
PMV BLD AUTO: 8.9 FL (ref 9–12.9)
POTASSIUM SERPL-SCNC: 4.4 MMOL/L (ref 3.6–5.5)
PROT SERPL-MCNC: 7.5 G/DL (ref 6–8.2)
RBC # BLD AUTO: 4.67 M/UL (ref 4.2–5.4)
SODIUM SERPL-SCNC: 137 MMOL/L (ref 135–145)
TRIGL SERPL-MCNC: 110 MG/DL (ref 0–149)
WBC # BLD AUTO: 6.2 K/UL (ref 4.8–10.8)

## 2021-05-25 PROCEDURE — 83036 HEMOGLOBIN GLYCOSYLATED A1C: CPT

## 2021-05-25 PROCEDURE — 80053 COMPREHEN METABOLIC PANEL: CPT

## 2021-05-25 PROCEDURE — 82043 UR ALBUMIN QUANTITATIVE: CPT

## 2021-05-25 PROCEDURE — 85025 COMPLETE CBC W/AUTO DIFF WBC: CPT

## 2021-05-25 PROCEDURE — 36415 COLL VENOUS BLD VENIPUNCTURE: CPT

## 2021-05-25 PROCEDURE — 82570 ASSAY OF URINE CREATININE: CPT

## 2021-05-25 PROCEDURE — 80061 LIPID PANEL: CPT

## 2021-08-13 DIAGNOSIS — I10 ESSENTIAL HYPERTENSION: ICD-10-CM

## 2021-08-13 RX ORDER — LISINOPRIL AND HYDROCHLOROTHIAZIDE 25; 20 MG/1; MG/1
TABLET ORAL
Qty: 90 TABLET | Refills: 0 | Status: SHIPPED | OUTPATIENT
Start: 2021-08-13 | End: 2021-11-18

## 2021-08-13 NOTE — TELEPHONE ENCOUNTER
Phone Number Called: [phone] (home)     I spoke to the patient regarding refill of her lisinopril-HCTZ and told her that as she hadn't been seen in over a year that future refills would require an appointment.  An appointment was made for the patient.

## 2021-08-13 NOTE — TELEPHONE ENCOUNTER
Received request via: Pharmacy    Was the patient seen in the last year in this department? No  Last office visit 7/31/2020    Does the patient have an active prescription (recently filled or refills available) for medication(s) requested? No

## 2021-08-14 DIAGNOSIS — E78.5 DYSLIPIDEMIA: ICD-10-CM

## 2021-08-16 RX ORDER — ATORVASTATIN CALCIUM 10 MG/1
TABLET, FILM COATED ORAL
Qty: 60 TABLET | Refills: 0 | Status: SHIPPED | OUTPATIENT
Start: 2021-08-16 | End: 2021-10-18

## 2021-08-16 NOTE — TELEPHONE ENCOUNTER
Received request via: Pharmacy    Was the patient seen in the last year in this department? No 07/31/20    Does the patient have an active prescription (recently filled or refills available) for medication(s) requested? No

## 2021-08-25 ENCOUNTER — HOSPITAL ENCOUNTER (OUTPATIENT)
Dept: RADIOLOGY | Facility: MEDICAL CENTER | Age: 51
End: 2021-08-25
Attending: OTOLARYNGOLOGY
Payer: COMMERCIAL

## 2021-08-25 DIAGNOSIS — E06.3 HASHIMOTO'S DISEASE: ICD-10-CM

## 2021-08-25 PROCEDURE — 76536 US EXAM OF HEAD AND NECK: CPT

## 2021-08-26 ENCOUNTER — HOSPITAL ENCOUNTER (OUTPATIENT)
Dept: LAB | Facility: MEDICAL CENTER | Age: 51
End: 2021-08-26
Attending: OTOLARYNGOLOGY
Payer: COMMERCIAL

## 2021-08-26 ENCOUNTER — OFFICE VISIT (OUTPATIENT)
Dept: MEDICAL GROUP | Facility: PHYSICIAN GROUP | Age: 51
End: 2021-08-26
Payer: COMMERCIAL

## 2021-08-26 VITALS
WEIGHT: 177 LBS | TEMPERATURE: 97.1 F | DIASTOLIC BLOOD PRESSURE: 70 MMHG | SYSTOLIC BLOOD PRESSURE: 132 MMHG | OXYGEN SATURATION: 96 % | BODY MASS INDEX: 31.36 KG/M2 | HEART RATE: 88 BPM | HEIGHT: 63 IN

## 2021-08-26 DIAGNOSIS — Z12.31 ENCOUNTER FOR SCREENING MAMMOGRAM FOR BREAST CANCER: ICD-10-CM

## 2021-08-26 DIAGNOSIS — I10 ESSENTIAL HYPERTENSION: ICD-10-CM

## 2021-08-26 DIAGNOSIS — E11.9 TYPE 2 DIABETES MELLITUS WITHOUT COMPLICATION, WITHOUT LONG-TERM CURRENT USE OF INSULIN (HCC): ICD-10-CM

## 2021-08-26 DIAGNOSIS — E66.9 OBESITY (BMI 30.0-34.9): ICD-10-CM

## 2021-08-26 DIAGNOSIS — E04.2 MULTIPLE THYROID NODULES: ICD-10-CM

## 2021-08-26 DIAGNOSIS — E78.5 DYSLIPIDEMIA: ICD-10-CM

## 2021-08-26 LAB
HBA1C MFR BLD: 7.2 % (ref 0–5.6)
INT CON NEG: NEGATIVE
INT CON POS: POSITIVE
T3FREE SERPL-MCNC: 3.18 PG/ML (ref 2–4.4)
T4 FREE SERPL-MCNC: 1.26 NG/DL (ref 0.93–1.7)
TSH SERPL DL<=0.005 MIU/L-ACNC: 1.14 UIU/ML (ref 0.38–5.33)

## 2021-08-26 PROCEDURE — 36415 COLL VENOUS BLD VENIPUNCTURE: CPT

## 2021-08-26 PROCEDURE — 84443 ASSAY THYROID STIM HORMONE: CPT

## 2021-08-26 PROCEDURE — 84439 ASSAY OF FREE THYROXINE: CPT

## 2021-08-26 PROCEDURE — 84481 FREE ASSAY (FT-3): CPT

## 2021-08-26 PROCEDURE — 83036 HEMOGLOBIN GLYCOSYLATED A1C: CPT | Performed by: FAMILY MEDICINE

## 2021-08-26 PROCEDURE — 99214 OFFICE O/P EST MOD 30 MIN: CPT | Performed by: FAMILY MEDICINE

## 2021-08-26 ASSESSMENT — FIBROSIS 4 INDEX: FIB4 SCORE: 0.74

## 2021-08-26 ASSESSMENT — PATIENT HEALTH QUESTIONNAIRE - PHQ9: CLINICAL INTERPRETATION OF PHQ2 SCORE: 0

## 2021-08-26 NOTE — PROGRESS NOTES
CC:Diagnoses of Type 2 diabetes mellitus without complication, without long-term current use of insulin (HCC), Multiple thyroid nodules, Dyslipidemia, Encounter for screening mammogram for breast cancer, Essential hypertension, and Obesity (BMI 30.0-34.9) were pertinent to this visit.      HISTORY OF PRESENT ILLNESS: Patient is a 51 y.o. female established patient who presents today to follow-up on multiple medical conditions.  Patient currently following with Dr. Najera for multiple thyroid nodules.  Patient had laboratory testing today and results are pending.  Patient also has upcoming colonoscopy with Dr. Nelson Christiansen.    Currently taking Metformin once daily at 1000 mg and Jardiance 25 mg once daily.  Hemoglobin A1c 7.2% today.  7.3% 3 months ago.  Counseled on increasing Metformin however patient has associated diarrhea with this medication and we discussed possibly transitioning to extended release Metformin to curb these side effects.  Patient will call our office when she is ready for that prescription.    Blood pressure currently moderately well controlled and currently taking lisinopril/hydrochlorothiazide 20/25 mg daily.    Continues to use CPAP at night for TED.    Continues to take atorvastatin 10 mg nightly.    Health Maintenance: Declines vaccinations today, is up-to-date on Covid vaccination.    No problem-specific Assessment & Plan notes found for this encounter.      Patient Active Problem List    Diagnosis Date Noted   • Overweight 07/31/2020   • Dry cough 12/12/2018   • Multiple thyroid nodules 07/20/2018   • Fatty liver 06/22/2018   • Chronic dryness of both eyes 12/27/2016   • Vitamin D deficiency 09/22/2016   • TED on CPAP 03/23/2016   • Hyperactivity (behavior)    • Microalbuminuria 06/11/2014   • Diabetes mellitus, type 2 (HCC)    • Essential hypertension    • Dyslipidemia       Allergies:Patient has no known allergies.    Current Outpatient Medications   Medication Sig Dispense Refill   •  metformin (GLUCOPHAGE) 1000 MG tablet Take 1 Tablet by mouth every morning AND 0.5 Tablets every evening. 135 Tablet 4   • atorvastatin (LIPITOR) 10 MG Tab TAKE 1 TABLET BY MOUTH EVERY DAY 60 Tablet 0   • lisinopril-hydrochlorothiazide (PRINZIDE) 20-25 MG per tablet TAKE 1 TABLET BY MOUTH EVERY DAY 90 Tablet 0   • JARDIANCE 25 MG Tab TAKE 1 TABLET BY MOUTH EVERY DAY 90 Tab 4   • tretinoin (RETIN-A) 0.05 % cream APPLY PEA SIZED AMOUNT TO FACE AT BEDTIME.  2   • Triamcinolone Acetonide (NASACORT AQ NA) Spray  in nose.     • Cholecalciferol (VITAMIN D PO) Take 5,000 Caps by mouth.     • Blood Glucose Monitoring Suppl Device Meter: Dispense Device of Insurance Preference. Sig. Use as directed for blood sugar monitoring. #1. NR. 1 Device 0   • Blood Glucose Monitoring Suppl SUPPLIES Misc Test strips order: Test strips for One Touch Ultra 2 meter. Sig: use twice daily and prn ssx high or low sugar #100 RF x 3 100 Each 3   • Lancets Misc 1 Each by Does not apply route 3 times a day. 100 Each 2   • levonorgestrel (MIRENA) 20 MCG/24HR IUD 1 Each by Intrauterine route Once.       No current facility-administered medications for this visit.       Social History     Tobacco Use   • Smoking status: Never Smoker   • Smokeless tobacco: Never Used   Vaping Use   • Vaping Use: Never used   Substance Use Topics   • Alcohol use: Yes     Alcohol/week: 4.2 - 8.4 oz     Types: 7 - 14 Glasses of wine per week   • Drug use: No     Social History     Social History Narrative   • Not on file       Family History   Problem Relation Age of Onset   • Hypertension Mother    • Hypertension Father    • Heart Attack Father    • Psychiatric Illness Paternal Uncle    • Heart Disease Maternal Grandmother    • Hypertension Maternal Grandmother    • Heart Disease Maternal Grandfather    • Hypertension Maternal Grandfather    • Diabetes Maternal Uncle    • Sleep Apnea Neg Hx        Review of Systems:    Constitutional: No Fevers, Chills  Eyes: No vision  "changes  ENT: No hearing changes  Resp: No Shortness of breath  CV: No Chest pain  GI: No Nausea/Vomiting  MSK: No weakness  Skin: No rashes  Neuro: No Headaches  Psych: No Suicidal ideations    All remaining systems reviewed and found to be negative, except as stated above.    Exam:    /70 (BP Location: Right arm, Patient Position: Sitting, BP Cuff Size: Adult)   Pulse 88   Temp 36.2 °C (97.1 °F) (Temporal)   Ht 1.6 m (5' 3\")   Wt 80.3 kg (177 lb)   SpO2 96%  Body mass index is 31.35 kg/m².    General:  Well nourished, well developed female in NAD  HENT: Atraumatic, normocephalic  EYES: Extraocular movements intact, pupils equal and reactive to light  NECK: Supple, FROM  CHEST: No deformities, Equal chest expansion  RESP: Unlabored, no stridor or audible wheeze  ABD: Non-Distended  Extremities: No Clubbing, Cyanosis, or Edema  Skin: Warm/dry, without rashes  Neuro: A/O x 4, CN 2-12 Grossly intact, Motor/sensory grossly intact  Psych: Normal behavior, normal affect      LABS: 5/25/2021 and 8/26/2021 hemoglobin A1c at 7.2%: Results reviewed and discussed with the patient, questions answered.      Assessment/Plan:  1. Type 2 diabetes mellitus without complication, without long-term current use of insulin (HCC)  Chronic uncontrolled medical condition.  Increasing Metformin to 1500 mg daily.  1000 mg in the morning and 500 mg at night.  - POCT  A1C  - metformin (GLUCOPHAGE) 1000 MG tablet; Take 1 Tablet by mouth every morning AND 0.5 Tablets every evening.  Dispense: 135 Tablet; Refill: 4    2. Multiple thyroid nodules  Continue follow-up with Dr. Najera    3. Dyslipidemia  Continue atorvastatin 10 mg nightly    4. Encounter for screening mammogram for breast cancer  - MA-SCREENING MAMMO BILAT W/TOMOSYNTHESIS W/CAD; Future    5. Essential hypertension  Continue lisinopril/hydrochlorothiazide 20/25 mg daily    6. Obesity (BMI 30.0-34.9)  Counseled on diet, exercise, and lifestyle changes to help with weight " loss which will help her diabetes as well.  - Patient identified as having weight management issue.  Appropriate orders and counseling given.      Return in about 3 months (around 11/26/2021) for Diabetes, A1c, Medication management, Vaccination.      Please note that this dictation was created using voice recognition software. I have worked with consultants from the vendor as well as technical experts from [facility] to optimize the interface. I have made every reasonable attempt to correct obvious errors, but I expect that there are errors of grammar and possibly content that I did not discover before finalizing the note.

## 2021-08-26 NOTE — LETTER
[facility]  Randy Tomas M.D.  [address]  [place] [address]  Fax: [phone]   Authorization for Release/Disclosure of   Protected Health Information   Name: DORA SHELDON : 1970 SSN: [id number]   Address: [address]  [place] [address] Phone:    [phone] (home) [phone] (work)   I authorize the entity listed below to release/disclose the PHI below to:   [facility]/Randy Tomas M.D. and Randy Tomas M.D.   Provider or Entity Name:  Nura Najera    Address   City, State, [facility]   Phone:      Fax:     Reason for request: continuity of care   Information to be released:    [  ] LAST COLONOSCOPY,  including any PATH REPORT and follow-up  [  ] LAST FIT/COLOGUARD RESULT [  ] LAST DEXA  [  ] LAST MAMMOGRAM  [  ] LAST PAP  [  ] LAST LABS [  ] RETINA EXAM REPORT  [  ] IMMUNIZATION RECORDS  [ xx ] Release all info      [  ] Check here and initial the line next to each item to release ALL health information INCLUDING  _____ Care and treatment for drug and / or alcohol abuse  _____ HIV testing, infection status, or AIDS  _____ Genetic Testing    DATES OF SERVICE OR TIME PERIOD TO BE DISCLOSED: _____________  I understand and acknowledge that:  * This Authorization may be revoked at any time by you in writing, except if your health information has already been used or disclosed.  * Your health information that will be used or disclosed as a result of you signing this authorization could be re-disclosed by the recipient. If this occurs, your re-disclosed health information may no longer be protected by State or Federal laws.  * You may refuse to sign this Authorization. Your refusal will not affect your ability to obtain treatment.  * This Authorization becomes effective upon signing and will  on (date) __________.      If no date is indicated, this Authorization will  one (1) year from the signature date.    Name: Dora Sheldon    Signature:   Date:     2021        PLEASE FAX REQUESTED RECORDS BACK TO: [phone]

## 2021-09-16 DIAGNOSIS — E11.9 TYPE 2 DIABETES MELLITUS WITHOUT COMPLICATION, WITHOUT LONG-TERM CURRENT USE OF INSULIN (HCC): ICD-10-CM

## 2021-09-16 RX ORDER — EMPAGLIFLOZIN 25 MG/1
TABLET, FILM COATED ORAL
Qty: 90 TABLET | Refills: 0 | Status: SHIPPED | OUTPATIENT
Start: 2021-09-16 | End: 2021-12-27

## 2021-09-16 NOTE — TELEPHONE ENCOUNTER
Requested Prescriptions     Pending Prescriptions Disp Refills   • JARDIANCE 25 MG Tab [Pharmacy Med Name: JARDIANCE 25 MG TABLET] 90 Tablet 0     Sig: TAKE 1 TABLET BY MOUTH EVERY DAY       DELPHINE Nguyen.

## 2021-10-16 DIAGNOSIS — E78.5 DYSLIPIDEMIA: ICD-10-CM

## 2021-10-18 RX ORDER — ATORVASTATIN CALCIUM 10 MG/1
TABLET, FILM COATED ORAL
Qty: 90 TABLET | Refills: 4 | Status: SHIPPED | OUTPATIENT
Start: 2021-10-18 | End: 2022-12-01 | Stop reason: SDUPTHER

## 2021-10-20 ENCOUNTER — OFFICE VISIT (OUTPATIENT)
Dept: SLEEP MEDICINE | Facility: MEDICAL CENTER | Age: 51
End: 2021-10-20
Payer: COMMERCIAL

## 2021-10-20 VITALS
RESPIRATION RATE: 16 BRPM | HEIGHT: 63 IN | SYSTOLIC BLOOD PRESSURE: 128 MMHG | DIASTOLIC BLOOD PRESSURE: 72 MMHG | BODY MASS INDEX: 31.17 KG/M2 | WEIGHT: 175.9 LBS | HEART RATE: 90 BPM | OXYGEN SATURATION: 95 %

## 2021-10-20 DIAGNOSIS — G47.33 OSA ON CPAP: ICD-10-CM

## 2021-10-20 PROCEDURE — 99213 OFFICE O/P EST LOW 20 MIN: CPT | Performed by: NURSE PRACTITIONER

## 2021-10-20 ASSESSMENT — FIBROSIS 4 INDEX: FIB4 SCORE: 0.74

## 2021-10-20 NOTE — PROGRESS NOTES
Chief Complaint   Patient presents with   • Follow-Up     TED       HPI:  Dora Sheldon is a 51 y.o. year old female here today for follow-up on severe TED.  Last visit on 10/16/2020 by . Diagnosed in 2016 with a home sleep study showing 37 events per hour.  She has been on CPAP at 11 cm/H2O since.    Patient states that she uses and benefits from her machine.  Bedtime habits include going to sleep at 9 PM and waking up between 1 and 2 AM for a glass of water.  She then falls back asleep and wakes up around 5 AM.  She states sleep latency is approximately 5 minutes.  She denies any symptoms associated with untreated sleep apnea including excessive daytime sleepiness, morning headaches, palpitations, concentration or memory problems.  She is currently using a ResMed device and states that she feels there is problems with secure fit of water chamber into machine and it causes rattling noises.    Compliance report was reviewed and does show 100% usage with an average time of 7 hours and 35 minutes and a resultant AHI of 0.3 with no evidence of excessive leakage on compliance.      ROS: As per HPI and otherwise negative if not stated.    Past Medical History:   Diagnosis Date   • Anxiety    • Diabetes mellitus, type 2 (HCC)    • Dyslipidemia    • HTN (hypertension)    • Obesity 5/22/2019   • Sleep apnea        Past Surgical History:   Procedure Laterality Date   • HYSTEROSCOPY NOVASURE-2  2010    Dr. Sandoval   • ABDOMINOPLASTY  1999    Dr. Ricci   • TONSILLECTOMY         Family History   Problem Relation Age of Onset   • Hypertension Mother    • Hypertension Father    • Heart Attack Father    • Psychiatric Illness Paternal Uncle    • Heart Disease Maternal Grandmother    • Hypertension Maternal Grandmother    • Heart Disease Maternal Grandfather    • Hypertension Maternal Grandfather    • Diabetes Maternal Uncle    • Sleep Apnea Neg Hx        Allergies as of 10/20/2021   • (No Known Allergies)  "       Vitals:  /72 (BP Location: Left arm, Patient Position: Sitting, BP Cuff Size: Adult)   Pulse 90   Resp 16   Ht 1.6 m (5' 3\")   Wt 79.8 kg (175 lb 14.4 oz)   SpO2 95%     Current medications as of today   Current Outpatient Medications   Medication Sig Dispense Refill   • atorvastatin (LIPITOR) 10 MG Tab TAKE 1 TABLET BY MOUTH EVERY DAY 90 Tablet 4   • JARDIANCE 25 MG Tab TAKE 1 TABLET BY MOUTH EVERY DAY 90 Tablet 0   • metformin (GLUCOPHAGE) 1000 MG tablet Take 1 Tablet by mouth every morning AND 0.5 Tablets every evening. 135 Tablet 4   • lisinopril-hydrochlorothiazide (PRINZIDE) 20-25 MG per tablet TAKE 1 TABLET BY MOUTH EVERY DAY 90 Tablet 0   • tretinoin (RETIN-A) 0.05 % cream APPLY PEA SIZED AMOUNT TO FACE AT BEDTIME.  2   • Triamcinolone Acetonide (NASACORT AQ NA) Spray  in nose.     • Cholecalciferol (VITAMIN D PO) Take 5,000 Caps by mouth.     • Blood Glucose Monitoring Suppl Device Meter: Dispense Device of Insurance Preference. Sig. Use as directed for blood sugar monitoring. #1. NR. 1 Device 0   • Blood Glucose Monitoring Suppl SUPPLIES Misc Test strips order: Test strips for One Touch Ultra 2 meter. Sig: use twice daily and prn ssx high or low sugar #100 RF x 3 100 Each 3   • Lancets Misc 1 Each by Does not apply route 3 times a day. 100 Each 2   • levonorgestrel (MIRENA) 20 MCG/24HR IUD 1 Each by Intrauterine route Once.       No current facility-administered medications for this visit.         Physical Exam:   Gen:           Alert and oriented, No apparent distress. Mood and affect appropriate, normal interaction with examiner.  Eyes:          PERRL, EOM intact, sclere white, conjunctive moist.  Ears:          Not examined.   Hearing:     Grossly intact.  Nose:          Normal, no lesions or deformities.  Dentition:    Good dentition.  Oropharynx:   Tongue normal, posterior pharynx without erythema or exudate.  Neck:        Supple, trachea midline, no masses.  Respiratory Effort: No " intercostal retractions or use of accessory muscles.   Lung Auscultation:      Clear to auscultation bilaterally; no rales, rhonchi or wheezing.  CV:            Regular rate and rhythm. No murmurs, rubs or gallops.  Abd:           Not examined.   Lymphadenopathy: Not examined.  Gait and Station: Normal.  Digits and Nails: No clubbing, cyanosis, petechiae, or nodes.   Cranial Nerves: II-XII grossly intact.  Skin:        No rashes, lesions or ulcers noted.               Ext:           No cyanosis or edema.      Assessment:  1. TED on CPAP  DME CPAP       Immunizations:    COVID-19: 7/21/2021, 8/11/2021    Plan:  1.  Reviewed compliance with patient and 6 TED is adequately controlled on current pressure with a resultant AHI of 0.3.  Patient endorses difficulty with machine.  Water tank is not fitting securely in older device which is more than 5 years old.  At this time we will order a new CPAP machine at current pressure of 11 cm.  Compliance requirements were reinforced and patient verbalized understanding.  Follow-up will be in approximately 4 months for first compliance check on new machine.  Continue using machine nightly for optimal benefit.  Reach out via phone or MyChart with any questions or concerns before next appointment.    Please note that this dictation was created using voice recognition software. I have made every reasonable attempt to correct obvious errors, but it is possible there are errors of grammar and possibly content that I did not discover before finalizing the note.

## 2021-10-20 NOTE — PATIENT INSTRUCTIONS
1.  Reviewed compliance with patient and 6 TED is adequately controlled on current pressure with a resultant AHI of 0.3.  Patient endorses difficulty with machine.  Water tank is not fitting securely in older device which is more than 5 years old.  At this time we will order a new CPAP machine at current pressure of 11 cm.  Follow-up will be in approximately 4 months for first compliance check on new machine.  Continue using machine nightly for optimal benefit.  Reach out via phone or MyChart with any questions or concerns before next appointment.

## 2021-11-11 ENCOUNTER — TELEPHONE (OUTPATIENT)
Dept: SCHEDULING | Facility: IMAGING CENTER | Age: 51
End: 2021-11-11

## 2021-11-18 DIAGNOSIS — I10 ESSENTIAL HYPERTENSION: ICD-10-CM

## 2021-11-18 RX ORDER — LISINOPRIL AND HYDROCHLOROTHIAZIDE 25; 20 MG/1; MG/1
TABLET ORAL
Qty: 90 TABLET | Refills: 0 | Status: SHIPPED | OUTPATIENT
Start: 2021-11-18 | End: 2022-05-16 | Stop reason: SDUPTHER

## 2021-12-27 ENCOUNTER — OFFICE VISIT (OUTPATIENT)
Dept: MEDICAL GROUP | Facility: PHYSICIAN GROUP | Age: 51
End: 2021-12-27
Payer: COMMERCIAL

## 2021-12-27 VITALS
OXYGEN SATURATION: 97 % | TEMPERATURE: 97.4 F | WEIGHT: 174.2 LBS | DIASTOLIC BLOOD PRESSURE: 80 MMHG | HEART RATE: 81 BPM | BODY MASS INDEX: 30.87 KG/M2 | SYSTOLIC BLOOD PRESSURE: 126 MMHG | HEIGHT: 63 IN

## 2021-12-27 DIAGNOSIS — E78.5 DYSLIPIDEMIA: ICD-10-CM

## 2021-12-27 DIAGNOSIS — I10 ESSENTIAL HYPERTENSION: ICD-10-CM

## 2021-12-27 DIAGNOSIS — Z97.5 IUD (INTRAUTERINE DEVICE) IN PLACE: ICD-10-CM

## 2021-12-27 DIAGNOSIS — E11.9 TYPE 2 DIABETES MELLITUS WITHOUT COMPLICATION, WITHOUT LONG-TERM CURRENT USE OF INSULIN (HCC): ICD-10-CM

## 2021-12-27 DIAGNOSIS — Z76.89 ENCOUNTER TO ESTABLISH CARE: ICD-10-CM

## 2021-12-27 DIAGNOSIS — R05.8 DRY COUGH: ICD-10-CM

## 2021-12-27 DIAGNOSIS — E04.2 MULTIPLE THYROID NODULES: ICD-10-CM

## 2021-12-27 PROBLEM — E66.3 OVERWEIGHT: Status: RESOLVED | Noted: 2020-07-31 | Resolved: 2021-12-27

## 2021-12-27 PROCEDURE — 99214 OFFICE O/P EST MOD 30 MIN: CPT | Performed by: NURSE PRACTITIONER

## 2021-12-27 RX ORDER — EMPAGLIFLOZIN 25 MG/1
TABLET, FILM COATED ORAL
Qty: 60 TABLET | Refills: 0 | Status: SHIPPED | OUTPATIENT
Start: 2021-12-27 | End: 2022-01-31

## 2021-12-27 SDOH — ECONOMIC STABILITY: HOUSING INSECURITY

## 2021-12-27 SDOH — HEALTH STABILITY: PHYSICAL HEALTH: ON AVERAGE, HOW MANY DAYS PER WEEK DO YOU ENGAGE IN MODERATE TO STRENUOUS EXERCISE (LIKE A BRISK WALK)?: 3 DAYS

## 2021-12-27 SDOH — ECONOMIC STABILITY: TRANSPORTATION INSECURITY

## 2021-12-27 SDOH — HEALTH STABILITY: MENTAL HEALTH
STRESS IS WHEN SOMEONE FEELS TENSE, NERVOUS, ANXIOUS, OR CAN'T SLEEP AT NIGHT BECAUSE THEIR MIND IS TROUBLED. HOW STRESSED ARE YOU?: ONLY A LITTLE

## 2021-12-27 SDOH — HEALTH STABILITY: PHYSICAL HEALTH: ON AVERAGE, HOW MANY MINUTES DO YOU ENGAGE IN EXERCISE AT THIS LEVEL?: 50 MIN

## 2021-12-27 ASSESSMENT — SOCIAL DETERMINANTS OF HEALTH (SDOH)
DO YOU BELONG TO ANY CLUBS OR ORGANIZATIONS SUCH AS CHURCH GROUPS UNIONS, FRATERNAL OR ATHLETIC GROUPS, OR SCHOOL GROUPS?: NO
HOW OFTEN DO YOU HAVE A DRINK CONTAINING ALCOHOL: 4 OR MORE TIMES A WEEK
HOW MANY DRINKS CONTAINING ALCOHOL DO YOU HAVE ON A TYPICAL DAY WHEN YOU ARE DRINKING: 3 OR 4
HOW OFTEN DO YOU HAVE SIX OR MORE DRINKS ON ONE OCCASION: WEEKLY
DO YOU BELONG TO ANY CLUBS OR ORGANIZATIONS SUCH AS CHURCH GROUPS UNIONS, FRATERNAL OR ATHLETIC GROUPS, OR SCHOOL GROUPS?: NO

## 2021-12-27 ASSESSMENT — LIFESTYLE VARIABLES
HOW OFTEN DO YOU HAVE A DRINK CONTAINING ALCOHOL: 4 OR MORE TIMES A WEEK
HOW MANY STANDARD DRINKS CONTAINING ALCOHOL DO YOU HAVE ON A TYPICAL DAY: 3 OR 4
HOW OFTEN DO YOU HAVE SIX OR MORE DRINKS ON ONE OCCASION: WEEKLY

## 2021-12-27 ASSESSMENT — FIBROSIS 4 INDEX: FIB4 SCORE: 0.74

## 2021-12-27 NOTE — LETTER
[facility]  CORRY Herr  [address] [place] [address]  Fax: [phone]   Authorization for Release/Disclosure of   Protected Health Information   Name: DORA SHELDON : 1970 SSN: [id number]   Address: [address] [place], [place] [address] [address] Phone:    [phone] (home) [phone] (work)   I authorize the entity listed below to release/disclose the PHI below to:   [facility]/CORRY Herr and CORRY Herr   Provider or Entity Name:  Dr. Sandoval   Address   City, [facility]   Phone:      Fax:     Reason for request: continuity of care   Information to be released:    [  ] LAST COLONOSCOPY,  including any PATH REPORT and follow-up  [  ] LAST FIT/COLOGUARD RESULT [  ] LAST DEXA  [  ] LAST MAMMOGRAM  [  ] LAST PAP  [  ] LAST LABS [  ] RETINA EXAM REPORT  [  ] IMMUNIZATION RECORDS  [  ] Release all info      [  ] Check here and initial the line next to each item to release ALL health information INCLUDING  _____ Care and treatment for drug and / or alcohol abuse  _____ HIV testing, infection status, or AIDS  _____ Genetic Testing    DATES OF SERVICE OR TIME PERIOD TO BE DISCLOSED: _____________  I understand and acknowledge that:  * This Authorization may be revoked at any time by you in writing, except if your health information has already been used or disclosed.  * Your health information that will be used or disclosed as a result of you signing this authorization could be re-disclosed by the recipient. If this occurs, your re-disclosed health information may no longer be protected by State or Federal laws.  * You may refuse to sign this Authorization. Your refusal will not affect your ability to obtain treatment.  * This Authorization becomes effective upon signing and will  on (date) __________.      If no date is indicated, this Authorization will  one (1) year from the signature date.    Name: Dora Sheldon    Signature:    Date:     12/27/2021       PLEASE FAX REQUESTED RECORDS BACK TO: [phone]

## 2021-12-27 NOTE — ASSESSMENT & PLAN NOTE
She reports chronic. She has seen ENT and believes that her blood pressure medication is the cause.  She has tried to switch blood pressure medications but did not tolerate.  She tolerates her dry cough.

## 2021-12-27 NOTE — ASSESSMENT & PLAN NOTE
She is followed by Dr. Sandoval with GYN. She is due to have her Mirena removed. She states that her recent blood work showed she was in Menopause. It was agreed with her GYN that she would continue with her Mirena for the next year. We are requesting records.

## 2021-12-27 NOTE — PROGRESS NOTES
Subjective:     CC:  Diagnoses of Encounter to establish care, Type 2 diabetes mellitus without complication, without long-term current use of insulin (HCC), Essential hypertension, Dyslipidemia, Dry cough, Multiple thyroid nodules, and IUD (intrauterine device) in place were pertinent to this visit.    HISTORY OF THE PRESENT ILLNESS: Patient is a 51 y.o. female. This pleasant patient is here today to establish care and discuss the following. Her prior PCP was Dr. Randy Tomas.    IUD (intrauterine device) in place  She is followed by Dr. Sandoval with GYN. She is due to have her Mirena removed. She states that her recent blood work showed she was in Menopause. It was agreed with her GYN that she would continue with her Mirena for the next year. We are requesting records.     Type 2 diabetes mellitus, without long-term current use of insulin (HCC)  She is taking jardiance 25 mg daily and metformin 500 mg in am and 500 mg in the evening. She has not been taking her Metformin as prescribed.  She states that she was unable to tolerate the higher Metformin dose as it caused constipation.  She is checking her blood sugars at home.  She states that her morning fasting blood sugars range 145-180's. She reports increased stress at home/work that she believes is contributing to her elevated blood sugars.  She also admits that she is not exercising or watching her diet as closely as she should.    Dry cough  She reports chronic. She has seen ENT and believes that her blood pressure medication is the cause.  She has tried to switch blood pressure medications but did not tolerate.  She tolerates her dry cough.    Multiple thyroid nodules  She is currently followed by Dr. Najera, ENT, for thyroid nodules.  She had recent appointment last week.     Essential hypertension  Her blood pressure is at goal today.  She continues with her lisinopril-hydrochlorothiazide 20-25 mg daily.  She does not check her blood pressure at home.   She states that she can feel when her blood pressure is elevated.    Dyslipidemia  She continues with her atorvastatin 10 mg daily.  She is tolerating the medication.  Her last labs in May were at goal.      Allergies: Patient has no known allergies.    Current Outpatient Medications Ordered in Epic   Medication Sig Dispense Refill   • JARDIANCE 25 MG Tab TAKE 1 TABLET BY MOUTH EVERY DAY 60 Tablet 0   • lisinopril-hydrochlorothiazide (PRINZIDE) 20-25 MG per tablet TAKE 1 TABLET BY MOUTH EVERY DAY 90 Tablet 0   • atorvastatin (LIPITOR) 10 MG Tab TAKE 1 TABLET BY MOUTH EVERY DAY 90 Tablet 4   • metformin (GLUCOPHAGE) 1000 MG tablet Take 1 Tablet by mouth every morning AND 0.5 Tablets every evening. 135 Tablet 4   • tretinoin (RETIN-A) 0.05 % cream APPLY PEA SIZED AMOUNT TO FACE AT BEDTIME.  2   • Triamcinolone Acetonide (NASACORT AQ NA) Spray  in nose.     • Cholecalciferol (VITAMIN D PO) Take 5,000 Caps by mouth.     • Blood Glucose Monitoring Suppl Device Meter: Dispense Device of Insurance Preference. Sig. Use as directed for blood sugar monitoring. #1. NR. 1 Device 0   • Blood Glucose Monitoring Suppl SUPPLIES Misc Test strips order: Test strips for One Touch Ultra 2 meter. Sig: use twice daily and prn ssx high or low sugar #100 RF x 3 100 Each 3   • Lancets Misc 1 Each by Does not apply route 3 times a day. 100 Each 2   • levonorgestrel (MIRENA) 20 MCG/24HR IUD 1 Each by Intrauterine route Once.       No current Epic-ordered facility-administered medications on file.       Past Medical History:   Diagnosis Date   • Anxiety    • Diabetes mellitus, type 2 (HCC)    • Dyslipidemia    • HTN (hypertension)    • Hypertension    • Obesity 5/22/2019   • Sleep apnea        Past Surgical History:   Procedure Laterality Date   • HYSTEROSCOPY NOVASURE-2  2010    Dr. Sandoval   • ABDOMINOPLASTY  1999    Dr. Ricci   • TONSILLECTOMY         Social History     Tobacco Use   • Smoking status: Never Smoker   • Smokeless tobacco:  "Never Used   Vaping Use   • Vaping Use: Never used   Substance Use Topics   • Alcohol use: Yes     Alcohol/week: 4.2 - 8.4 oz     Types: 7 - 14 Glasses of wine per week   • Drug use: No       Social History     Social History Narrative   • Not on file       Family History   Problem Relation Age of Onset   • Hypertension Mother    • Hypertension Father    • Heart Attack Father    • Psychiatric Illness Paternal Uncle    • Heart Disease Maternal Grandmother    • Hypertension Maternal Grandmother    • Heart Disease Maternal Grandfather    • Hypertension Maternal Grandfather    • Diabetes Maternal Uncle    • Sleep Apnea Neg Hx        Health Maintenance: Reviewed.  We are requesting her mammogram and recent lab results from her GYN.        Objective:     Vital signs reviewed   Exam: /80 (BP Location: Right arm, Patient Position: Sitting, BP Cuff Size: Adult)   Pulse 81   Temp 36.3 °C (97.4 °F) (Temporal)   Ht 1.6 m (5' 3\")   Wt 79 kg (174 lb 3.2 oz)   SpO2 97%  Body mass index is 30.86 kg/m².    Gen: Alert and oriented, No apparent distress.  Lungs: Normal effort, CTA bilaterally, no wheezes, rhonchi, or rales.    CV: Regular rate and rhythm. No murmurs, rubs, or gallops.  Ext: No clubbing, cyanosis, edema      Assessment & Plan:   51 y.o. female with the following -    1. Encounter to establish care  Acute uncomplicated problem.  Care established today.  We are requesting records from her GYN today.  She reports that she had a mammogram with her GYN.    2. Type 2 diabetes mellitus without complication, without long-term current use of insulin (HCC)  Chronic stable problem.  Reinforced diet and lifestyle modifications.  She will return in 1 month for repeat A1c and her monofilament exam.  For now she will continue with her Jardiance 25 mg daily and Metformin 500 mg twice daily.    3. Essential hypertension  Chronic stable problem.  Her blood pressure is at goal today.  She will continue with her " lisinopril-hydrochlorothiazide 20-25 mg daily.  Blood pressure is at goal today.    4. Dyslipidemia  Chronic stable problem.  She will continue with her atorvastatin 10 mg daily.  She is up-to-date on her labs.  Continue with diet and lifestyle modifications.    5. Dry cough  Chronic stable problem.  No acute complaints from patient today.  She will continue with her lisinopril-hydrochlorothiazide.    6. Multiple thyroid nodules  Chronic stable problem.  Continue follow-up with Dr. Najera.  No acute complaints today.    7. IUD (intrauterine device) in place  Chronic stable problem.  Continue follow-up with GYN.  We are requesting her recent labs.      Return in about 4 weeks (around 1/24/2022) for Diabetes, A1C.    Please note that this dictation was created using voice recognition software. I have made every reasonable attempt to correct obvious errors, but I expect that there are errors of grammar and possibly content that I did not discover before finalizing the note.

## 2021-12-27 NOTE — TELEPHONE ENCOUNTER
Requested Prescriptions     Signed Prescriptions Disp Refills   • JARDIANCE 25 MG Tab 60 Tablet 0     Sig: TAKE 1 TABLET BY MOUTH EVERY DAY     Authorizing Provider: CONNOR ELLIOTT A.P.R.N.

## 2021-12-27 NOTE — ASSESSMENT & PLAN NOTE
Her blood pressure is at goal today.  She continues with her lisinopril-hydrochlorothiazide 20-25 mg daily.  She does not check her blood pressure at home.  She states that she can feel when her blood pressure is elevated.

## 2021-12-27 NOTE — ASSESSMENT & PLAN NOTE
She continues with her atorvastatin 10 mg daily.  She is tolerating the medication.  Her last labs in May were at goal.

## 2021-12-27 NOTE — ASSESSMENT & PLAN NOTE
She is currently followed by Dr. Najera, ENT, for thyroid nodules.  She had recent appointment last week.

## 2021-12-27 NOTE — ASSESSMENT & PLAN NOTE
She is taking jardiance 25 mg daily and metformin 500 mg in am and 500 mg in the evening. She has not been taking her Metformin as prescribed.  She states that she was unable to tolerate the higher Metformin dose as it caused constipation.  She is checking her blood sugars at home.  She states that her morning fasting blood sugars range 145-180's. She reports increased stress at home/work that she believes is contributing to her elevated blood sugars.  She also admits that she is not exercising or watching her diet as closely as she should.

## 2022-01-25 ENCOUNTER — APPOINTMENT (RX ONLY)
Dept: URBAN - METROPOLITAN AREA CLINIC 22 | Facility: CLINIC | Age: 52
Setting detail: DERMATOLOGY
End: 2022-01-25

## 2022-01-25 ENCOUNTER — RX ONLY (OUTPATIENT)
Age: 52
Setting detail: RX ONLY
End: 2022-01-25

## 2022-01-25 DIAGNOSIS — L82.1 OTHER SEBORRHEIC KERATOSIS: ICD-10-CM

## 2022-01-25 DIAGNOSIS — Z71.89 OTHER SPECIFIED COUNSELING: ICD-10-CM

## 2022-01-25 DIAGNOSIS — L81.4 OTHER MELANIN HYPERPIGMENTATION: ICD-10-CM

## 2022-01-25 DIAGNOSIS — D22 MELANOCYTIC NEVI: ICD-10-CM

## 2022-01-25 DIAGNOSIS — L81.1 CHLOASMA: ICD-10-CM

## 2022-01-25 PROBLEM — D23.5 OTHER BENIGN NEOPLASM OF SKIN OF TRUNK: Status: ACTIVE | Noted: 2022-01-25

## 2022-01-25 PROBLEM — D22.5 MELANOCYTIC NEVI OF TRUNK: Status: ACTIVE | Noted: 2022-01-25

## 2022-01-25 PROCEDURE — ? TREATMENT REGIMEN

## 2022-01-25 PROCEDURE — ? SUNSCREEN TREATMENT REGIMEN

## 2022-01-25 PROCEDURE — 99213 OFFICE O/P EST LOW 20 MIN: CPT

## 2022-01-25 PROCEDURE — ? COUNSELING

## 2022-01-25 PROCEDURE — ? PRESCRIPTION

## 2022-01-25 RX ORDER — TRETIONIN 0.5 MG/G
1 CREAM TOPICAL DAILY
Qty: 45 | Refills: 2

## 2022-01-25 RX ORDER — AZELAIC ACID 0.2 G/G
CREAM CUTANEOUS
Qty: 50 | Refills: 1

## 2022-01-25 RX ORDER — FLUOCINOLONE ACETONIDE, HYDROQUINONE, AND TRETINOIN .1; 40; .5 MG/G; MG/G; MG/G
1 CREAM TOPICAL QHS
Qty: 30 | Refills: 0

## 2022-01-25 RX ORDER — AZELAIC ACID 0.2 G/G
CREAM CUTANEOUS
Qty: 50 | Refills: 1 | Status: ERX

## 2022-01-25 RX ORDER — FLUOCINOLONE ACETONIDE, HYDROQUINONE, AND TRETINOIN .1; 40; .5 MG/G; MG/G; MG/G
1 CREAM TOPICAL QHS
Qty: 30 | Refills: 0 | Status: ERX

## 2022-01-25 ASSESSMENT — LOCATION SIMPLE DESCRIPTION DERM
LOCATION SIMPLE: LEFT CHEEK
LOCATION SIMPLE: LEFT FOREARM
LOCATION SIMPLE: INFERIOR FOREHEAD
LOCATION SIMPLE: UPPER BACK
LOCATION SIMPLE: RIGHT FOREHEAD
LOCATION SIMPLE: RIGHT FOREARM
LOCATION SIMPLE: RIGHT CHEEK

## 2022-01-25 ASSESSMENT — LOCATION ZONE DERM
LOCATION ZONE: TRUNK
LOCATION ZONE: ARM
LOCATION ZONE: FACE

## 2022-01-25 ASSESSMENT — LOCATION DETAILED DESCRIPTION DERM
LOCATION DETAILED: INFERIOR MID FOREHEAD
LOCATION DETAILED: INFERIOR THORACIC SPINE
LOCATION DETAILED: RIGHT MEDIAL FOREHEAD
LOCATION DETAILED: RIGHT LATERAL MALAR CHEEK
LOCATION DETAILED: SUPERIOR THORACIC SPINE
LOCATION DETAILED: LEFT VENTRAL PROXIMAL FOREARM
LOCATION DETAILED: LEFT CENTRAL MALAR CHEEK
LOCATION DETAILED: RIGHT VENTRAL PROXIMAL FOREARM

## 2022-01-25 NOTE — PROCEDURE: COUNSELING
Detail Level: Generalized
Detail Level: Zone
Detail Level: Simple
Patient Specific Counseling (Will Not Stick From Patient To Patient): Discussed laser treatment and given Carli Alma card\\nWill plan on retin a when off Hydroquinone
Detail Level: Detailed

## 2022-01-31 DIAGNOSIS — E11.9 TYPE 2 DIABETES MELLITUS WITHOUT COMPLICATION, WITHOUT LONG-TERM CURRENT USE OF INSULIN (HCC): ICD-10-CM

## 2022-01-31 RX ORDER — EMPAGLIFLOZIN 25 MG/1
TABLET, FILM COATED ORAL
Qty: 60 TABLET | Refills: 0 | Status: SHIPPED | OUTPATIENT
Start: 2022-01-31 | End: 2022-04-11

## 2022-02-22 ENCOUNTER — APPOINTMENT (OUTPATIENT)
Dept: SLEEP MEDICINE | Facility: MEDICAL CENTER | Age: 52
End: 2022-02-22
Payer: COMMERCIAL

## 2022-04-11 DIAGNOSIS — E11.9 TYPE 2 DIABETES MELLITUS WITHOUT COMPLICATION, WITHOUT LONG-TERM CURRENT USE OF INSULIN (HCC): ICD-10-CM

## 2022-04-11 RX ORDER — EMPAGLIFLOZIN 25 MG/1
1 TABLET, FILM COATED ORAL
Qty: 60 TABLET | Refills: 0 | Status: SHIPPED | OUTPATIENT
Start: 2022-04-11 | End: 2022-05-16 | Stop reason: SDUPTHER

## 2022-04-11 NOTE — TELEPHONE ENCOUNTER
Requested Prescriptions     Signed Prescriptions Disp Refills   • Empagliflozin (JARDIANCE) 25 MG Tab 60 Tablet 0     Sig: Take 1 Tablet by mouth every day. Due for appointment with PCP.     Authorizing Provider: CONNOR ELLIOTT A.P.R.N.

## 2022-04-21 ENCOUNTER — OFFICE VISIT (OUTPATIENT)
Dept: SLEEP MEDICINE | Facility: MEDICAL CENTER | Age: 52
End: 2022-04-21
Payer: COMMERCIAL

## 2022-04-21 VITALS
HEART RATE: 77 BPM | DIASTOLIC BLOOD PRESSURE: 80 MMHG | SYSTOLIC BLOOD PRESSURE: 136 MMHG | OXYGEN SATURATION: 97 % | HEIGHT: 63 IN | RESPIRATION RATE: 16 BRPM | WEIGHT: 179.4 LBS | BODY MASS INDEX: 31.79 KG/M2

## 2022-04-21 DIAGNOSIS — G47.33 OSA ON CPAP: ICD-10-CM

## 2022-04-21 PROCEDURE — 99213 OFFICE O/P EST LOW 20 MIN: CPT | Performed by: NURSE PRACTITIONER

## 2022-04-21 ASSESSMENT — FIBROSIS 4 INDEX: FIB4 SCORE: 0.74

## 2022-04-21 ASSESSMENT — PATIENT HEALTH QUESTIONNAIRE - PHQ9: CLINICAL INTERPRETATION OF PHQ2 SCORE: 0

## 2022-04-21 NOTE — PROGRESS NOTES
Chief Complaint   Patient presents with   • Apnea       HPI:  Dora Sheldon is a 51 y.o. year old female here today for follow-up on TED.  Last seen 10/20/2021 by me. Diagnosed in 2016 with a home sleep study showing 37 events per hour.  She has been on CPAP at 11 cm/H2O since.  At last visit I ordered a new CPAP device as her previous was greater than 5 years old.  She is currently using the device and benefiting from this.  She denies any difficulty with mask fit or pressures at this time.  She denies any excessive daytime sleepiness, morning headaches, palpitations, concentration or memory problems.    Compliance review shows 100% use with an average time of 7 hours and 33 minutes and a resultant AHI of 0.1 with no evidence of excessive leakage.  Bedtime habits include going to sleep around 9 PM and waking up around 5 AM.  She denies any difficulty falling or staying asleep.  She has 1 episode of nocturnal awakening for bathroom break.    ROS: As per HPI and otherwise negative if not stated.    Past Medical History:   Diagnosis Date   • Anxiety    • Diabetes mellitus, type 2 (HCC)    • Dyslipidemia    • HTN (hypertension)    • Hypertension    • Obesity 5/22/2019   • Sleep apnea        Past Surgical History:   Procedure Laterality Date   • HYSTEROSCOPY NOVASURE-2  2010    Dr. Sandoval   • ABDOMINOPLASTY  1999    Dr. Ricci   • TONSILLECTOMY         Family History   Problem Relation Age of Onset   • Hypertension Mother    • Hypertension Father    • Heart Attack Father    • Psychiatric Illness Paternal Uncle    • Heart Disease Maternal Grandmother    • Hypertension Maternal Grandmother    • Heart Disease Maternal Grandfather    • Hypertension Maternal Grandfather    • Diabetes Maternal Uncle    • Sleep Apnea Neg Hx        Allergies as of 04/21/2022   • (No Known Allergies)        Vitals:  There were no vitals taken for this visit.    Current medications as of today   Current Outpatient Medications   Medication Sig  Dispense Refill   • Empagliflozin (JARDIANCE) 25 MG Tab Take 1 Tablet by mouth every day. Due for appointment with PCP. 60 Tablet 0   • lisinopril-hydrochlorothiazide (PRINZIDE) 20-25 MG per tablet TAKE 1 TABLET BY MOUTH EVERY DAY 90 Tablet 0   • atorvastatin (LIPITOR) 10 MG Tab TAKE 1 TABLET BY MOUTH EVERY DAY 90 Tablet 4   • metformin (GLUCOPHAGE) 1000 MG tablet Take 1 Tablet by mouth every morning AND 0.5 Tablets every evening. 135 Tablet 4   • tretinoin (RETIN-A) 0.05 % cream APPLY PEA SIZED AMOUNT TO FACE AT BEDTIME.  2   • Triamcinolone Acetonide (NASACORT AQ NA) Spray  in nose.     • Cholecalciferol (VITAMIN D PO) Take 5,000 Caps by mouth.     • Blood Glucose Monitoring Suppl Device Meter: Dispense Device of Insurance Preference. Sig. Use as directed for blood sugar monitoring. #1. NR. 1 Device 0   • Blood Glucose Monitoring Suppl SUPPLIES Misc Test strips order: Test strips for One Touch Ultra 2 meter. Sig: use twice daily and prn ssx high or low sugar #100 RF x 3 100 Each 3   • Lancets Misc 1 Each by Does not apply route 3 times a day. 100 Each 2   • levonorgestrel (MIRENA) 20 MCG/24HR IUD 1 Each by Intrauterine route Once.       No current facility-administered medications for this visit.         Physical Exam:   Gen:           Alert and oriented, No apparent distress. Mood and affect appropriate, normal interaction with examiner.  Eyes:          PERRL, EOM intact, sclere white, conjunctive moist.  Ears:          Not examined.   Hearing:     Grossly intact.  Nose:          Normal, no lesions or deformities.  Dentition:    Good dentition.  Oropharynx:   Tongue normal, posterior pharynx without erythema or exudate.  Neck:        Supple, trachea midline, no masses.  Respiratory Effort: No intercostal retractions or use of accessory muscles.   Lung Auscultation:      Clear to auscultation bilaterally; no rales, rhonchi or wheezing.  CV:            Regular rate and rhythm. No murmurs, rubs or gallops.  Abd:            Not examined.   Lymphadenopathy: Not examined.  Gait and Station: Normal.  Digits and Nails: No clubbing, cyanosis, petechiae, or nodes.   Cranial Nerves: II-XII grossly intact.  Skin:        No rashes, lesions or ulcers noted.               Ext:           No cyanosis or edema.      Assessment:  1. TED on CPAP         Plan:  1.  Patient is using and benefiting from CPAP therapy at this time.  She denies any difficulty with mask fit or pressures.  Compliance review shows adequate use and control of TED with a resultant AHI of 0.1.  Follow-up will be in 6 months for annual sleep apnea visit for reorder of mask and supplies.  Reach out via phone or MyChart with any questions or concerns before next appointment.    Please note that this dictation was created using voice recognition software. I have made every reasonable attempt to correct obvious errors, but it is possible there are errors of grammar and possibly content that I did not discover before finalizing the note.

## 2022-05-16 ENCOUNTER — OFFICE VISIT (OUTPATIENT)
Dept: MEDICAL GROUP | Facility: PHYSICIAN GROUP | Age: 52
End: 2022-05-16
Payer: COMMERCIAL

## 2022-05-16 VITALS
SYSTOLIC BLOOD PRESSURE: 130 MMHG | HEART RATE: 80 BPM | TEMPERATURE: 98.3 F | OXYGEN SATURATION: 98 % | WEIGHT: 176 LBS | HEIGHT: 63 IN | BODY MASS INDEX: 31.18 KG/M2 | DIASTOLIC BLOOD PRESSURE: 70 MMHG

## 2022-05-16 DIAGNOSIS — E11.9 TYPE 2 DIABETES MELLITUS WITHOUT COMPLICATION, WITHOUT LONG-TERM CURRENT USE OF INSULIN (HCC): ICD-10-CM

## 2022-05-16 DIAGNOSIS — E78.5 DYSLIPIDEMIA: ICD-10-CM

## 2022-05-16 DIAGNOSIS — I10 ESSENTIAL HYPERTENSION: ICD-10-CM

## 2022-05-16 LAB
HBA1C MFR BLD: 7.3 % (ref 0–5.6)
INT CON NEG: ABNORMAL
INT CON POS: ABNORMAL

## 2022-05-16 PROCEDURE — 99214 OFFICE O/P EST MOD 30 MIN: CPT | Performed by: NURSE PRACTITIONER

## 2022-05-16 PROCEDURE — 83036 HEMOGLOBIN GLYCOSYLATED A1C: CPT | Performed by: NURSE PRACTITIONER

## 2022-05-16 RX ORDER — EMPAGLIFLOZIN 25 MG/1
1 TABLET, FILM COATED ORAL
Qty: 180 TABLET | Refills: 1 | Status: SHIPPED | OUTPATIENT
Start: 2022-05-16 | End: 2023-04-17

## 2022-05-16 RX ORDER — METFORMIN HYDROCHLORIDE 500 MG/1
1000 TABLET, EXTENDED RELEASE ORAL DAILY
Qty: 90 TABLET | Refills: 1 | Status: SHIPPED | OUTPATIENT
Start: 2022-05-16 | End: 2022-06-30

## 2022-05-16 RX ORDER — LISINOPRIL AND HYDROCHLOROTHIAZIDE 25; 20 MG/1; MG/1
1 TABLET ORAL
Qty: 90 TABLET | Refills: 1 | Status: SHIPPED | OUTPATIENT
Start: 2022-05-16 | End: 2022-11-29

## 2022-05-16 ASSESSMENT — FIBROSIS 4 INDEX: FIB4 SCORE: 0.74

## 2022-05-16 NOTE — LETTER
[facility]  CORRY Herr  [address]  [facility] [address]  Fax: [phone]   Authorization for Release/Disclosure of   Protected Health Information   Name: DORA TAMEZ : 1970 SSN: [id number]   Address: [address] [place] [address] [place] [address] Phone:    [phone] (home) [phone] (work)   I authorize the entity listed below to release/disclose the PHI below to:   [facility]/CORRY Herr and CORRY Herr   Provider or Entity Name:  [facility]   Address   City, State, [place], [place]  Phone:      Fax:     Reason for request: continuity of care   Information to be released:    [  ] LAST COLONOSCOPY,  including any PATH REPORT and follow-up  [  ] LAST FIT/COLOGUARD RESULT [  ] LAST DEXA  [  ] LAST MAMMOGRAM  [  ] LAST PAP  [  ] LAST LABS [x] RETINA EXAM REPORT  [  ] IMMUNIZATION RECORDS  [  ] Release all info      [  ] Check here and initial the line next to each item to release ALL health information INCLUDING  _____ Care and treatment for drug and / or alcohol abuse  _____ HIV testing, infection status, or AIDS  _____ Genetic Testing    DATES OF SERVICE OR TIME PERIOD TO BE DISCLOSED: _____________  I understand and acknowledge that:  * This Authorization may be revoked at any time by you in writing, except if your health information has already been used or disclosed.  * Your health information that will be used or disclosed as a result of you signing this authorization could be re-disclosed by the recipient. If this occurs, your re-disclosed health information may no longer be protected by State or Federal laws.  * You may refuse to sign this Authorization. Your refusal will not affect your ability to obtain treatment.  * This Authorization becomes effective upon signing and will  on (date) __________.      If no date is indicated, this Authorization will  one (1) year from the signature date.    Name: Dora Ulrich  Pito    Signature:   Date:     5/16/2022       PLEASE FAX REQUESTED RECORDS BACK TO: [phone]

## 2022-05-16 NOTE — PROGRESS NOTES
Subjective:     CC: diabetes     HPI:   Dora presents today with the following:    Type 2 diabetes mellitus, without long-term current use of insulin (HCC)  She is due for A1c today, last 7.2%.  She continues with her Jardiance 25 mg daily and metformin 1000 mg in the morning and has not been taking 500 mg in the evening.  She reports her stomach does not tolerate the twice a day dosing.  She is on atorvastatin. She has not been walking as much with the cold weather. She has not been taking her calcium that her GYN had wanted her to take. She does drink red wine.     Essential hypertension  Her blood pressure is at goal today.  She continues with her lisinopril-hydrochlorothiazide 20-25 mg daily.  She does need a medication refill today.  She is due for updated labs.    Dyslipidemia  She continues with atorvastatin 10 mg every day.  She is due for updated labs.  She has not been exercising as much recently but plans to increase her exercise with the warm weather.      Past Medical History:   Diagnosis Date   • Anxiety    • Diabetes mellitus, type 2 (HCC)    • Dyslipidemia    • HTN (hypertension)    • Hypertension    • Obesity 5/22/2019   • Sleep apnea        Social History     Tobacco Use   • Smoking status: Never Smoker   • Smokeless tobacco: Never Used   Vaping Use   • Vaping Use: Never used   Substance Use Topics   • Alcohol use: Yes     Alcohol/week: 4.2 - 8.4 oz     Types: 7 - 14 Glasses of wine per week   • Drug use: No       Current Outpatient Medications Ordered in Epic   Medication Sig Dispense Refill   • metFORMIN ER (GLUCOPHAGE XR) 500 MG TABLET SR 24 HR Take 2 Tablets by mouth every day. 90 Tablet 1   • Empagliflozin (JARDIANCE) 25 MG Tab Take 1 Tablet by mouth every day. 180 Tablet 1   • lisinopril-hydrochlorothiazide (PRINZIDE) 20-25 MG per tablet Take 1 Tablet by mouth every day. 90 Tablet 1   • atorvastatin (LIPITOR) 10 MG Tab TAKE 1 TABLET BY MOUTH EVERY DAY 90 Tablet 4   • Triamcinolone Acetonide  "(NASACORT AQ NA) Spray  in nose.     • Cholecalciferol (VITAMIN D PO) Take 5,000 Caps by mouth.     • Blood Glucose Monitoring Suppl Device Meter: Dispense Device of Insurance Preference. Sig. Use as directed for blood sugar monitoring. #1. NR. 1 Device 0   • Blood Glucose Monitoring Suppl SUPPLIES Misc Test strips order: Test strips for One Touch Ultra 2 meter. Sig: use twice daily and prn ssx high or low sugar #100 RF x 3 100 Each 3   • Lancets Misc 1 Each by Does not apply route 3 times a day. 100 Each 2   • levonorgestrel (MIRENA) 52 mg (20 mcg/24 hr) IUD 1 Each by Intrauterine route Once.     • tretinoin (RETIN-A) 0.05 % cream APPLY PEA SIZED AMOUNT TO FACE AT BEDTIME. (Patient not taking: Reported on 5/16/2022)  2     No current Psychiatric-ordered facility-administered medications on file.       Allergies:  Patient has no known allergies.    Health Maintenance: Due for A1c and diabetes health maintenance.  She had her retinal screening in November 2021, we will request records.      Objective:     Vital signs reviewed  Exam:  /70 (BP Location: Left arm, Patient Position: Sitting, BP Cuff Size: Adult)   Pulse 80   Temp 36.8 °C (98.3 °F) (Temporal)   Ht 1.6 m (5' 3\")   Wt 79.8 kg (176 lb)   SpO2 98%   BMI 31.18 kg/m²  Body mass index is 31.18 kg/m².    Gen: Alert and oriented, No apparent distress.  Lungs: Normal effort, CTA bilaterally, no wheezes, rhonchi, or rales  CV: Regular rate and rhythm. No murmurs, rubs, or gallops.  Ext: No clubbing, cyanosis, edema.    Monofilament testing with a 10 gram force: sensation intact: intact bilaterally  Visual Inspection: Feet without maceration, ulcers, fissures. Right posterior heel with blister forming.   Pedal pulses: intact bilaterally        Labs:      Latest Reference Range & Units 05/16/22 07:26   Glycohemoglobin 0.0 - 5.6 % 7.3 !   !: Data is abnormal    Assessment & Plan:     51 y.o. female with the following -     1. Type 2 diabetes mellitus without " complication, without long-term current use of insulin (HCC)  Chronic stable problem.  A1c completed in office today.  She will continue with her Jardiance 25 mg daily.  We discussed changing her metformin to extended release to see if she tolerates this better with her stomach.  She will start metformin ER 1000 mg daily.  She will reinforce her diet and lifestyle modifications.  Plan to recheck A1c in 3 months around August 2022.  Monofilament exam completed today.  We are requesting her recent retinal records from November 2021.  Medications refilled today.  - POCT Hemoglobin A1C  - Comp Metabolic Panel; Future  - Lipid Profile; Future  - MICROALBUMIN CREAT RATIO URINE; Future  - Diabetic Monofilament Lower Extremity Exam  - metFORMIN ER (GLUCOPHAGE XR) 500 MG TABLET SR 24 HR; Take 2 Tablets by mouth every day.  Dispense: 90 Tablet; Refill: 1  - Empagliflozin (JARDIANCE) 25 MG Tab; Take 1 Tablet by mouth every day.  Dispense: 180 Tablet; Refill: 1    2. Essential hypertension  Chronic stable problem.  Blood pressure is at goal today.  She will continue the lisinopril-hydrochlorothiazide 20-25 mg daily.  Medication refilled today.  Checking updated labs.  - lisinopril-hydrochlorothiazide (PRINZIDE) 20-25 MG per tablet; Take 1 Tablet by mouth every day.  Dispense: 90 Tablet; Refill: 1    3. Dyslipidemia  Chronic stable problem.  She will continue with her atorvastatin 10 mg every day.  She is due for updated labs.      Return in about 3 months (around 8/16/2022) for Diabetes, A1C.    Please note that this dictation was created using voice recognition software. I have made every reasonable attempt to correct obvious errors, but I expect that there are errors of grammar and possibly content that I did not discover before finalizing the note.

## 2022-05-16 NOTE — ASSESSMENT & PLAN NOTE
Her blood pressure is at goal today.  She continues with her lisinopril-hydrochlorothiazide 20-25 mg daily.  She does need a medication refill today.  She is due for updated labs.

## 2022-05-16 NOTE — ASSESSMENT & PLAN NOTE
She continues with atorvastatin 10 mg every day.  She is due for updated labs.  She has not been exercising as much recently but plans to increase her exercise with the warm weather.

## 2022-05-16 NOTE — ASSESSMENT & PLAN NOTE
She is due for A1c today, last 7.2%.  She continues with her Jardiance 25 mg daily and metformin 1000 mg in the morning and has not been taking 500 mg in the evening.  She reports her stomach does not tolerate the twice a day dosing.  She is on atorvastatin. She has not been walking as much with the cold weather. She has not been taking her calcium that her GYN had wanted her to take. She does drink red wine.

## 2022-05-24 ENCOUNTER — OFFICE VISIT (OUTPATIENT)
Dept: MEDICAL GROUP | Facility: PHYSICIAN GROUP | Age: 52
End: 2022-05-24
Payer: COMMERCIAL

## 2022-05-24 VITALS
RESPIRATION RATE: 15 BRPM | DIASTOLIC BLOOD PRESSURE: 70 MMHG | OXYGEN SATURATION: 97 % | SYSTOLIC BLOOD PRESSURE: 128 MMHG | TEMPERATURE: 98.5 F | HEART RATE: 74 BPM | BODY MASS INDEX: 31.01 KG/M2 | HEIGHT: 63 IN | WEIGHT: 175 LBS

## 2022-05-24 DIAGNOSIS — R05.9 COUGH IN ADULT: ICD-10-CM

## 2022-05-24 DIAGNOSIS — J32.9 SINUSITIS, UNSPECIFIED CHRONICITY, UNSPECIFIED LOCATION: ICD-10-CM

## 2022-05-24 PROBLEM — R05.8 DRY COUGH: Status: RESOLVED | Noted: 2018-12-12 | Resolved: 2022-05-24

## 2022-05-24 PROCEDURE — 99214 OFFICE O/P EST MOD 30 MIN: CPT | Performed by: STUDENT IN AN ORGANIZED HEALTH CARE EDUCATION/TRAINING PROGRAM

## 2022-05-24 RX ORDER — BENZONATATE 100 MG/1
100 CAPSULE ORAL 3 TIMES DAILY PRN
Qty: 30 CAPSULE | Refills: 0 | Status: SHIPPED | OUTPATIENT
Start: 2022-05-24 | End: 2022-08-17

## 2022-05-24 RX ORDER — AMOXICILLIN AND CLAVULANATE POTASSIUM 875; 125 MG/1; MG/1
1 TABLET, FILM COATED ORAL 2 TIMES DAILY
Qty: 14 TABLET | Refills: 0 | Status: SHIPPED | OUTPATIENT
Start: 2022-05-24 | End: 2022-05-31

## 2022-05-24 ASSESSMENT — FIBROSIS 4 INDEX: FIB4 SCORE: 0.74

## 2022-05-24 NOTE — PATIENT INSTRUCTIONS
Please take the following medications.     Please take the antibiotic:  - amoxicillin-clavulanate (AUGMENTIN) 875-125 MG Tab;   Take 1 Tablet by mouth 2 times a day for 7 days.    Dispense: 14 Tablet; Refill: 0    Please try the following (as needed) for cough.  - benzonatate (TESSALON) 100 MG Cap;   Take 1 Capsule by mouth 3 times a day as needed for Cough.    Dispense: 30 Capsule; Refill: 0      Please follow-up with your regular provider, in 1 week, if not improving....   Thank you.         ..........  ..........    Please consider taking the following over-the-counter   supplements for the next 1-2 weeks:     - Vitamin D3, 5,000 IU/day.     - Vitamin C, 1,000 mg, twice daily.  ... can use 2,000 mg, but this      might cause diarrhea if not used to it.      - Zinc, 100 mg / day.          
Average

## 2022-05-24 NOTE — ASSESSMENT & PLAN NOTE
"Patient has had ongoing cough for the past 6 days.  She has noticed mild production with a cough, for phlegm/sputum.…  She describes it as initially greenish, then brown, but also \"clear\" during the daytime.  He denies fever during this timeframe, but did note some chills, during the first 3 days.  She has not had any diarrhea.  She has had postnasal drip, and occasional nasal pressure and stuffiness.  She notes initially having some mild fatigue, during the first couple of days, but then improved.  She denies any dyspnea or difficulty with exertion.  However, she has had a bad taste in her throat that is worse with post-nasal drip.   ... Patient does happen to be on lisinopril, but states that she had previously become accustomed to her \"lisinopril cough\", that had been dry, and separate/different than this current issue.  ... 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  ... Both she and her coworkers have had multiple COVID tests, at work, and that have all been negative.  ... On exam, lungs CTAB, and good O2.  RRR.   - Initially discussed supportive care for possible viral infection.   (declined new / additional testing for covid).   - Augmentin 875 BID, x 7 days, for possible sinusitis, given the postnasal drip and sinus symptoms for about 1 week. (and may help cover some URT bacteria).   - Tessalon for cough (up to TID prn).   - Also stay hydrated and get nutrients....  - can also try Vit. C, D, and Zinc (if desired).   - follow-up with PCP if not improving in 1 week.    "

## 2022-05-24 NOTE — PROGRESS NOTES
Acute visit for cough  Not establishing with me on this visit.  Continues to be a Patient of PCP: CORRY Herr         Dora Sheldon is a 51 y.o. female.    Chief Complaint   Patient presents with   • Cough             Problems addressed this visit:     This note uses problem-based documentation.  Subjective HPI is included in Assessment & Plan, at bottom of note.   Problem   Cough in Adult                            Past Medical History:   Diagnosis Date   • Anxiety    • Diabetes mellitus, type 2 (HCC)    • Dyslipidemia    • HTN (hypertension)    • Hypertension    • Obesity 5/22/2019   • Sleep apnea        Patient Active Problem List   Diagnosis   • Essential hypertension   • Dyslipidemia   • Type 2 diabetes mellitus, without long-term current use of insulin (HCC)   • Hyperactivity (behavior)   • TED on CPAP   • Vitamin D deficiency   • Chronic dryness of both eyes   • Fatty liver   • Multiple thyroid nodules   • IUD (intrauterine device) in place   • Cough in adult       Past Surgical History:   Procedure Laterality Date   • HYSTEROSCOPY NOVASURE-2  2010    Dr. Sandoval   • ABDOMINOPLASTY  1999    Dr. Ricci   • TONSILLECTOMY         Family History   Problem Relation Age of Onset   • Hypertension Mother    • Hypertension Father    • Heart Attack Father    • Psychiatric Illness Paternal Uncle    • Heart Disease Maternal Grandmother    • Hypertension Maternal Grandmother    • Heart Disease Maternal Grandfather    • Hypertension Maternal Grandfather    • Diabetes Maternal Uncle    • Sleep Apnea Neg Hx        Social History     Tobacco Use   • Smoking status: Never Smoker   • Smokeless tobacco: Never Used   Substance Use Topics   • Alcohol use: Yes     Alcohol/week: 4.2 - 8.4 oz     Types: 7 - 14 Glasses of wine per week       Current medications:  (including new changes):  Current Outpatient Medications   Medication Sig Dispense Refill   • amoxicillin-clavulanate (AUGMENTIN) 875-125 MG Tab Take 1  "Tablet by mouth 2 times a day for 7 days. 14 Tablet 0   • benzonatate (TESSALON) 100 MG Cap Take 1 Capsule by mouth 3 times a day as needed for Cough. 30 Capsule 0   • Empagliflozin (JARDIANCE) 25 MG Tab Take 1 Tablet by mouth every day. 180 Tablet 1   • lisinopril-hydrochlorothiazide (PRINZIDE) 20-25 MG per tablet Take 1 Tablet by mouth every day. 90 Tablet 1   • atorvastatin (LIPITOR) 10 MG Tab TAKE 1 TABLET BY MOUTH EVERY DAY 90 Tablet 4   • tretinoin (RETIN-A) 0.05 % cream   2   • Triamcinolone Acetonide (NASACORT AQ NA) Spray  in nose.     • Cholecalciferol (VITAMIN D PO) Take 5,000 Caps by mouth.     • Blood Glucose Monitoring Suppl Device Meter: Dispense Device of Insurance Preference. Sig. Use as directed for blood sugar monitoring. #1. NR. 1 Device 0   • Blood Glucose Monitoring Suppl SUPPLIES Misc Test strips order: Test strips for One Touch Ultra 2 meter. Sig: use twice daily and prn ssx high or low sugar #100 RF x 3 100 Each 3   • Lancets Misc 1 Each by Does not apply route 3 times a day. 100 Each 2   • levonorgestrel (MIRENA) 52 mg (20 mcg/24 hr) IUD 1 Each by Intrauterine route Once.     • metFORMIN ER (GLUCOPHAGE XR) 500 MG TABLET SR 24 HR Take 2 Tablets by mouth every day. (Patient not taking: Reported on 5/24/2022) 90 Tablet 1     No current facility-administered medications for this visit.       Allergies as of 05/24/2022   • (No Known Allergies)                 Review of Symptoms   (as noted elsewhere, and .....)   GEN/CONST:   Denies fever,      + prior chills,    CARDIO:   Denies chest pain, or edema.    RESP:   Denies shortness of breath,      + coughing. ...  See HPI....   GI:    Denies nausea, vomiting, diarrhea,      PSYCH:  Denies anxiety, depression,           Physical Exam  /70 (BP Location: Right arm, Patient Position: Sitting, BP Cuff Size: Adult)   Pulse 74   Temp 36.9 °C (98.5 °F) (Temporal)   Resp 15   Ht 1.6 m (5' 3\")   Wt 79.4 kg (175 lb)   SpO2 97%   BMI 31.00 kg/m² " "  General:  Alert and oriented, No apparent distress.    Lungs: Clear to auscultation bilaterally.  No wheezes or rales.  Cardiovascular: Regular rate and rhythm.  No murmurs, or gallops.  Abdomen:  Soft.  Non-tender.  No rebound or guarding.   Extremities:  No leg edema.  Good general motion of extremities.   Psychological:  Appears to have normal mood and affect.                              Assessment & Plan  (with subjective history and orders):    Problem List Items Addressed This Visit     Cough in adult     Patient has had ongoing cough for the past 6 days.  She has noticed mild production with a cough, for phlegm/sputum.…  She describes it as initially greenish, then brown, but also \"clear\" during the daytime.  He denies fever during this timeframe, but did note some chills, during the first 3 days.  She has not had any diarrhea.  She has had postnasal drip, and occasional nasal pressure and stuffiness.  She notes initially having some mild fatigue, during the first couple of days, but then improved.  She denies any dyspnea or difficulty with exertion.  However, she has had a bad taste in her throat that is worse with post-nasal drip.   ... Patient does happen to be on lisinopril, but states that she had previously become accustomed to her \"lisinopril cough\", that had been dry, and separate/different than this current issue.  ... 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  ... Both she and her coworkers have had multiple COVID tests, at work, and that have all been negative.  ... On exam, lungs CTAB, and good O2.  RRR.   - Initially discussed supportive care for possible viral infection.   (declined new / additional testing for covid).   - Augmentin 875 BID, x 7 days, for possible sinusitis, given the postnasal drip and sinus symptoms for about 1 week. (and may help cover some URT bacteria).   - Tessalon for cough (up " to TID prn).   - Also stay hydrated and get nutrients....  - can also try Vit. C, D, and Zinc (if desired).   - follow-up with PCP if not improving in 1 week.           Relevant Medications    amoxicillin-clavulanate (AUGMENTIN) 875-125 MG Tab    benzonatate (TESSALON) 100 MG Cap                    Diagnosis Table, with orders:  1. Cough in adult  amoxicillin-clavulanate (AUGMENTIN) 875-125 MG Tab    benzonatate (TESSALON) 100 MG Cap   2. Sinusitis,   amoxicillin-clavulanate (AUGMENTIN) 875-125 MG Tab                 Follow-up:   in 1 week, if not improving,  with your regular PCP:  CITLALY Herr.P.R.N.                A computerized dictation system may have been used in this note.    Despite review, there may be some errors in spelling or grammar.    Germán Jensen M.D.  5/24/2022

## 2022-06-13 ENCOUNTER — HOSPITAL ENCOUNTER (OUTPATIENT)
Dept: LAB | Facility: MEDICAL CENTER | Age: 52
End: 2022-06-13
Attending: NURSE PRACTITIONER
Payer: COMMERCIAL

## 2022-06-13 DIAGNOSIS — E11.9 TYPE 2 DIABETES MELLITUS WITHOUT COMPLICATION, WITHOUT LONG-TERM CURRENT USE OF INSULIN (HCC): ICD-10-CM

## 2022-06-13 LAB
ALBUMIN SERPL BCP-MCNC: 4.8 G/DL (ref 3.2–4.9)
ALBUMIN/GLOB SERPL: 1.8 G/DL
ALP SERPL-CCNC: 65 U/L (ref 30–99)
ALT SERPL-CCNC: 28 U/L (ref 2–50)
ANION GAP SERPL CALC-SCNC: 14 MMOL/L (ref 7–16)
AST SERPL-CCNC: 15 U/L (ref 12–45)
BILIRUB SERPL-MCNC: 0.7 MG/DL (ref 0.1–1.5)
BUN SERPL-MCNC: 13 MG/DL (ref 8–22)
CALCIUM SERPL-MCNC: 9.6 MG/DL (ref 8.5–10.5)
CHLORIDE SERPL-SCNC: 99 MMOL/L (ref 96–112)
CHOLEST SERPL-MCNC: 151 MG/DL (ref 100–199)
CO2 SERPL-SCNC: 24 MMOL/L (ref 20–33)
CREAT SERPL-MCNC: 0.74 MG/DL (ref 0.5–1.4)
CREAT UR-MCNC: 98.82 MG/DL
FASTING STATUS PATIENT QL REPORTED: NORMAL
GFR SERPLBLD CREATININE-BSD FMLA CKD-EPI: 97 ML/MIN/1.73 M 2
GLOBULIN SER CALC-MCNC: 2.6 G/DL (ref 1.9–3.5)
GLUCOSE SERPL-MCNC: 165 MG/DL (ref 65–99)
HDLC SERPL-MCNC: 47 MG/DL
LDLC SERPL CALC-MCNC: 86 MG/DL
MICROALBUMIN UR-MCNC: 1.3 MG/DL
MICROALBUMIN/CREAT UR: 13 MG/G (ref 0–30)
POTASSIUM SERPL-SCNC: 4.2 MMOL/L (ref 3.6–5.5)
PROT SERPL-MCNC: 7.4 G/DL (ref 6–8.2)
SODIUM SERPL-SCNC: 137 MMOL/L (ref 135–145)
TRIGL SERPL-MCNC: 90 MG/DL (ref 0–149)

## 2022-06-13 PROCEDURE — 36415 COLL VENOUS BLD VENIPUNCTURE: CPT

## 2022-06-13 PROCEDURE — 80061 LIPID PANEL: CPT

## 2022-06-13 PROCEDURE — 82043 UR ALBUMIN QUANTITATIVE: CPT

## 2022-06-13 PROCEDURE — 80053 COMPREHEN METABOLIC PANEL: CPT

## 2022-06-13 PROCEDURE — 82570 ASSAY OF URINE CREATININE: CPT

## 2022-06-30 DIAGNOSIS — E11.9 TYPE 2 DIABETES MELLITUS WITHOUT COMPLICATION, WITHOUT LONG-TERM CURRENT USE OF INSULIN (HCC): ICD-10-CM

## 2022-07-02 RX ORDER — METFORMIN HYDROCHLORIDE 500 MG/1
1000 TABLET, EXTENDED RELEASE ORAL DAILY
Qty: 90 TABLET | Refills: 0 | Status: SHIPPED | OUTPATIENT
Start: 2022-07-02 | End: 2022-08-17 | Stop reason: SDUPTHER

## 2022-07-03 NOTE — TELEPHONE ENCOUNTER
Requested Prescriptions     Signed Prescriptions Disp Refills   • metFORMIN ER (GLUCOPHAGE XR) 500 MG TABLET SR 24 HR 90 Tablet 0     Sig: TAKE 2 TABLETS BY MOUTH EVERY DAY     Authorizing Provider: CONNOR ELLIOTT A.P.R.N.

## 2022-08-17 ENCOUNTER — OFFICE VISIT (OUTPATIENT)
Dept: MEDICAL GROUP | Facility: PHYSICIAN GROUP | Age: 52
End: 2022-08-17
Payer: COMMERCIAL

## 2022-08-17 VITALS
DIASTOLIC BLOOD PRESSURE: 80 MMHG | SYSTOLIC BLOOD PRESSURE: 122 MMHG | BODY MASS INDEX: 30.48 KG/M2 | OXYGEN SATURATION: 99 % | HEART RATE: 78 BPM | WEIGHT: 172 LBS | TEMPERATURE: 97.1 F | HEIGHT: 63 IN

## 2022-08-17 DIAGNOSIS — I10 ESSENTIAL HYPERTENSION: ICD-10-CM

## 2022-08-17 DIAGNOSIS — E78.5 DYSLIPIDEMIA: ICD-10-CM

## 2022-08-17 DIAGNOSIS — E11.9 TYPE 2 DIABETES MELLITUS WITHOUT COMPLICATION, WITHOUT LONG-TERM CURRENT USE OF INSULIN (HCC): ICD-10-CM

## 2022-08-17 PROBLEM — R05.9 COUGH IN ADULT: Status: RESOLVED | Noted: 2022-05-24 | Resolved: 2022-08-17

## 2022-08-17 LAB
HBA1C MFR BLD: 7.7 % (ref 0–5.6)
INT CON NEG: ABNORMAL
INT CON POS: ABNORMAL

## 2022-08-17 PROCEDURE — 99214 OFFICE O/P EST MOD 30 MIN: CPT | Performed by: NURSE PRACTITIONER

## 2022-08-17 PROCEDURE — 83036 HEMOGLOBIN GLYCOSYLATED A1C: CPT | Performed by: NURSE PRACTITIONER

## 2022-08-17 RX ORDER — METFORMIN HYDROCHLORIDE 500 MG/1
1000 TABLET, EXTENDED RELEASE ORAL 2 TIMES DAILY
Qty: 360 TABLET | Refills: 1 | Status: SHIPPED | OUTPATIENT
Start: 2022-08-17 | End: 2023-01-31

## 2022-08-17 ASSESSMENT — FIBROSIS 4 INDEX: FIB4 SCORE: 0.69

## 2022-08-17 NOTE — PROGRESS NOTES
Subjective:     CC: diabetes     HPI:   Dora presents today with the following:    Type 2 diabetes mellitus, without long-term current use of insulin ([facility])  She continues with jardiance 25 mg daily and metformin ER 1000 mg daily. She is due for updated A1C today, previous A1C 7.3%. She has been having increased stress at work, working 12 hour days 7 days a week. She has been tolerating her the metformin ER. She does drink 2 glasses of wine 1-2 times a week and is working on decreasing her intake. She has been eating lots of fruit. She has been trying to increase her exercise. Her glucometer is from 2018 and would like a new meter. She has been checking her fasting blood sugars and reports they are ranging 149-160.     Essential hypertension  Her blood pressure is at goal today. She continues with lisinopril-hydrochlorothiazide 20-25 mg daily.     Past Medical History:   Diagnosis Date    Anxiety     Diabetes mellitus, type 2 (HCC)     Dyslipidemia     HTN (hypertension)     Hypertension     Obesity 5/22/2019    Sleep apnea        Social History     Tobacco Use    Smoking status: Never    Smokeless tobacco: Never   Vaping Use    Vaping Use: Never used   Substance Use Topics    Alcohol use: Yes     Alcohol/week: 4.2 - 8.4 oz     Types: 7 - 14 Glasses of wine per week     Comment: wine daily    Drug use: No       Current Outpatient Medications Ordered in Epic   Medication Sig Dispense Refill    metFORMIN ER (GLUCOPHAGE XR) 500 MG TABLET SR 24 HR Take 2 Tablets by mouth 2 times a day. 360 Tablet 1    Blood Glucose Monitoring Suppl Device Meter: Dispense Device of Insurance Preference. Sig. Use as directed for blood sugar monitoring. #1. NR. 1 Each 0    Blood Glucose Test Strips Test strips order: Test strips for One Touch Ultra 2 meter. Sig: use twice daily and prn ssx high or low sugar #100 RF x 3 100 Strip 3    Empagliflozin (JARDIANCE) 25 MG Tab Take 1 Tablet by mouth every day. 180 Tablet 1     "lisinopril-hydrochlorothiazide (PRINZIDE) 20-25 MG per tablet Take 1 Tablet by mouth every day. 90 Tablet 1    atorvastatin (LIPITOR) 10 MG Tab TAKE 1 TABLET BY MOUTH EVERY DAY 90 Tablet 4    tretinoin (RETIN-A) 0.05 % cream   2    Triamcinolone Acetonide (NASACORT AQ NA) Spray  in nose.      Cholecalciferol (VITAMIN D PO) Take 5,000 Caps by mouth.      Lancets Misc 1 Each by Does not apply route 3 times a day. 100 Each 2    levonorgestrel (MIRENA) 52 mg (20 mcg/24 hr) IUD 1 Each by Intrauterine route Once.       No current Epic-ordered facility-administered medications on file.       Allergies:  Patient has no known allergies.    Health Maintenance: Reviewed      Objective:     Vital signs reviewed  Exam:  /80 (BP Location: Left arm, Patient Position: Sitting, BP Cuff Size: Adult)   Pulse 78   Temp 36.2 °C (97.1 °F) (Temporal)   Ht 1.6 m (5' 3\")   Wt 78 kg (172 lb)   SpO2 99%   BMI 30.47 kg/m²  Body mass index is 30.47 kg/m².    Gen: Alert and oriented, No apparent distress.  Lungs: Normal effort, CTA bilaterally, no wheezes, rhonchi, or rales  CV: Regular rate and rhythm. No murmurs, rubs, or gallops.  Ext: No clubbing, cyanosis, edema.      Labs:      Latest Reference Range & Units 8/17/22 07:43   Glycohemoglobin 0.0 - 5.6 % 7.7 !   !: Data is abnormal    Assessment & Plan:     51 y.o. female with the following -     1. Type 2 diabetes mellitus without complication, without long-term current use of insulin (HCC)  Chronic exacerbated problem.  A1c is slightly increased.  Reinforced decreasing her stress, adding exercise and watching her diet as well as decreasing her alcohol intake.  She will continue with her Jardiance 25 mg daily.  Increasing metformin ER to 1000 mg twice daily.  She is interested in weight loss, will place referral to Dr. Obrien's weight loss program.  Will place referral to pharmacotherapy and appreciate their assistance in managing her diabetes.  She is asking for a new glucometer, " new prescription sent.  Recheck A1c in 3 months around November 2022.  - POCT Hemoglobin A1C  - metFORMIN ER (GLUCOPHAGE XR) 500 MG TABLET SR 24 HR; Take 2 Tablets by mouth 2 times a day.  Dispense: 360 Tablet; Refill: 1  - Referral to Other  - Referral to Pharmacotherapy Service  - Blood Glucose Monitoring Suppl Device; Meter: Dispense Device of Insurance Preference. Sig. Use as directed for blood sugar monitoring. #1. NR.  Dispense: 1 Each; Refill: 0  - Blood Glucose Test Strips; Test strips order: Test strips for One Touch Ultra 2 meter. Sig: use twice daily and prn ssx high or low sugar #100 RF x 3  Dispense: 100 Strip; Refill: 3    2. Essential hypertension  Chronic stable problem.  Blood pressure is at goal.  She will continue with her lisinopril-hydrochlorothiazide 20-25 mg daily.  - Referral to Other    3. Dyslipidemia  Chronic stable problem.  She is interested in weight loss.  Referral placed to Dr. Obrien's weight loss program.  - Referral to Other        Return in 3 months (on 11/17/2022) for Diabetes, A1C.    Please note that this dictation was created using voice recognition software. I have made every reasonable attempt to correct obvious errors, but I expect that there are errors of grammar and possibly content that I did not discover before finalizing the note.

## 2022-08-17 NOTE — ASSESSMENT & PLAN NOTE
She continues with jardiance 25 mg daily and metformin ER 1000 mg daily. She is due for updated A1C today, previous A1C 7.3%. She has been having increased stress at work, working 12 hour days 7 days a week. She has been tolerating her the metformin ER. She does drink 2 glasses of wine 1-2 times a week and is working on decreasing her intake. She has been eating lots of fruit. She has been trying to increase her exercise. Her glucometer is from 2018 and would like a new meter. She has been checking her fasting blood sugars and reports they are ranging 149-160.

## 2022-08-17 NOTE — ASSESSMENT & PLAN NOTE
Her blood pressure is at goal today. She continues with lisinopril-hydrochlorothiazide 20-25 mg daily.

## 2022-08-18 ENCOUNTER — TELEPHONE (OUTPATIENT)
Dept: VASCULAR LAB | Facility: MEDICAL CENTER | Age: 52
End: 2022-08-18
Payer: COMMERCIAL

## 2022-08-18 NOTE — TELEPHONE ENCOUNTER
[facility]    Received DM referral from CORRY Herr on 8/17    Scheduled NP for 8/23    Insurance: [employer]  PCP: [facility]  Locations to be seen: [place]y    [facility]armacotherapy [facility] at [phone], fax [phone]    Carol Llamas, PharmD

## 2022-08-24 ENCOUNTER — DOCUMENTATION (OUTPATIENT)
Dept: VASCULAR LAB | Facility: MEDICAL CENTER | Age: 52
End: 2022-08-24
Payer: COMMERCIAL

## 2022-08-24 NOTE — PROGRESS NOTES
LM with patient to c/b and reschedule no-showed initial pharmacotherapy appt for DMII.  Greyson Arreaga, PharmD, BCACP

## 2022-08-31 ENCOUNTER — TELEPHONE (OUTPATIENT)
Dept: VASCULAR LAB | Facility: MEDICAL CENTER | Age: 52
End: 2022-08-31
Payer: COMMERCIAL

## 2022-08-31 NOTE — TELEPHONE ENCOUNTER
[facility]     Received DM referral from CORRY Herr on 8/17     Pt no showed NP for 8/23  3rd call  LM to establish care  Sent letter     Insurance: [employer]  PCP: [facility]  Locations to be seen: [place]y     [facility]Pharmacotherapy Clinic at [phone], fax [phone]     Carol Llamas, YueD

## 2022-08-31 NOTE — LETTER
August 31, 2022        Dora Sheldon  [address] [place] [address]              Dear Dora Sheldon ,    We have been unsuccessful in our attempts to contact you regarding your Diabetes Clinical Pharmacist referral.  Please contact us if you would like to schedule an appointment for help managing your blood sugars.    Please contact our clinic so we may assist you.  We are open Monday-Friday 8 am until 5 pm.  You may reach our Service at [phone].        Sincerely,    Napoleon Flanagan, YueD, [facility]  Clinic Supervisor  [facility]  Outpatient Anticoagulation Service

## 2022-09-07 ENCOUNTER — TELEPHONE (OUTPATIENT)
Dept: VASCULAR LAB | Facility: MEDICAL CENTER | Age: 52
End: 2022-09-07
Payer: COMMERCIAL

## 2022-09-07 NOTE — TELEPHONE ENCOUNTER
[facility]     Received DM referral from CORRY Herr on 8/17     Pt no showed NP for 8/23  4th call  LM to establish care     Insurance: [employer]  PCP: [facility]  Locations to be seen: [place]y     [facility] Anticoagulation/Pharmacotherapy Clinic at [phone], fax [phone]     Carol Llamas, YueD

## 2022-09-14 ENCOUNTER — DOCUMENTATION (OUTPATIENT)
Dept: VASCULAR LAB | Facility: MEDICAL CENTER | Age: 52
End: 2022-09-14
Payer: COMMERCIAL

## 2022-09-14 NOTE — PROGRESS NOTES
S/w patient today to reschedule recently missed pharmacotherapy appt for diabetes.  Rescheduled appt for 10-4-22.  Greyson Arreaga, PharmD, BCACP

## 2022-10-21 ENCOUNTER — APPOINTMENT (OUTPATIENT)
Dept: SLEEP MEDICINE | Facility: MEDICAL CENTER | Age: 52
End: 2022-10-21
Payer: COMMERCIAL

## 2022-10-25 ENCOUNTER — NON-PROVIDER VISIT (OUTPATIENT)
Dept: MEDICAL GROUP | Facility: PHYSICIAN GROUP | Age: 52
End: 2022-10-25
Payer: COMMERCIAL

## 2022-10-25 PROCEDURE — 99402 PREV MED CNSL INDIV APPRX 30: CPT | Performed by: NURSE PRACTITIONER

## 2022-10-25 RX ORDER — DULAGLUTIDE 0.75 MG/.5ML
1 INJECTION, SOLUTION SUBCUTANEOUS
Qty: 1.96 ML | Refills: 0 | Status: SHIPPED | OUTPATIENT
Start: 2022-10-25 | End: 2022-11-09

## 2022-10-25 NOTE — PROGRESS NOTES
Patient Consult Note - Initial Visit    TIME IN: 3:38pm  TIME OUT: 4:18pm    Primary care physician: CORRY Herr    Reason for consult: Management of Uncontrolled Type 2 Diabetes    HPI:  Dora Sheldon is a 52 y.o. old patient who comes in today for evaluation of above stated problem.    Most Recent HbA1c:   Lab Results   Component Value Date/Time    HBA1C 7.7 (A) 08/17/2022 07:43 AM      Lab Results   Component Value Date/Time    CREATININE 0.74 06/13/2022 07:36 AM              Diabetes Medication History and Current Regimen  Metformin ER 1000mg BID - some GI distress  SGLT-2 Inhibitor:  Empagliflozin 25 mg once daily       Previously attempted medications  Glipizide - unknown reason for d/c    Pt has home glucometer and proper testing technique - Yes    Pt reports blood sugars:   Before Breakfast: typically 140-160, no higher than 180  Before Lunch:   Before Dinner:   Before Bedtime:   Other times:     Hypoglycemia awareness - Yes  Nocturnal hypoglycemia- None  Hypoglycemia:  None    Pt's treatment of Hypoglycemia - 15:15 Rule    Current Exercise - None  Exercise Goal - 150 min/week of moderate intensity exercise once work schedule calms.    Dietary - Patient admits that dietary habits are suboptimal, has extremely high stress levels at work and unable to control dietary habits.  Admits to one to two glasses of wine several days per week.    Foot Exam:  Monofilament exam - up to date, not conducted today    Preventative Management  BP regimen (ACE/ARB) - Lisin/HCTZ 20/25mg QD  ASA - none  Statin - Atorvastatin 10mg QD  Last Retinal Scan - 11/2021  Last Foot Exam - 5/2022  Last A1c -   Lab Results   Component Value Date/Time    HBA1C 7.7 (A) 08/17/2022 07:43 AM      Last Microalbuminuria - 6/2022     Latest Reference Range & Units 6/13/22 07:36   Micro Alb Creat Ratio 0 - 30 mg/g 13   Creatinine, Urine mg/dL 98.82   Microalbumin, Urine Random mg/dL 1.3       Past Medical History:  Patient  Active Problem List    Diagnosis Date Noted    IUD (intrauterine device) in place 12/27/2021    Multiple thyroid nodules 07/20/2018    Fatty liver 06/22/2018    Chronic dryness of both eyes 12/27/2016    Vitamin D deficiency 09/22/2016    TED on CPAP 03/23/2016    Hyperactivity (behavior)     Type 2 diabetes mellitus, without long-term current use of insulin (HCC)     Essential hypertension     Dyslipidemia        Past Surgical History:  Past Surgical History:   Procedure Laterality Date    HYSTEROSCOPY NOVASURE-2  2010    Dr. Sandoval    ABDOMINOPLASTY  1999    Dr. Ricci    TONSILLECTOMY         Allergies:  Patient has no known allergies.    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[employer]     Employer: OTHER   Tobacco Use    Smoking status: Never    Smokeless tobacco: Never   Vaping Use    Vaping Use: Never used   Substance and Sexual Activity    Alcohol use: Yes     Alcohol/week: 4.2 - 8.4 oz     Types: 7 - 14 Glasses of wine per week     Comment: wine daily    Drug use: No    Sexual activity: Yes     Partners: Male     Birth control/protection: I.U.D.     Comment: IUD Dec 2016   Other Topics Concern    Not on file   Social History Narrative    Not on file     Social Determinants of Health     Financial Resource Strain: Not on file   Food Insecurity: Not on file   Transportation Needs: Not on file   Physical Activity: Sufficiently Active    Days of Exercise per Week: 3 days    Minutes of Exercise per Session: 50 min   Stress: No Stress Concern Present    Feeling of Stress : Only a little   Social Connections: Unknown    Frequency of Communication with Friends and Family: Not on file    Frequency of Social Gatherings with Friends and Family: Not on file    Attends  Hoahaoism Services: Not on file    Active Member of Clubs or Organizations: No    Attends Club or Organization Meetings: Not on file    Marital Status: Not on file   Intimate Partner Violence: Not on file   Housing Stability: Not on file       Family History:  Family History   Problem Relation Age of Onset    Hypertension Mother     Hypertension Father     Heart Attack Father     Psychiatric Illness Paternal Uncle     Heart Disease Maternal Grandmother     Hypertension Maternal Grandmother     Heart Disease Maternal Grandfather     Hypertension Maternal Grandfather     Diabetes Maternal Uncle     Sleep Apnea Neg Hx        Medications:    Current Outpatient Medications:     metFORMIN ER (GLUCOPHAGE XR) 500 MG TABLET SR 24 HR, Take 2 Tablets by mouth 2 times a day., Disp: 360 Tablet, Rfl: 1    Blood Glucose Monitoring Suppl Device, Meter: Dispense Device of Insurance Preference. Sig. Use as directed for blood sugar monitoring. #1. NR., Disp: 1 Each, Rfl: 0    Blood Glucose Test Strips, Test strips order: Test strips for One Touch Ultra 2 meter. Sig: use twice daily and prn ssx high or low sugar #100 RF x 3, Disp: 100 Strip, Rfl: 3    Empagliflozin (JARDIANCE) 25 MG Tab, Take 1 Tablet by mouth every day., Disp: 180 Tablet, Rfl: 1    lisinopril-hydrochlorothiazide (PRINZIDE) 20-25 MG per tablet, Take 1 Tablet by mouth every day., Disp: 90 Tablet, Rfl: 1    atorvastatin (LIPITOR) 10 MG Tab, TAKE 1 TABLET BY MOUTH EVERY DAY, Disp: 90 Tablet, Rfl: 4    tretinoin (RETIN-A) 0.05 % cream, , Disp: , Rfl: 2    Triamcinolone Acetonide (NASACORT AQ NA), Spray  in nose., Disp: , Rfl:     Cholecalciferol (VITAMIN D PO), Take 5,000 Caps by mouth., Disp: , Rfl:     Lancets Misc, 1 Each by Does not apply route 3 times a day., Disp: 100 Each, Rfl: 2    levonorgestrel (MIRENA) 52 mg (20 mcg/24 hr) IUD, 1 Each by Intrauterine route Once., Disp: , Rfl:     Labs: Reviewed    Physical Examination:  Vital signs: There were no vitals taken  for this visit. There is no height or weight on file to calculate BMI.    Assessment and Plan:    1. DM2  Patient seen today for initial visit, referred by PCP.  Longstanding hx of elevated and labile A1c and FBG.  Currently working an extremely high stress job, 12-14 hours per day with no days off.  She admits to not focusing much on her own health care with busy work schedule.  Endorses continued GI distress with metformin, is inquiring about alternatives.    Basic physiology of DMII was explained to patient as well as microvascular/macrovascular complications. The importance of increasing physical activity to improve diabetes control was discussed with the patient. Patient was also educated on changing diet and making better choices to help control blood sugar.    Discussed current treatment modalities and rationale for use in her case.  Ultimately, through shared decision making, decided to replace metformin with GLP1a for better control and added weight loss benefit.    - Medication changes -   Start Trulicity 0.75mg once weekly.  Stop metformin for now.  Continue all other medications for now.     - Lifestyle changes -   Diet:  As above, focus on lean meats and vegetables even in the face of long work hours and high stress levels.  Keep wine consumption minimized.  Exercise:  As above, patient would benefit from routine exercise program once work schedule permits.    Follow Up:  About four weeks.    Greyson Arreaga, PharmDBCACP  10/25/22    CC:   CORRY Herr M.D.                                                   [facility] Consent      Name    Dora Sheldon    MRN number: [id number]    the following are guidelines for participation in the [facility].     I, ____Dora Sheldon_____, understand and voluntarily agree to participate in the [facility] and to have services provided to me by  pharmacists working in collaboration with my provider.    I understand the pharmacist within the [facility] may initiate, modify or discontinue my medications, order appropriate testing and appointments, perform exams, monitor treatment, and make clinical evaluations and decisions pursuant to a collaborative practice agreement with my provider.  I understand the pharmacist within the [facility] is not a physician, osteopathic physician, advanced practice registered nurse or physician assistant and may not diagnose.  I will take all my medications as instructed and not change the way I take it without first talking to my provider or a pharmacist within the [facility].  I understand that if I am late to my appointment I may not be able to be seen by a pharmacist at that time and will have to reschedule my appointment.  During appointment with pharmacist I understand that pharmacist has the right not to answer questions or perform services outside the pharmacist’s scope of practice.  By signing below, I provide informed consent for the pharmacist to provide these services and for my participation in the [facility].      Dora Mojgan Pito           [id number]          10/25/22  Patient Name                   MRN number  Date     ___X_Obtained verbal consent from pt, No signature due to COVID concerns___   Patient Signature

## 2022-11-09 ENCOUNTER — OFFICE VISIT (OUTPATIENT)
Dept: SLEEP MEDICINE | Facility: MEDICAL CENTER | Age: 52
End: 2022-11-09
Payer: COMMERCIAL

## 2022-11-09 VITALS
WEIGHT: 169 LBS | OXYGEN SATURATION: 95 % | BODY MASS INDEX: 29.95 KG/M2 | DIASTOLIC BLOOD PRESSURE: 68 MMHG | RESPIRATION RATE: 16 BRPM | SYSTOLIC BLOOD PRESSURE: 122 MMHG | HEART RATE: 95 BPM | HEIGHT: 63 IN

## 2022-11-09 DIAGNOSIS — G47.33 OSA ON CPAP: ICD-10-CM

## 2022-11-09 DIAGNOSIS — Z78.9 NONSMOKER: ICD-10-CM

## 2022-11-09 DIAGNOSIS — I10 ESSENTIAL HYPERTENSION: ICD-10-CM

## 2022-11-09 PROCEDURE — 99213 OFFICE O/P EST LOW 20 MIN: CPT | Performed by: NURSE PRACTITIONER

## 2022-11-09 ASSESSMENT — FIBROSIS 4 INDEX: FIB4 SCORE: 0.7

## 2022-11-09 NOTE — PROGRESS NOTES
Chief Complaint   Patient presents with    Follow-Up     last seen 4/21/2022        HPI:  Dora Sheldon is a 52 y.o. year old female here today for follow-up on TED.  Last OV 4/21/22 with Javno CARRIZALES     Currently using CPAP @ 11cm H20 nightly; RESMED; device obtained 2022.   Compliance report 10/10/2022 through 11/8/2022 indicates 100% compliance, average nightly 7 hours 35 minutes, minimal to moderate mask leak with a reduced AHI of 0.1/h.  Reviewed with patient.  She is tolerating mask and pressure well.  She denies morning headaches.  She feels rested after sleeping.  She notes [employer] significant amount of stress this last year due to incidents where she works in having a heavy weight on her shoulders.  She has gained weight due to her stress.  She is trying to manage this as best she can.  She denies cardiac or respiratory symptoms.  She has no complaints using her CPAP device.    Sleep hx:  Prior Dr. Agarwal patient diagnosed in 2016 with home sleep study noting AHI 37/h.  She was placed on CPAP 11 cm.      ROS: As per HPI and otherwise negative if not stated.    Past Medical History:   Diagnosis Date    Anxiety     Diabetes mellitus, type 2 (HCC)     Dyslipidemia     HTN (hypertension)     Hypertension     Obesity 5/22/2019    Sleep apnea        Past Surgical History:   Procedure Laterality Date    HYSTEROSCOPY NOVASURE-2  2010    Dr. Sandoval    ABDOMINOPLASTY  1999    Dr. Ricci    TONSILLECTOMY         Family History   Problem Relation Age of Onset    Hypertension Mother     Hypertension Father     Heart Attack Father     Psychiatric Illness Paternal Uncle     Heart Disease Maternal Grandmother     Hypertension Maternal Grandmother     Heart Disease Maternal Grandfather     Hypertension Maternal Grandfather     Diabetes Maternal Uncle     Sleep Apnea Neg Hx        Social History     Socioeconomic History    Marital status:      Spouse name: Not on file    Number of children: 2D    Years of  "education: 2y college    Highest education level: Not on file   Occupational History    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[employer]     Employer: OTHER   Tobacco Use    Smoking status: Never    Smokeless tobacco: Never   Vaping Use    Vaping Use: Never used   Substance and Sexual Activity    Alcohol use: Yes     Alcohol/week: 4.2 - 8.4 oz     Types: 7 - 14 Glasses of wine per week     Comment: wine daily    Drug use: No    Sexual activity: Yes     Partners: Male     Birth control/protection: I.U.D.     Comment: IUD Dec 2016   Other Topics Concern    Not on file   Social History Narrative    Not on file     Social Determinants of Health     Financial Resource Strain: Not on file   Food Insecurity: Not on file   Transportation Needs: Not on file   Physical Activity: Sufficiently Active    Days of Exercise per Week: 3 days    Minutes of Exercise per Session: 50 min   Stress: No Stress Concern Present    Feeling of Stress : Only a little   Social Connections: Unknown    Frequency of Communication with Friends and Family: Not on file    Frequency of Social Gatherings with Friends and Family: Not on file    Attends Jewish Services: Not on file    Active Member of Clubs or Organizations: No    Attends Club or Organization Meetings: Not on file    Marital Status: Not on file   Intimate Partner Violence: Not on file   Housing Stability: Not on file       Allergies as of 11/09/2022    (No Known Allergies)        Vitals:  /68 (BP Location: Left arm, Patient Position: Sitting, BP Cuff Size: Adult)   Pulse 95   Resp 16   Ht 1.6 m (5' 3\")   Wt 76.7 kg (169 lb)   SpO2 95%     Current medications as of today   Current Outpatient Medications   Medication Sig Dispense Refill    metFORMIN ER (GLUCOPHAGE XR) 500 MG TABLET SR 24 HR Take 2 Tablets by mouth 2 times a day. 360 Tablet 1    Blood Glucose Monitoring Suppl Device Meter: Dispense Device of Insurance Preference. Sig. Use as directed for blood " sugar monitoring. #1. NR. 1 Each 0    Blood Glucose Test Strips Test strips order: Test strips for One Touch Ultra 2 meter. Sig: use twice daily and prn ssx high or low sugar #100 RF x 3 100 Strip 3    Empagliflozin (JARDIANCE) 25 MG Tab Take 1 Tablet by mouth every day. 180 Tablet 1    lisinopril-hydrochlorothiazide (PRINZIDE) 20-25 MG per tablet Take 1 Tablet by mouth every day. 90 Tablet 1    atorvastatin (LIPITOR) 10 MG Tab TAKE 1 TABLET BY MOUTH EVERY DAY 90 Tablet 4    tretinoin (RETIN-A) 0.05 % cream   2    Triamcinolone Acetonide (NASACORT AQ NA) Spray  in nose.      Cholecalciferol (VITAMIN D PO) Take 5,000 Caps by mouth.      Lancets Misc 1 Each by Does not apply route 3 times a day. 100 Each 2    levonorgestrel (MIRENA) 52 mg (20 mcg/24 hr) IUD 1 Intra Uterine Device by Intrauterine route one time.       No current facility-administered medications for this visit.         Physical Exam:   Gen:           Alert and oriented, No apparent distress. Mood and affect appropriate, normal interaction with examiner.  Eyes:          PERRL, EOM intact, sclere white, conjunctive moist.  Ears:          Not examined.   Hearing:     Grossly intact.  Nose:          Normal, no lesions or deformities.  Dentition:    mask  Oropharynx:   mask  Mallampati Classification: mask  Neck:        Supple, trachea midline, no masses.  Respiratory Effort: No intercostal retractions or use of accessory muscles.   Lung Auscultation:      Clear to auscultation bilaterally; no rales, rhonchi or wheezing.  CV:            Regular rate and rhythm. No murmurs, rubs or gallops.  Abd:           Not examined.   Lymphadenopathy: Not examined.  Gait and Station: Normal.  Digits and Nails: No clubbing, cyanosis, petechiae, or nodes.   Cranial Nerves: II-XII grossly intact.  Skin:        No rashes, lesions or ulcers noted.               Ext:           No cyanosis or edema.      Assessment:  1. TED on CPAP        2. Essential hypertension        3. BMI  29.0-29.9,adult        4. Nonsmoker            Immunizations:    Flu:recommend  Pneumovax 23:not du  Prevnar 13:not due  PCV 20: not due  COVID-19: 8/11/21, 7/21/21, booster recommended    Plan:  TED is well controlled on CPAP.  She will continue benefiting from CPAP 11 cm nightly.  DME mask/supplies  Discussed sleep hygiene  Follow-up with primary care for other health concerns.  Follow-up in 1 year's compliance report, sooner if needed.    Please note that this dictation was created using voice recognition software. I have made every reasonable attempt to correct obvious errors, but it is possible there are errors of grammar and possibly content that I did not discover before finalizing the note.

## 2022-11-21 ENCOUNTER — OFFICE VISIT (OUTPATIENT)
Dept: MEDICAL GROUP | Facility: PHYSICIAN GROUP | Age: 52
End: 2022-11-21
Payer: COMMERCIAL

## 2022-11-21 VITALS
DIASTOLIC BLOOD PRESSURE: 64 MMHG | OXYGEN SATURATION: 98 % | HEIGHT: 63 IN | BODY MASS INDEX: 30.12 KG/M2 | TEMPERATURE: 97.9 F | HEART RATE: 74 BPM | WEIGHT: 170 LBS | SYSTOLIC BLOOD PRESSURE: 118 MMHG

## 2022-11-21 DIAGNOSIS — M25.69 BACK STIFFNESS: ICD-10-CM

## 2022-11-21 DIAGNOSIS — E11.9 TYPE 2 DIABETES MELLITUS WITHOUT COMPLICATION, WITHOUT LONG-TERM CURRENT USE OF INSULIN (HCC): ICD-10-CM

## 2022-11-21 DIAGNOSIS — I10 ESSENTIAL HYPERTENSION: ICD-10-CM

## 2022-11-21 LAB
HBA1C MFR BLD: 7.9 % (ref 0–5.6)
INT CON NEG: ABNORMAL
INT CON POS: ABNORMAL

## 2022-11-21 PROCEDURE — 83036 HEMOGLOBIN GLYCOSYLATED A1C: CPT | Performed by: NURSE PRACTITIONER

## 2022-11-21 PROCEDURE — 99214 OFFICE O/P EST MOD 30 MIN: CPT | Performed by: NURSE PRACTITIONER

## 2022-11-21 ASSESSMENT — FIBROSIS 4 INDEX: FIB4 SCORE: 0.7

## 2022-11-21 NOTE — ASSESSMENT & PLAN NOTE
She is due for updated A1c today, previous A1c was 7.7%.  She has upcoming retinal screening scheduled for December 2022.  She has followed up with pharmacotherapy and at her appointment her metformin was stopped and Trulicity was started.  She was to continue with Jardiance 25 mg daily. She was off metformin for several weeks while on trulicity but trulicity was $986 with insurance and manufacture coupon. This was a month supply. She is now back on metformin 1,000 mg in the morning and 500 mg in the evening for the last 8 days. She was able to  her new glucometer.  Her home blood sugars have been ranging 165 in the morning and the evening 124. She is snacking on almonds before night time. She has decreased her wine intake for the last 2 weeks. 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 She has new help at work, 5 days a week. She still has increased stress at work. She is working on setting boundaries. She has been walking the last 2 weeks and is planning on adding exercise during her work week. Her family has been suppportive and encouraging her to take time for herself and focus on her health.  She has an upcoming pharmacotherapy appointment on 12/6/2022.

## 2022-11-21 NOTE — ASSESSMENT & PLAN NOTE
Her blood pressure is 118/64 today.  She continues with lisinopril-hydrochlorothiazide 20-25 mg daily.

## 2022-11-21 NOTE — PROGRESS NOTES
Subjective:     CC: Diabetes follow-up    HPI:   Dora presents today with the following:    Type 2 diabetes mellitus, without long-term current use of insulin (HCC)  She is due for updated A1c today, previous A1c was 7.7%.  She has upcoming retinal screening scheduled for December 2022.  She has followed up with pharmacotherapy and at her appointment her metformin was stopped and Trulicity was started.  She was to continue with Jardiance 25 mg daily. She was off metformin for several weeks while on trulicity but trulicity was $986 with insurance and manufacture coupon. This was a month supply. She is now back on metformin 1,000 mg in the morning and 500 mg in the evening for the last 8 days. She was able to  her new glucometer.  Her home blood sugars have been ranging 165 in the morning and the evening 124. She is snacking on almonds before night time. She has decreased her wine intake for the last 2 weeks. 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 She has new help at work, 5 days a week. She still has increased stress at work. She is working on setting boundaries. She has been walking the last 2 weeks and is planning on adding exercise during her work week. Her family has been suppportive and encouraging her to take time for herself and focus on her health.  She has an upcoming pharmacotherapy appointment on 12/6/2022.    Essential hypertension  Her blood pressure is 118/64 today.  She continues with lisinopril-hydrochlorothiazide 20-25 mg daily.    Past Medical History:   Diagnosis Date    Anxiety     Diabetes mellitus, type 2 (HCC)     Dyslipidemia     HTN (hypertension)     Hypertension     Obesity 5/22/2019    Sleep apnea        Social History     Tobacco Use    Smoking status: Never    Smokeless tobacco: Never   Vaping Use    Vaping Use: Never used   Substance Use Topics    Alcohol use: Yes     Alcohol/week: 4.2 - 8.4 oz     Types: 7 - 14 Glasses of wine per week      "Comment: wine daily    Drug use: No       Current Outpatient Medications Ordered in Epic   Medication Sig Dispense Refill    metFORMIN ER (GLUCOPHAGE XR) 500 MG TABLET SR 24 HR Take 2 Tablets by mouth 2 times a day. 360 Tablet 1    Blood Glucose Monitoring Suppl Device Meter: Dispense Device of Insurance Preference. Sig. Use as directed for blood sugar monitoring. #1. NR. 1 Each 0    Blood Glucose Test Strips Test strips order: Test strips for One Touch Ultra 2 meter. Sig: use twice daily and prn ssx high or low sugar #100 RF x 3 100 Strip 3    Empagliflozin (JARDIANCE) 25 MG Tab Take 1 Tablet by mouth every day. 180 Tablet 1    lisinopril-hydrochlorothiazide (PRINZIDE) 20-25 MG per tablet Take 1 Tablet by mouth every day. 90 Tablet 1    atorvastatin (LIPITOR) 10 MG Tab TAKE 1 TABLET BY MOUTH EVERY DAY 90 Tablet 4    tretinoin (RETIN-A) 0.05 % cream   2    Triamcinolone Acetonide (NASACORT AQ NA) Spray  in nose.      Cholecalciferol (VITAMIN D PO) Take 5,000 Caps by mouth.      Lancets Misc 1 Each by Does not apply route 3 times a day. 100 Each 2    levonorgestrel (MIRENA) 52 mg (20 mcg/24 hr) IUD 1 Intra Uterine Device by Intrauterine route one time.       No current [facility]-ordered facility-administered medications on file.       Allergies:  Patient has no known allergies.    Health Maintenance: Reviewed.  Declines influenza and pneumonia vaccine today.      Objective:     Vital signs reviewed  Exam:  /64 (BP Location: Left arm, Patient Position: Sitting, BP Cuff Size: Adult)   Pulse 74   Temp 36.6 °C (97.9 °F) (Temporal)   Ht 1.6 m (5' 3\")   Wt 77.1 kg (170 lb)   SpO2 98%   BMI 30.11 kg/m²  Body mass index is 30.11 kg/m².    Gen: Alert and oriented, No apparent distress.  Lungs: Normal effort, CTA bilaterally, no wheezes, rhonchi, or rales  CV: Regular rate and rhythm. No murmurs, rubs, or gallops.  Ext: No clubbing, cyanosis, edema.      Labs:      Latest Reference Range & Units 11/21/22 08:09 "   Glycohemoglobin 0.0 - 5.6 % 7.9 !   !: Data is abnormal    Assessment & Plan:     52 y.o. female with the following -     1. Type 2 diabetes mellitus without complication, without long-term current use of insulin (HCC)  Chronic exacerbated problem.  A1c has increased to 7.9%.  She has not been on her metformin consistently due to trial of Trulicity that was stopped due to cost.  She does notice higher blood sugars in the morning and we discussed switching her metformin to 500 mg in the morning and 1000 mg in the evening to see if this helps her morning blood sugars.  Continue checking home blood sugar.  She will continue with Jardiance 25 mg daily.  Keep upcoming pharmacotherapy appointment.  Keep upcoming retinal screening.  Reinforced diet and lifestyle modifications.  Return in 3 months for A1c.  - POCT  A1C    2. Essential hypertension  Chronic stable problem.  Blood pressure is at goal today.  Continue lisinopril-hydrochlorothiazide 20-25 mg daily.    3. Back stiffness  Chronic stable problem.  She states that her prior PCP had given her prescription for physical therapy to help with back stiffness and stretching.  She is requesting new referral today.  Referral placed today.  - Referral to Physical Therapy        Return in about 3 months (around 2/21/2023) for Diabetes, A1C.    Please note that this dictation was created using voice recognition software. I have made every reasonable attempt to correct obvious errors, but I expect that there are errors of grammar and possibly content that I did not discover before finalizing the note.

## 2022-11-29 DIAGNOSIS — I10 ESSENTIAL HYPERTENSION: ICD-10-CM

## 2022-11-29 RX ORDER — LISINOPRIL AND HYDROCHLOROTHIAZIDE 25; 20 MG/1; MG/1
1 TABLET ORAL
Qty: 90 TABLET | Refills: 3 | Status: SHIPPED | OUTPATIENT
Start: 2022-11-29 | End: 2023-12-01 | Stop reason: SDUPTHER

## 2022-11-30 NOTE — TELEPHONE ENCOUNTER
Requested Prescriptions     Signed Prescriptions Disp Refills    lisinopril-hydrochlorothiazide (PRINZIDE) 20-25 MG per tablet 90 Tablet 3     Sig: TAKE 1 TABLET BY MOUTH EVERY DAY     Authorizing Provider: CONNOR ELLIOTT A.P.R.N.

## 2022-12-01 DIAGNOSIS — E78.5 DYSLIPIDEMIA: ICD-10-CM

## 2022-12-01 RX ORDER — ATORVASTATIN CALCIUM 10 MG/1
10 TABLET, FILM COATED ORAL
Qty: 90 TABLET | Refills: 3 | Status: SHIPPED | OUTPATIENT
Start: 2022-12-01 | End: 2023-12-01 | Stop reason: SDUPTHER

## 2022-12-02 NOTE — TELEPHONE ENCOUNTER
Requested Prescriptions     Signed Prescriptions Disp Refills    atorvastatin (LIPITOR) 10 MG Tab 90 Tablet 3     Sig: Take 1 Tablet by mouth every day.     Authorizing Provider: CONNOR ELLIOTT A.P.R.N.

## 2022-12-21 ENCOUNTER — TELEPHONE (OUTPATIENT)
Dept: VASCULAR LAB | Facility: MEDICAL CENTER | Age: 52
End: 2022-12-21
Payer: COMMERCIAL

## 2022-12-21 NOTE — TELEPHONE ENCOUNTER
Pt missed their DM fu appointment on 12/20.  She cancelled the last 2 appts.  Last seen 10/25    LVM to reschedule.     Carol Llamas, PharmD

## 2022-12-28 ENCOUNTER — TELEPHONE (OUTPATIENT)
Dept: VASCULAR LAB | Facility: MEDICAL CENTER | Age: 52
End: 2022-12-28
Payer: COMMERCIAL

## 2022-12-28 NOTE — TELEPHONE ENCOUNTER
Pt missed their DM fu appointment on 12/20.  She cancelled the last 2 appts.  Last seen 10/25.    R/s f/u for 1/31.     Christiano Benavides, YueD, BCACP

## 2023-01-31 ENCOUNTER — NON-PROVIDER VISIT (OUTPATIENT)
Dept: MEDICAL GROUP | Facility: PHYSICIAN GROUP | Age: 53
End: 2023-01-31
Payer: COMMERCIAL

## 2023-01-31 PROCEDURE — 99401 PREV MED CNSL INDIV APPRX 15: CPT | Performed by: STUDENT IN AN ORGANIZED HEALTH CARE EDUCATION/TRAINING PROGRAM

## 2023-01-31 NOTE — PROGRESS NOTES
Patient Consult Note - Follow Up Visit  Primary care physician: CORRY Herr    Reason for consult: Management of Uncontrolled Type 2 Diabetes    Time IN:  9:03am  Time OUT:  9:22am    HPI:  Dora Sheldon is a 52 y.o. old patient who comes in today for evaluation of above stated problem.    Most Recent HbA1c:   Lab Results   Component Value Date/Time    HBA1C 7.9 (A) 11/21/2022 08:09 AM        Diabetes Medication History and Current Regimen  Metformin ER 1000mg BID - some GI distress  SGLT-2 Inhibitor:  Empagliflozin 25 mg once daily         Previously attempted medications  Glipizide - unknown reason for d/c    Before Breakfast:  160-190  Before Lunch:  Before Dinner:  Before Bedtime:  Other times:  Hypoglycemia:  None    Diet/Exercise:  Improving, has decreased alcohol intake significantly and is recognizing need to decrease carbs.  Has focused on making evening meal high protein/high veggie and finished at least 2-3 hrs before bedtime.  Has begun sporadic walking program and plans to increase frequency going forward.    Foot Exam:  Monofilament exam - up to date, not conducted today    Preventative Management  BP regimen (ACE/ARB) - Lisin/HCTZ 20/25mg QD  ASA - none  Statin - Atorvastatin 10mg QD  Last Retinal Scan - 11/2021  Last Foot Exam - 5/2022  Last A1c -   Lab Results   Component Value Date/Time    HBA1C 7.9 (A) 11/21/2022 08:09 AM      Last Microalbuminuria - 6/2022       Latest Reference Range & Units 6/13/22 07:36   Micro Alb Creat Ratio 0 - 30 mg/g 13   Creatinine, Urine mg/dL 98.82   Microalbumin, Urine Random mg/dL 1.3        ROS:  Constitutional: No weight loss  Cardiac: No palpitations or racing heart  Resp: No shortness of breath  Neuro: No numbness or tinging in feet  Endo: No heat or cold intolerance, no polyuria or polydipsia  All other systems were reviewed and were negative.    Past Medical History:  Patient Active Problem List    Diagnosis Date Noted    IUD (intrauterine  device) in place 12/27/2021    Multiple thyroid nodules 07/20/2018    Fatty liver 06/22/2018    Chronic dryness of both eyes 12/27/2016    Vitamin D deficiency 09/22/2016    TED on CPAP 03/23/2016    Hyperactivity (behavior)     Type 2 diabetes mellitus, without long-term current use of insulin (HCC)     Essential hypertension     Dyslipidemia        Past Surgical History:  Past Surgical History:   Procedure Laterality Date    HYSTEROSCOPY NOVASURE-2  2010    Dr. Sandoval    ABDOMINOPLASTY  1999    Dr. Ricci    TONSILLECTOMY         Allergies:  Patient has no known allergies.    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     Comment: IUD Dec 2016   Other Topics Concern    Not on file   Social History Narrative    Not on file     Social Determinants of Health     Financial Resource Strain: Not on file   Food Insecurity: Not on file   Transportation Needs: Not on file   Physical Activity: Not on file   Stress: Not on file   Social Connections: Not on file   Intimate Partner Violence: Not on file   Housing Stability: Not on file       Family History:  Family History   Problem Relation Age of Onset    Hypertension Mother     Hypertension Father     Heart Attack Father     Psychiatric Illness Paternal Uncle     Heart Disease Maternal Grandmother     Hypertension Maternal Grandmother     Heart Disease  Maternal Grandfather     Hypertension Maternal Grandfather     Diabetes Maternal Uncle     Sleep Apnea Neg Hx        Medications:    Current Outpatient Medications:     atorvastatin (LIPITOR) 10 MG Tab, Take 1 Tablet by mouth every day., Disp: 90 Tablet, Rfl: 3    lisinopril-hydrochlorothiazide (PRINZIDE) 20-25 MG per tablet, TAKE 1 TABLET BY MOUTH EVERY DAY, Disp: 90 Tablet, Rfl: 3    metFORMIN ER (GLUCOPHAGE XR) 500 MG TABLET SR 24 HR, Take 2 Tablets by mouth 2 times a day., Disp: 360 Tablet, Rfl: 1    Blood Glucose Monitoring Suppl Device, Meter: Dispense Device of Insurance Preference. Sig. Use as directed for blood sugar monitoring. #1. NR., Disp: 1 Each, Rfl: 0    Blood Glucose Test Strips, Test strips order: Test strips for One Touch Ultra 2 meter. Sig: use twice daily and prn ssx high or low sugar #100 RF x 3, Disp: 100 Strip, Rfl: 3    Empagliflozin (JARDIANCE) 25 MG Tab, Take 1 Tablet by mouth every day., Disp: 180 Tablet, Rfl: 1    tretinoin (RETIN-A) 0.05 % cream, , Disp: , Rfl: 2    Triamcinolone Acetonide (NASACORT AQ NA), Spray  in nose., Disp: , Rfl:     Cholecalciferol (VITAMIN D PO), Take 5,000 Caps by mouth., Disp: , Rfl:     Lancets Misc, 1 Each by Does not apply route 3 times a day., Disp: 100 Each, Rfl: 2    levonorgestrel (MIRENA) 52 mg (20 mcg/24 hr) IUD, 1 Intra Uterine Device by Intrauterine route one time., Disp: , Rfl:     Labs: Reviewed    Physical Examination:  Vital signs: There were no vitals taken for this visit. There is no height or weight on file to calculate BMI.  General: No apparent distress, cooperative  Eyes: No scleral icterus or discharge  ENMT: Normal on external inspection of nose, lips, normal thyroid exam  Neck: No abnormal masses on inspection  Resp: Normal effort, clear to auscultation bilaterally   CVS: Regular rate and rhythm, S1 S2 normal, no murmur   Extremities: No edema  Abdomen: abdominal obesity present  Neuro: Alert and oriented  Skin: No rash  Psych: Normal  mood and affect, intact memory and able to make informed decisions    Assessment and Plan:    Patient is optimized on metformin and Jardiance.  Home FBG still above goal, but have begun to improve some recently.  Continues to have some stress at work, but is looking forward to next few months as that should subside.  Had f/u with ENT and found to have several thyroid nodules that will be biopsied in the next couple weeks.  Patient has begun to focus more and more on personal health and well being.    At this time, will not make alterations to treatment at recommendation of ENT.    Discussed additional pharmacotherapy once thyroid issues are addressed.  Per patient, GLP1a that was prescribed was extremely expensive.  Continue to focus on intense TLC at home.    Follow Up:  Seven weeks.    Thank you for allowing me to participate in the care of this patient.    Greyson Arreaga, PharmD, [facility]  01/31/23    CC:   CORRY Herr

## 2023-02-01 ENCOUNTER — APPOINTMENT (RX ONLY)
Dept: URBAN - METROPOLITAN AREA CLINIC 22 | Facility: CLINIC | Age: 53
Setting detail: DERMATOLOGY
End: 2023-02-01

## 2023-02-01 DIAGNOSIS — L81.1 CHLOASMA: ICD-10-CM | Status: STABLE

## 2023-02-01 DIAGNOSIS — Z71.89 OTHER SPECIFIED COUNSELING: ICD-10-CM

## 2023-02-01 DIAGNOSIS — D22 MELANOCYTIC NEVI: ICD-10-CM

## 2023-02-01 DIAGNOSIS — L50.1 IDIOPATHIC URTICARIA: ICD-10-CM | Status: WELL CONTROLLED

## 2023-02-01 DIAGNOSIS — L81.4 OTHER MELANIN HYPERPIGMENTATION: ICD-10-CM

## 2023-02-01 PROBLEM — D22.5 MELANOCYTIC NEVI OF TRUNK: Status: ACTIVE | Noted: 2023-02-01

## 2023-02-01 PROBLEM — D22.21 MELANOCYTIC NEVI OF RIGHT EAR AND EXTERNAL AURICULAR CANAL: Status: ACTIVE | Noted: 2023-02-01

## 2023-02-01 PROBLEM — D22.22 MELANOCYTIC NEVI OF LEFT EAR AND EXTERNAL AURICULAR CANAL: Status: ACTIVE | Noted: 2023-02-01

## 2023-02-01 PROCEDURE — ? ADDITIONAL NOTES

## 2023-02-01 PROCEDURE — ? COUNSELING

## 2023-02-01 PROCEDURE — ? TREATMENT REGIMEN

## 2023-02-01 PROCEDURE — ? SUNSCREEN TREATMENT REGIMEN

## 2023-02-01 PROCEDURE — 99213 OFFICE O/P EST LOW 20 MIN: CPT

## 2023-02-01 ASSESSMENT — LOCATION DETAILED DESCRIPTION DERM
LOCATION DETAILED: RIGHT SUPERIOR HELIX
LOCATION DETAILED: LEFT POSTERIOR EAR
LOCATION DETAILED: RIGHT INFERIOR MEDIAL MIDBACK
LOCATION DETAILED: LEFT INFERIOR HELIX
LOCATION DETAILED: INFERIOR MID FOREHEAD
LOCATION DETAILED: SUPERIOR THORACIC SPINE
LOCATION DETAILED: RIGHT LATERAL MALAR CHEEK
LOCATION DETAILED: RIGHT MEDIAL FOREHEAD
LOCATION DETAILED: RIGHT VENTRAL PROXIMAL FOREARM
LOCATION DETAILED: PERIUMBILICAL SKIN
LOCATION DETAILED: LEFT VENTRAL PROXIMAL FOREARM
LOCATION DETAILED: LEFT CENTRAL MALAR CHEEK

## 2023-02-01 ASSESSMENT — LOCATION ZONE DERM
LOCATION ZONE: TRUNK
LOCATION ZONE: ARM
LOCATION ZONE: FACE
LOCATION ZONE: EAR

## 2023-02-01 ASSESSMENT — LOCATION SIMPLE DESCRIPTION DERM
LOCATION SIMPLE: RIGHT LOWER BACK
LOCATION SIMPLE: RIGHT FOREHEAD
LOCATION SIMPLE: RIGHT FOREARM
LOCATION SIMPLE: LEFT FOREARM
LOCATION SIMPLE: RIGHT EAR
LOCATION SIMPLE: RIGHT CHEEK
LOCATION SIMPLE: LEFT EAR
LOCATION SIMPLE: INFERIOR FOREHEAD
LOCATION SIMPLE: ABDOMEN
LOCATION SIMPLE: LEFT CHEEK
LOCATION SIMPLE: UPPER BACK

## 2023-02-01 NOTE — PROCEDURE: ADDITIONAL NOTES
Additional Notes: Shared photos with findings consistent with urticaria. Lesions comes and go within 24 hours of appearing. No specific triggers. Recommend daily antihistamine when develops outbreak.
Render Risk Assessment In Note?: no
Detail Level: Detailed

## 2023-02-01 NOTE — PROCEDURE: TREATMENT REGIMEN
Initiate Treatment: Daily non-drowsy antihistamine, examples Zyrtec, Allegra or Claritin
Detail Level: Simple
Continue Regimen: Tri-faizan daily for 2 months on 2 months off\\nAzelaic acid nightly as tolerated\\nTretinoin nightly when off Triluma

## 2023-02-22 ENCOUNTER — OFFICE VISIT (OUTPATIENT)
Dept: MEDICAL GROUP | Facility: PHYSICIAN GROUP | Age: 53
End: 2023-02-22
Payer: COMMERCIAL

## 2023-02-22 VITALS
WEIGHT: 169 LBS | HEIGHT: 63 IN | DIASTOLIC BLOOD PRESSURE: 70 MMHG | OXYGEN SATURATION: 99 % | SYSTOLIC BLOOD PRESSURE: 116 MMHG | TEMPERATURE: 97.7 F | BODY MASS INDEX: 29.95 KG/M2 | HEART RATE: 71 BPM

## 2023-02-22 DIAGNOSIS — E04.2 MULTIPLE THYROID NODULES: ICD-10-CM

## 2023-02-22 DIAGNOSIS — Z11.59 NEED FOR HEPATITIS C SCREENING TEST: ICD-10-CM

## 2023-02-22 DIAGNOSIS — E11.9 TYPE 2 DIABETES MELLITUS WITHOUT COMPLICATION, WITHOUT LONG-TERM CURRENT USE OF INSULIN (HCC): ICD-10-CM

## 2023-02-22 DIAGNOSIS — E78.5 DYSLIPIDEMIA: ICD-10-CM

## 2023-02-22 LAB
HBA1C MFR BLD: 7.8 % (ref ?–5.8)
POCT INT CON NEG: NEGATIVE
POCT INT CON POS: POSITIVE

## 2023-02-22 PROCEDURE — 99214 OFFICE O/P EST MOD 30 MIN: CPT | Performed by: NURSE PRACTITIONER

## 2023-02-22 PROCEDURE — 83036 HEMOGLOBIN GLYCOSYLATED A1C: CPT | Performed by: NURSE PRACTITIONER

## 2023-02-22 ASSESSMENT — FIBROSIS 4 INDEX: FIB4 SCORE: 0.7

## 2023-02-22 NOTE — PROGRESS NOTES
Subjective:     CC: Requesting labs, diabetes follow-up    HPI:   Dora presents today with the following:    Type 2 diabetes mellitus, without long-term current use of insulin (HCC)  Due for A1c today, previous A1c 7.9%.  She had her retinal exam and we will request records.  She continues with metformin 1000 mg twice daily (dose increased 3 weeks ago by pharmacotherapy) and empagliflozin 25 mg daily. She has followed up with pharmacotherapy on 1/31/2023. She is walking 3 days a week and has been following up with physical therapy. She has had increased family stress. She started using Leandra detox drops. She has noticed brain clarity, better sleep, and decreased alcohol use (wine every other day) since starting the drops. She is trying to focus more on herself.     Multiple thyroid nodules  She has followed up with ENT in December 2022. She had an ultrasound and found a mass right lower neck, biopsy planned for Tuesday 2/28/2023 at [facility]. She would like updated thyroid function labs.       Past Medical History:   Diagnosis Date    Anxiety     Diabetes mellitus, type 2 (HCC)     Dyslipidemia     HTN (hypertension)     Hypertension     Obesity 5/22/2019    Sleep apnea        Social History     Tobacco Use    Smoking status: Never    Smokeless tobacco: Never   Vaping Use    Vaping Use: Never used   Substance Use Topics    Alcohol use: Yes     Alcohol/week: 4.2 - 8.4 oz     Types: 7 - 14 Glasses of wine per week     Comment: wine every other day    Drug use: No       Current Outpatient Medications Ordered in Epic   Medication Sig Dispense Refill    metformin (GLUCOPHAGE) 1000 MG tablet Take 1 Tablet by mouth 2 times a day with meals. 180 Tablet 1    atorvastatin (LIPITOR) 10 MG Tab Take 1 Tablet by mouth every day. 90 Tablet 3    lisinopril-hydrochlorothiazide (PRINZIDE) 20-25 MG per tablet TAKE 1 TABLET BY MOUTH EVERY DAY 90 Tablet 3    Blood Glucose Monitoring Suppl Device Meter: Dispense Device of  "Insurance Preference. Sig. Use as directed for blood sugar monitoring. #1. NR. 1 Each 0    Blood Glucose Test Strips Test strips order: Test strips for One Touch Ultra 2 meter. Sig: use twice daily and prn ssx high or low sugar #100 RF x 3 100 Strip 3    Empagliflozin (JARDIANCE) 25 MG Tab Take 1 Tablet by mouth every day. 180 Tablet 1    tretinoin (RETIN-A) 0.05 % cream   2    Triamcinolone Acetonide (NASACORT AQ NA) Spray  in nose.      Cholecalciferol (VITAMIN D PO) Take 5,000 Caps by mouth.      Lancets Misc 1 Each by Does not apply route 3 times a day. 100 Each 2     No current Epic-ordered facility-administered medications on file.       Allergies:  Patient has no known allergies.    Health Maintenance: Reviewed.       Objective:     Vital signs reviewed  Exam:  /70 (BP Location: Right arm, Patient Position: Sitting, BP Cuff Size: Adult)   Pulse 71   Temp 36.5 °C (97.7 °F) (Temporal)   Ht 1.6 m (5' 3\")   Wt 76.7 kg (169 lb)   SpO2 99%   BMI 29.94 kg/m²  Body mass index is 29.94 kg/m².    Gen: Alert and oriented, No apparent distress.  Neck: Neck is supple, full ROM.  Lungs: Normal effort, no audible wheezes  CV: Skin pink, warm and dry.  Ext: No clubbing, cyanosis, edema.        Labs:      Latest Reference Range & Units 02/22/23 09:21   Glycohemoglobin 5.8 % 7.8 !   !: Data is abnormal    Assessment & Plan:     52 y.o. female with the following -     1. Type 2 diabetes mellitus without complication, without long-term current use of insulin (HCC)  Chronic exacerbated problem.  A1c still elevated with slight decrease.  Her metformin dose was recently increased.  We will plan to recheck A1c in 3 months around May 2023.  She will continue with her metformin 1000 mg twice daily and Empagliflozin 25 mg daily.  Checking updated annual health maintenance labs.  Continue healthy diet and increasing exercise.  - CBC WITH DIFFERENTIAL; Future  - Comp Metabolic Panel; Future  - MICROALBUMIN CREAT RATIO URINE; " Future  - POCT  A1C    2. Multiple thyroid nodules  Chronic stable problem.  She will continue with her biopsy plan for 2/28/2023.  Checking updated thyroid functions.  - TSH; Future  - FREE THYROXINE; Future  - TRIIDOTHYRONINE; Future    3. Dyslipidemia  Chronic stable problem.  She will continue with atorvastatin 10 mg daily.  Due for updated labs.  - Lipid Profile; Future    4. Need for hepatitis C screening test  Acute uncomplicated problem.  Discussed screening for hepatitis C and she is in agreement.  - HEP C VIRUS ANTIBODY; Future        Return in about 3 months (around 5/22/2023) for A1C, Diabetes.    Please note that this dictation was created using voice recognition software. I have made every reasonable attempt to correct obvious errors, but I expect that there are errors of grammar and possibly content that I did not discover before finalizing the note.

## 2023-02-22 NOTE — ASSESSMENT & PLAN NOTE
She has followed up with ENT in December 2022. She had an ultrasound and found a mass right lower neck, biopsy planned for Tuesday 2/28/2023 at [facility]. She would like updated thyroid function labs.

## 2023-02-22 NOTE — ASSESSMENT & PLAN NOTE
Due for A1c today, previous A1c 7.9%.  She had her retinal exam and we will request records.  She continues with metformin 1000 mg twice daily (dose increased 3 weeks ago by pharmacotherapy) and empagliflozin 25 mg daily. She has followed up with pharmacotherapy on 1/31/2023. She is walking 3 days a week and has been following up with physical therapy. She has had increased family stress. She started using Leandra detox drops. She has noticed brain clarity, better sleep, and decreased alcohol use (wine every other day) since starting the drops. She is trying to focus more on herself.

## 2023-02-22 NOTE — LETTER
[facility]  CORRY Herr  [address] [place] [address]  Fax: [phone]   Authorization for Release/Disclosure of   Protected Health Information   Name: PINO TAMEZ : 1970 SSN: [id number]   Address: [address] [place], [place] [address] [address] Phone:    [phone] (home) [phone] (work)   I authorize the entity listed below to release/disclose the PHI below to:   [facility]/CORRY Herr and CORRY Herr   Provider or Entity Name:  [facility]   Address   City, [facility], [facility]   Phone:  [phone]    Fax:     Reason for request: continuity of care   Information to be released:    [  ] LAST COLONOSCOPY,  including any PATH REPORT and follow-up  [  ] LAST FIT/COLOGUARD RESULT [  ] LAST DEXA  [  ] LAST MAMMOGRAM  [  ] LAST PAP  [  ] LAST LABS [  ] RETINA EXAM REPORT  [  ] IMMUNIZATION RECORDS  [  ] Release all info      [  ] Check here and initial the line next to each item to release ALL health information INCLUDING  _____ Care and treatment for drug and / or alcohol abuse  _____ HIV testing, infection status, or AIDS  _____ Genetic Testing    DATES OF SERVICE OR TIME PERIOD TO BE DISCLOSED: _____________  I understand and acknowledge that:  * This Authorization may be revoked at any time by you in writing, except if your health information has already been used or disclosed.  * Your health information that will be used or disclosed as a result of you signing this authorization could be re-disclosed by the recipient. If this occurs, your re-disclosed health information may no longer be protected by State or Federal laws.  * You may refuse to sign this Authorization. Your refusal will not affect your ability to obtain treatment.  * This Authorization becomes effective upon signing and will  on (date) __________.      If no date is indicated, this Authorization will  one (1) year from the signature date.    Name:  Dora Sheldon  Signature: Date:   2/22/2023     PLEASE FAX REQUESTED RECORDS BACK TO: [phone]

## 2023-03-08 ENCOUNTER — HOSPITAL ENCOUNTER (OUTPATIENT)
Dept: LAB | Facility: MEDICAL CENTER | Age: 53
End: 2023-03-08
Attending: NURSE PRACTITIONER
Payer: COMMERCIAL

## 2023-03-08 DIAGNOSIS — E04.2 MULTIPLE THYROID NODULES: ICD-10-CM

## 2023-03-08 DIAGNOSIS — E11.9 TYPE 2 DIABETES MELLITUS WITHOUT COMPLICATION, WITHOUT LONG-TERM CURRENT USE OF INSULIN (HCC): ICD-10-CM

## 2023-03-08 DIAGNOSIS — E78.5 DYSLIPIDEMIA: ICD-10-CM

## 2023-03-08 DIAGNOSIS — Z11.59 NEED FOR HEPATITIS C SCREENING TEST: ICD-10-CM

## 2023-03-08 LAB
ALBUMIN SERPL BCP-MCNC: 5 G/DL (ref 3.2–4.9)
ALBUMIN/GLOB SERPL: 1.9 G/DL
ALP SERPL-CCNC: 77 U/L (ref 30–99)
ALT SERPL-CCNC: 33 U/L (ref 2–50)
ANION GAP SERPL CALC-SCNC: 14 MMOL/L (ref 7–16)
AST SERPL-CCNC: 19 U/L (ref 12–45)
BASOPHILS # BLD AUTO: 0.8 % (ref 0–1.8)
BASOPHILS # BLD: 0.06 K/UL (ref 0–0.12)
BILIRUB SERPL-MCNC: 0.8 MG/DL (ref 0.1–1.5)
BUN SERPL-MCNC: 20 MG/DL (ref 8–22)
CALCIUM ALBUM COR SERPL-MCNC: 9.6 MG/DL (ref 8.5–10.5)
CALCIUM SERPL-MCNC: 10.4 MG/DL (ref 8.5–10.5)
CHLORIDE SERPL-SCNC: 99 MMOL/L (ref 96–112)
CHOLEST SERPL-MCNC: 176 MG/DL (ref 100–199)
CO2 SERPL-SCNC: 25 MMOL/L (ref 20–33)
CREAT SERPL-MCNC: 0.85 MG/DL (ref 0.5–1.4)
CREAT UR-MCNC: 110.89 MG/DL
EOSINOPHIL # BLD AUTO: 0.17 K/UL (ref 0–0.51)
EOSINOPHIL NFR BLD: 2.3 % (ref 0–6.9)
ERYTHROCYTE [DISTWIDTH] IN BLOOD BY AUTOMATED COUNT: 46.5 FL (ref 35.9–50)
FASTING STATUS PATIENT QL REPORTED: NORMAL
GFR SERPLBLD CREATININE-BSD FMLA CKD-EPI: 82 ML/MIN/1.73 M 2
GLOBULIN SER CALC-MCNC: 2.7 G/DL (ref 1.9–3.5)
GLUCOSE SERPL-MCNC: 187 MG/DL (ref 65–99)
HCT VFR BLD AUTO: 44.5 % (ref 37–47)
HCV AB SER QL: NORMAL
HDLC SERPL-MCNC: 40 MG/DL
HGB BLD-MCNC: 14.7 G/DL (ref 12–16)
IMM GRANULOCYTES # BLD AUTO: 0.04 K/UL (ref 0–0.11)
IMM GRANULOCYTES NFR BLD AUTO: 0.5 % (ref 0–0.9)
LDLC SERPL CALC-MCNC: 102 MG/DL
LYMPHOCYTES # BLD AUTO: 2.13 K/UL (ref 1–4.8)
LYMPHOCYTES NFR BLD: 29.3 % (ref 22–41)
MCH RBC QN AUTO: 32.1 PG (ref 27–33)
MCHC RBC AUTO-ENTMCNC: 33 G/DL (ref 33.6–35)
MCV RBC AUTO: 97.2 FL (ref 81.4–97.8)
MICROALBUMIN UR-MCNC: 1.4 MG/DL
MICROALBUMIN/CREAT UR: 13 MG/G (ref 0–30)
MONOCYTES # BLD AUTO: 0.44 K/UL (ref 0–0.85)
MONOCYTES NFR BLD AUTO: 6 % (ref 0–13.4)
NEUTROPHILS # BLD AUTO: 4.44 K/UL (ref 2–7.15)
NEUTROPHILS NFR BLD: 61.1 % (ref 44–72)
NRBC # BLD AUTO: 0 K/UL
NRBC BLD-RTO: 0 /100 WBC
PLATELET # BLD AUTO: 284 K/UL (ref 164–446)
PMV BLD AUTO: 8.9 FL (ref 9–12.9)
POTASSIUM SERPL-SCNC: 4.6 MMOL/L (ref 3.6–5.5)
PROT SERPL-MCNC: 7.7 G/DL (ref 6–8.2)
RBC # BLD AUTO: 4.58 M/UL (ref 4.2–5.4)
SODIUM SERPL-SCNC: 138 MMOL/L (ref 135–145)
T4 FREE SERPL-MCNC: 1.23 NG/DL (ref 0.93–1.7)
TRIGL SERPL-MCNC: 169 MG/DL (ref 0–149)
TSH SERPL DL<=0.005 MIU/L-ACNC: 1.4 UIU/ML (ref 0.38–5.33)
WBC # BLD AUTO: 7.3 K/UL (ref 4.8–10.8)

## 2023-03-08 PROCEDURE — 82043 UR ALBUMIN QUANTITATIVE: CPT

## 2023-03-08 PROCEDURE — 80061 LIPID PANEL: CPT

## 2023-03-08 PROCEDURE — 85025 COMPLETE CBC W/AUTO DIFF WBC: CPT

## 2023-03-08 PROCEDURE — 86803 HEPATITIS C AB TEST: CPT

## 2023-03-08 PROCEDURE — 84443 ASSAY THYROID STIM HORMONE: CPT

## 2023-03-08 PROCEDURE — 84439 ASSAY OF FREE THYROXINE: CPT

## 2023-03-08 PROCEDURE — 80053 COMPREHEN METABOLIC PANEL: CPT

## 2023-03-08 PROCEDURE — 36415 COLL VENOUS BLD VENIPUNCTURE: CPT

## 2023-03-08 PROCEDURE — 82570 ASSAY OF URINE CREATININE: CPT

## 2023-04-04 ENCOUNTER — NON-PROVIDER VISIT (OUTPATIENT)
Dept: MEDICAL GROUP | Facility: PHYSICIAN GROUP | Age: 53
End: 2023-04-04
Payer: COMMERCIAL

## 2023-04-04 PROCEDURE — 99401 PREV MED CNSL INDIV APPRX 15: CPT | Performed by: STUDENT IN AN ORGANIZED HEALTH CARE EDUCATION/TRAINING PROGRAM

## 2023-04-04 NOTE — PROGRESS NOTES
Patient Consult Note - Follow Up Visit  Primary care physician: CORRY Herr    Reason for consult: Management of Uncontrolled Type 2 Diabetes    Time IN:  8:43am  Time OUT:  9:02am    HPI:  Dora Sheldon is a 52 y.o. old patient who comes in today for evaluation of above stated problem.    Most Recent HbA1c:   Lab Results   Component Value Date/Time    HBA1C 7.8 (A) 02/22/2023 09:21 AM        Current Diabetes Regimen:    SGLT-2 Inhibitor:  Empagliflozin 25 mg once daily   Metformin 1000mg BID    Before Breakfast: did not bring logs today  Before Lunch:  Before Dinner:  Before Bedtime:  Other times:  Hypoglycemia:  None    Diet/Exercise:  Has improved, patient is focusing more on her well being and health.  Decreasing alcohol intake, getting better sleep, and working on keeping carbs/sweets to minimum.  Exercise has increased, walking more frequently.    Foot Exam:  Monofilament exam - up to date, not conducted today    Preventative Management  BP regimen (ACE/ARB) - Lisinopril-HCTZ 20-25mg QD  ASA - none  Statin - Atorvastatin 10mg QD  Last Retinal Scan - 12/2022  Last Foot Exam - 5/2022  Last A1c -   Lab Results   Component Value Date/Time    HBA1C 7.8 (A) 02/22/2023 09:21 AM      Last Microalbuminuria - 3/2023     Latest Reference Range & Units 03/08/23 07:14   Micro Alb Creat Ratio 0 - 30 mg/g 13   Creatinine, Urine mg/dL 110.89   Microalbumin, Urine Random mg/dL 1.4       ROS:  Constitutional: No weight loss  Cardiac: No palpitations or racing heart  Resp: No shortness of breath  Neuro: No numbness or tinging in feet  Endo: No heat or cold intolerance, no polyuria or polydipsia  All other systems were reviewed and were negative.    Past Medical History:  Patient Active Problem List    Diagnosis Date Noted    IUD (intrauterine device) in place 12/27/2021    Multiple thyroid nodules 07/20/2018    Fatty liver 06/22/2018    Chronic dryness of both eyes 12/27/2016    Vitamin D deficiency  09/22/2016    TED on CPAP 03/23/2016    Hyperactivity (behavior)     Type 2 diabetes mellitus, without long-term current use of insulin (HCC)     Essential hypertension     Dyslipidemia        Past Surgical History:  Past Surgical History:   Procedure Laterality Date    HYSTEROSCOPY NOVASURE-2 2010    Dr. Sandoval    ABDOMINOPLASTY  1999    Dr. Ricci    TONSILLECTOMY         Allergies:  Patient has no known allergies.    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     Comment: IUD Dec 2016   Other Topics Concern    Not on file   Social History Narrative    Not on file     Social Determinants of Health     Financial Resource Strain: Not on file   Food Insecurity: Not on file   Transportation Needs: Not on file   Physical Activity: Not on file   Stress: Not on file   Social Connections: Not on file   Intimate Partner Violence: Not on file   Housing Stability: Not on file       Family History:  Family History   Problem Relation Age of Onset    Hypertension Mother     Hypertension Father     Heart Attack Father     Psychiatric Illness Paternal Uncle     Heart Disease Maternal Grandmother     Hypertension Maternal Grandmother     Heart Disease Maternal Grandfather     Hypertension Maternal Grandfather     Diabetes Maternal Uncle     Sleep Apnea Neg Hx        Medications:    Current Outpatient  Medications:     metformin (GLUCOPHAGE) 1000 MG tablet, Take 1 Tablet by mouth 2 times a day with meals., Disp: 180 Tablet, Rfl: 1    atorvastatin (LIPITOR) 10 MG Tab, Take 1 Tablet by mouth every day., Disp: 90 Tablet, Rfl: 3    lisinopril-hydrochlorothiazide (PRINZIDE) 20-25 MG per tablet, TAKE 1 TABLET BY MOUTH EVERY DAY, Disp: 90 Tablet, Rfl: 3    Blood Glucose Monitoring Suppl Device, Meter: Dispense Device of Insurance Preference. Sig. Use as directed for blood sugar monitoring. #1. NR., Disp: 1 Each, Rfl: 0    Blood Glucose Test Strips, Test strips order: Test strips for One Touch Ultra 2 meter. Sig: use twice daily and prn ssx high or low sugar #100 RF x 3, Disp: 100 Strip, Rfl: 3    Empagliflozin (JARDIANCE) 25 MG Tab, Take 1 Tablet by mouth every day., Disp: 180 Tablet, Rfl: 1    tretinoin (RETIN-A) 0.05 % cream, , Disp: , Rfl: 2    Triamcinolone Acetonide (NASACORT AQ NA), Spray  in nose., Disp: , Rfl:     Cholecalciferol (VITAMIN D PO), Take 5,000 Caps by mouth., Disp: , Rfl:     Lancets Misc, 1 Each by Does not apply route 3 times a day., Disp: 100 Each, Rfl: 2    Labs: Reviewed    Physical Examination:  Vital signs: There were no vitals taken for this visit. There is no height or weight on file to calculate BMI.  General: No apparent distress, cooperative  Eyes: No scleral icterus or discharge  ENMT: Normal on external inspection of nose, lips, normal thyroid exam  Neck: No abnormal masses on inspection  Resp: Normal effort, clear to auscultation bilaterally   CVS: Regular rate and rhythm, S1 S2 normal, no murmur   Extremities: No edema  Abdomen: abdominal obesity present  Neuro: Alert and oriented  Skin: No rash  Psych: Normal mood and affect, intact memory and able to make informed decisions    Assessment and Plan:    Patient is optimized on metformin and Jardiance.  A1c still above goal at 7.8.  She has been focusing more and more on personal well being.  Dietary habits have improved, alcohol intake  has decreased.  Exercise improving, walking on more frequent basis.  Still has good deal of life stresses, but handling much more efficiently.  Discussed need for additional glucose lowering agent.  She will investigate cost of GLP1a agent and file appeal with insurance to make sure affordable.    Will continue all current medications for now with intention of adding GLP1a once insurance coverage confirmed.  Continue to work on dietary habits.  Increase exercise as tolerated.    Follow Up:  Five weeks.    Thank you for allowing me to participate in the care of this patient.    Greyson Arreaga, PharmD, [facility]  04/04/23    CC:   CORRY Hrer

## 2023-04-17 RX ORDER — DULAGLUTIDE 0.75 MG/.5ML
1 INJECTION, SOLUTION SUBCUTANEOUS
Qty: 1.96 ML | Refills: 0 | Status: SHIPPED | OUTPATIENT
Start: 2023-04-17 | End: 2023-06-06

## 2023-04-17 RX ORDER — EMPAGLIFLOZIN, METFORMIN HYDROCHLORIDE 25; 1000 MG/1; MG/1
1 TABLET, EXTENDED RELEASE ORAL DAILY
Qty: 30 TABLET | Refills: 6 | Status: SHIPPED | OUTPATIENT
Start: 2023-04-17 | End: 2023-12-01 | Stop reason: SDUPTHER

## 2023-05-09 ENCOUNTER — NON-PROVIDER VISIT (OUTPATIENT)
Dept: MEDICAL GROUP | Facility: PHYSICIAN GROUP | Age: 53
End: 2023-05-09
Payer: COMMERCIAL

## 2023-05-09 PROCEDURE — 99401 PREV MED CNSL INDIV APPRX 15: CPT | Performed by: NURSE PRACTITIONER

## 2023-05-09 NOTE — PROGRESS NOTES
Patient Consult Note - Follow Up Visit  Primary care physician: CORRY Herr    Reason for consult: Management of Uncontrolled Type 2 Diabetes    Time IN:  8:22am  Time OUT:  8:48am    HPI:  Dora Sheldon is a 52 y.o. old patient who comes in today for evaluation of above stated problem.    Most Recent HbA1c:   Lab Results   Component Value Date/Time    HBA1C 7.8 (A) 02/22/2023 09:21 AM        Current Diabetes Regimen:  GLP-1 Agent: Dulaglutide 0.75 mg once weekly - did not start as of today, first injection given in office today  Synjardy XR 25/1000mg QD    Before Breakfast: has not been testing  Before Lunch:  Before Dinner:  Before Bedtime:  Other times:  Hypoglycemia:  None    Diet/Exercise:  Patient under good deal of stress since last visit, admits that dietary and exercise habits have not been optimal.    Foot Exam:  Monofilament exam - up to date, not conducted today     Preventative Management  BP regimen (ACE/ARB) - Lisinopril-HCTZ 20-25mg QD  ASA - none  Statin - Atorvastatin 10mg QD  Last Retinal Scan - 12/2022  Last Foot Exam - 5/2022  Last A1c -   Lab Results   Component Value Date/Time    HBA1C 7.8 (A) 02/22/2023 09:21 AM      Last Microalbuminuria - 3/2023       Latest Reference Range & Units 03/08/23 07:14   Micro Alb Creat Ratio 0 - 30 mg/g 13   Creatinine, Urine mg/dL 110.89   Microalbumin, Urine Random mg/dL 1.4        ROS:  Constitutional: No weight loss  Cardiac: No palpitations or racing heart  Resp: No shortness of breath  Neuro: No numbness or tinging in feet  Endo: No heat or cold intolerance, no polyuria or polydipsia  All other systems were reviewed and were negative.    Past Medical History:  Patient Active Problem List    Diagnosis Date Noted    IUD (intrauterine device) in place 12/27/2021    Multiple thyroid nodules 07/20/2018    Fatty liver 06/22/2018    Chronic dryness of both eyes 12/27/2016    Vitamin D deficiency 09/22/2016    TED on CPAP 03/23/2016     Hyperactivity (behavior)     Type 2 diabetes mellitus, without long-term current use of insulin (HCC)     Essential hypertension     Dyslipidemia        Past Surgical History:  Past Surgical History:   Procedure Laterality Date    HYSTEROSCOPY NOVASURE-2 2010    Dr. Sandoval    ABDOMINOPLASTY  1999    Dr. Ricci    TONSILLECTOMY         Allergies:  Patient has no known allergies.    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     Comment: IUD Dec 2016   Other Topics Concern    Not on file   Social History Narrative    Not on file     Social Determinants of Health     Financial Resource Strain: Not on file   Food Insecurity: Not on file   Transportation Needs: Not on file   Physical Activity: Not on file   Stress: Not on file   Social Connections: Not on file   Intimate Partner Violence: Not on file   Housing Stability: Not on file       Family History:  Family History   Problem Relation Age of Onset    Hypertension Mother     Hypertension Father     Heart Attack Father     Psychiatric Illness Paternal Uncle     Heart Disease Maternal Grandmother     Hypertension Maternal Grandmother     Heart Disease Maternal Grandfather     Hypertension Maternal Grandfather     Diabetes Maternal Uncle     Sleep Apnea Neg Hx        Medications:    Current Outpatient Medications:     Dulaglutide  (TRULICITY) 0.75 MG/0.5ML Solution Pen-injector, Inject 1 PEN. under the skin every 7 days., Disp: 1.96 mL, Rfl: 0    Empagliflozin-metFORMIN HCl ER (SYNJARDY XR)  MG TABLET SR 24 HR, Take 1 Tablet by mouth every day., Disp: 30 Tablet, Rfl: 6    atorvastatin (LIPITOR) 10 MG Tab, Take 1 Tablet by mouth every day., Disp: 90 Tablet, Rfl: 3    lisinopril-hydrochlorothiazide (PRINZIDE) 20-25 MG per tablet, TAKE 1 TABLET BY MOUTH EVERY DAY, Disp: 90 Tablet, Rfl: 3    Blood Glucose Monitoring Suppl Device, Meter: Dispense Device of Insurance Preference. Sig. Use as directed for blood sugar monitoring. #1. NR., Disp: 1 Each, Rfl: 0    Blood Glucose Test Strips, Test strips order: Test strips for One Touch Ultra 2 meter. Sig: use twice daily and prn ssx high or low sugar #100 RF x 3, Disp: 100 Strip, Rfl: 3    tretinoin (RETIN-A) 0.05 % cream, , Disp: , Rfl: 2    Triamcinolone Acetonide (NASACORT AQ NA), Spray  in nose., Disp: , Rfl:     Cholecalciferol (VITAMIN D PO), Take 5,000 Caps by mouth., Disp: , Rfl:     Lancets Misc, 1 Each by Does not apply route 3 times a day., Disp: 100 Each, Rfl: 2    Labs: Reviewed    Physical Examination:  Vital signs: There were no vitals taken for this visit. There is no height or weight on file to calculate BMI.  General: No apparent distress, cooperative  Eyes: No scleral icterus or discharge  ENMT: Normal on external inspection of nose, lips, normal thyroid exam  Neck: No abnormal masses on inspection  Resp: Normal effort, clear to auscultation bilaterally   CVS: Regular rate and rhythm, S1 S2 normal, no murmur   Extremities: No edema  Abdomen: abdominal obesity present  Neuro: Alert and oriented  Skin: No rash  Psych: Normal mood and affect, intact memory and able to make informed decisions    Assessment and Plan:    Patient is optimized on Jardiance and metformin, will be transitioning to Synjardy this week.  She did not start Trulicity after last visit as she was nervous  following counseling from pharmacist at [facility].  Discussed at length MOA, TROY's, and administration of Trulicity.  First dose administered here in clinic today.  No home FBG today, she will begin testing on daily basis.  D/t life stresses, no changes to diet and exercise habits.    Will continue with Trulicity 0.75mg SQ once weekly until next visit and plan to titrate to 1.5mg dose at that time.  Stressed importance of appropriate dietary habits and minimizing simple carbs, sweets, and alcohol intake.  Increase exercise.    Follow Up:  Four weeks.    Thank you for allowing me to participate in the care of this patient.    Greyson Arreaga, PharmD, BCACP  05/09/23    CC:   CORRY Herr

## 2023-05-24 ENCOUNTER — OFFICE VISIT (OUTPATIENT)
Dept: MEDICAL GROUP | Facility: PHYSICIAN GROUP | Age: 53
End: 2023-05-24
Payer: COMMERCIAL

## 2023-05-24 VITALS
TEMPERATURE: 96.8 F | DIASTOLIC BLOOD PRESSURE: 72 MMHG | SYSTOLIC BLOOD PRESSURE: 132 MMHG | BODY MASS INDEX: 30.3 KG/M2 | HEART RATE: 78 BPM | HEIGHT: 63 IN | OXYGEN SATURATION: 99 % | WEIGHT: 171 LBS

## 2023-05-24 DIAGNOSIS — E11.9 TYPE 2 DIABETES MELLITUS WITHOUT COMPLICATION, WITHOUT LONG-TERM CURRENT USE OF INSULIN (HCC): ICD-10-CM

## 2023-05-24 LAB
HBA1C MFR BLD: 7.7 % (ref ?–5.8)
POCT INT CON NEG: NEGATIVE
POCT INT CON POS: POSITIVE

## 2023-05-24 PROCEDURE — 83036 HEMOGLOBIN GLYCOSYLATED A1C: CPT | Performed by: NURSE PRACTITIONER

## 2023-05-24 PROCEDURE — 99214 OFFICE O/P EST MOD 30 MIN: CPT | Performed by: NURSE PRACTITIONER

## 2023-05-24 PROCEDURE — 3078F DIAST BP <80 MM HG: CPT | Performed by: NURSE PRACTITIONER

## 2023-05-24 PROCEDURE — 3075F SYST BP GE 130 - 139MM HG: CPT | Performed by: NURSE PRACTITIONER

## 2023-05-24 ASSESSMENT — FIBROSIS 4 INDEX: FIB4 SCORE: 0.62

## 2023-05-24 NOTE — PROGRESS NOTES
Subjective:     CC: Diabetes follow-up    HPI:   Dora presents today with the following:    Type 2 diabetes mellitus, without long-term current use of insulin (HCC)  Last A1C 7.8%. Started first dose of trulicity 0.75 mg on 5/9/2023 and transitioned to synjardy xr  mg daily. She has had 3 doses of trulicity. The first week her morning blood sugars were 160's, then 130's and yesterday 180 but had wine and pasta the night before. Pharmacotherapy appointment follow-up scheduled.     Past Medical History:   Diagnosis Date    Anxiety     Diabetes mellitus, type 2 (HCC)     Dyslipidemia     HTN (hypertension)     Hypertension     Obesity 5/22/2019    Sleep apnea        Social History     Tobacco Use    Smoking status: Never    Smokeless tobacco: Never   Vaping Use    Vaping Use: Never used   Substance Use Topics    Alcohol use: Yes     Alcohol/week: 4.2 - 8.4 oz     Types: 7 - 14 Glasses of wine per week     Comment: wine every other day    Drug use: No       Current Outpatient Medications Ordered in Epic   Medication Sig Dispense Refill    Dulaglutide (TRULICITY) 0.75 MG/0.5ML Solution Pen-injector Inject 1 PEN. under the skin every 7 days. 1.96 mL 0    Empagliflozin-metFORMIN HCl ER (SYNJARDY XR)  MG TABLET SR 24 HR Take 1 Tablet by mouth every day. 30 Tablet 6    atorvastatin (LIPITOR) 10 MG Tab Take 1 Tablet by mouth every day. 90 Tablet 3    lisinopril-hydrochlorothiazide (PRINZIDE) 20-25 MG per tablet TAKE 1 TABLET BY MOUTH EVERY DAY 90 Tablet 3    Blood Glucose Monitoring Suppl Device Meter: Dispense Device of Insurance Preference. Sig. Use as directed for blood sugar monitoring. #1. NR. 1 Each 0    Blood Glucose Test Strips Test strips order: Test strips for One Touch Ultra 2 meter. Sig: use twice daily and prn ssx high or low sugar #100 RF x 3 100 Strip 3    tretinoin (RETIN-A) 0.05 % cream   2    Triamcinolone Acetonide (NASACORT AQ NA) Spray  in nose.      Cholecalciferol (VITAMIN D PO) Take  "5,000 Caps by mouth.      Lancets Misc 1 Each by Does not apply route 3 times a day. 100 Each 2     No current Epic-ordered facility-administered medications on file.       Allergies:  Patient has no known allergies.    Health Maintenance: Reviewed. Declines pneumonia vaccine today.      Objective:     Vital signs reviewed  Exam:  /72 (BP Location: Right arm, Patient Position: Sitting, BP Cuff Size: Adult)   Pulse 78   Temp 36 °C (96.8 °F) (Temporal)   Ht 1.6 m (5' 3\")   Wt 77.6 kg (171 lb)   SpO2 99%   BMI 30.29 kg/m²  Body mass index is 30.29 kg/m².    Gen: Alert and oriented, No apparent distress.  Lungs: Normal effort, CTA bilaterally, no wheezes, rhonchi, or rales  CV: Regular rate and rhythm. No murmurs, rubs, or gallops.  Ext: No clubbing, cyanosis, edema.    Monofilament testing with a 10 gram force: sensation intact: intact bilaterally  Visual Inspection: Feet without maceration, ulcers, fissures. Dry heels.   Pedal pulses: intact bilaterally    Labs:        Latest Reference Range & Units 05/24/23 07:21   Glycohemoglobin 5.8 % 7.7 !   !: Data is abnormal    Assessment & Plan:     52 y.o. female with the following -     1. Type 2 diabetes mellitus without complication, without long-term current use of insulin (HCC)  Chronic exacerbated problem.  A1c remains elevated at 7.7%.  We will recheck in 3 months.  Continue follow-up with pharmacotherapy.  Continue with Trulicity 0.75 mg weekly and Synjardy XR  mg daily.  Continue with healthy diet and exercise.  - POCT  A1C  - Diabetic Monofilament LE Exam      Return in about 3 months (around 8/24/2023) for Diabetes, A1C.    Please note that this dictation was created using voice recognition software. I have made every reasonable attempt to correct obvious errors, but I expect that there are errors of grammar and possibly content that I did not discover before finalizing the note.        "

## 2023-05-24 NOTE — ASSESSMENT & PLAN NOTE
Last A1C 7.8%. Started first dose of trulicity 0.75 mg on 5/9/2023 and transitioned to synjardy xr  mg daily. She has had 3 doses of trulicity. The first week her morning blood sugars were 160's, then 130's and yesterday 180 but had wine and pasta the night before. Pharmacotherapy appointment follow-up scheduled.

## 2023-06-06 ENCOUNTER — NON-PROVIDER VISIT (OUTPATIENT)
Dept: MEDICAL GROUP | Facility: PHYSICIAN GROUP | Age: 53
End: 2023-06-06
Payer: COMMERCIAL

## 2023-06-06 PROCEDURE — 99401 PREV MED CNSL INDIV APPRX 15: CPT | Performed by: NURSE PRACTITIONER

## 2023-06-06 RX ORDER — DULAGLUTIDE 1.5 MG/.5ML
1 INJECTION, SOLUTION SUBCUTANEOUS
Qty: 2.24 ML | Refills: 6 | Status: SHIPPED | OUTPATIENT
Start: 2023-06-06 | End: 2024-01-05

## 2023-06-06 NOTE — PROGRESS NOTES
Patient Consult Note - Follow Up Visit  Primary care physician: CORRY Herr    Reason for consult: Management of Uncontrolled Type 2 Diabetes    Time IN:  7:25am  Time OUT:  7:43am    HPI:  Dora Sheldon is a 52 y.o. old patient who comes in today for evaluation of above stated problem.    Most Recent HbA1c:   Lab Results   Component Value Date/Time    HBA1C 7.7 (A) 05/24/2023 07:21 AM        Current Diabetes Regimen:  GLP-1 Agent: Dulaglutide 0.75 mg once weekly     Metformin + SGLT-2 Inhibitor: Synjardy XR 25-1000mg QD      Before Breakfast: 130-140's when starting Trulicity, has increased some since with diet and work stress  Before Lunch:  Before Dinner:  Before Bedtime:  Other times:  Hypoglycemia:  None    Diet/Exercise:  Mindful of portion control.  Testing FBG on more regular basis and able to see real time feedback on dietary habits from evening prior.  A touch of nausea with wine and pastas since starting Trulicity, beginning to recognize signs.  Exercise sporadic recently based on work schedule.    Foot Exam:  Monofilament exam - up to date, not conducted today    Preventative Management  BP regimen (ACE/ARB) - Lisinopril-HCTZ 20-25mg QD  ASA - none  Statin - Atorvastatin 10mg QD  Last Retinal Scan - 12/2022  Last Foot Exam - 5/2023  Last A1c -   Lab Results   Component Value Date/Time    HBA1C 7.7 (A) 05/24/2023 07:21 AM      Last Microalbuminuria - 3/2023    Latest Reference Range & Units 03/08/23 07:14   Micro Alb Creat Ratio 0 - 30 mg/g 13   Creatinine, Urine mg/dL 110.89   Microalbumin, Urine Random mg/dL 1.4        ROS:  Constitutional: No weight loss  Cardiac: No palpitations or racing heart  Resp: No shortness of breath  Neuro: No numbness or tinging in feet  Endo: No heat or cold intolerance, no polyuria or polydipsia  All other systems were reviewed and were negative.    Past Medical History:  Patient Active Problem List    Diagnosis Date Noted    IUD (intrauterine device)  in place 12/27/2021    Multiple thyroid nodules 07/20/2018    Fatty liver 06/22/2018    Chronic dryness of both eyes 12/27/2016    Vitamin D deficiency 09/22/2016    TED on CPAP 03/23/2016    Hyperactivity (behavior)     Type 2 diabetes mellitus, without long-term current use of insulin (HCC)     Essential hypertension     Dyslipidemia        Past Surgical History:  Past Surgical History:   Procedure Laterality Date    HYSTEROSCOPY NOVASURE-2  2010    Dr. Sandoval    ABDOMINOPLASTY  1999    Dr. Ricci    TONSILLECTOMY         Allergies:  Patient has no known allergies.    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     Comment: IUD Dec 2016   Other Topics Concern    Not on file   Social History Narrative    Not on file     Social Determinants of Health     Financial Resource Strain: Not on file   Food Insecurity: Not on file   Transportation Needs: Not on file   Physical Activity: Sufficiently Active (12/27/2021)    Exercise Vital Sign     Days of Exercise per Week: 3 days     Minutes of Exercise per Session: 50 min   Stress: No Stress Concern Present (12/27/2021)    Citizen of [place] of Occupational [employer] - Occupational Stress Questionnaire     Feeling of Stress : Only a little   Social Connections: Unknown (12/27/2021)    Social Connection and Isolation Panel [NHANES]     Frequency of  Communication with Friends and Family: Not on file     Frequency of Social Gatherings with Friends and Family: Not on file     Attends Anabaptism Services: Not on file     Active Member of Clubs or Organizations: No     Attends Club or Organization Meetings: Not on file     Marital Status: Not on file   Intimate Partner Violence: Not on file   Housing Stability: Not on file       Family History:  Family History   Problem Relation Age of Onset    Hypertension Mother     Hypertension Father     Heart Attack Father     Psychiatric Illness Paternal Uncle     Heart Disease Maternal Grandmother     Hypertension Maternal Grandmother     Heart Disease Maternal Grandfather     Hypertension Maternal Grandfather     Diabetes Maternal Uncle     Sleep Apnea Neg Hx        Medications:    Current Outpatient Medications:     Dulaglutide (TRULICITY) 0.75 MG/0.5ML Solution Pen-injector, Inject 1 PEN. under the skin every 7 days., Disp: 1.96 mL, Rfl: 0    Empagliflozin-metFORMIN HCl ER (SYNJARDY XR)  MG TABLET SR 24 HR, Take 1 Tablet by mouth every day., Disp: 30 Tablet, Rfl: 6    atorvastatin (LIPITOR) 10 MG Tab, Take 1 Tablet by mouth every day., Disp: 90 Tablet, Rfl: 3    lisinopril-hydrochlorothiazide (PRINZIDE) 20-25 MG per tablet, TAKE 1 TABLET BY MOUTH EVERY DAY, Disp: 90 Tablet, Rfl: 3    Blood Glucose Monitoring Suppl Device, Meter: Dispense Device of Insurance Preference. Sig. Use as directed for blood sugar monitoring. #1. NR., Disp: 1 Each, Rfl: 0    Blood Glucose Test Strips, Test strips order: Test strips for One Touch Ultra 2 meter. Sig: use twice daily and prn ssx high or low sugar #100 RF x 3, Disp: 100 Strip, Rfl: 3    tretinoin (RETIN-A) 0.05 % cream, , Disp: , Rfl: 2    Triamcinolone Acetonide (NASACORT AQ NA), Spray  in nose., Disp: , Rfl:     Cholecalciferol (VITAMIN D PO), Take 5,000 Caps by mouth., Disp: , Rfl:     Lancets Misc, 1 Each by Does not apply route 3 times a day., Disp: 100 Each, Rfl:  2    Labs: Reviewed    Physical Examination:  Vital signs: There were no vitals taken for this visit. There is no height or weight on file to calculate BMI.  General: No apparent distress, cooperative  Eyes: No scleral icterus or discharge  ENMT: Normal on external inspection of nose, lips, normal thyroid exam  Neck: No abnormal masses on inspection  Resp: Normal effort, clear to auscultation bilaterally   CVS: Regular rate and rhythm, S1 S2 normal, no murmur   Extremities: No edema  Abdomen: abdominal obesity present  Neuro: Alert and oriented  Skin: No rash  Psych: Normal mood and affect, intact memory and able to make informed decisions    Assessment and Plan:    Patient is optimized on Synjardy.  Tolerating initial dosing of Trulicity.  States that her FBG had improved for the first couple weeks following Trulicity, but have increased a bit secondary to diet habits and work stresses.  Keeping good portion control.  Recognizing impact of food on FBG and attempting to adjust habits.  Exercise sporadic recently d/t work schedule.    INCREASE Trulicity to 1.5mg SQ once weekly.  Continue all other medications for now.  Continue to be mindful of dietary habits, minimizing simple carbs/sweets, and practicing good portion control.  Increase exercise as tolerated.    Follow Up:  [facility]    Thank you for allowing me to participate in the care of this patient.    Greyson Arreaga, PharmD, [facility]  06/06/23    CC:   CORRY Herr

## 2023-06-14 ENCOUNTER — PATIENT MESSAGE (OUTPATIENT)
Dept: MEDICAL GROUP | Facility: PHYSICIAN GROUP | Age: 53
End: 2023-06-14
Payer: COMMERCIAL

## 2023-06-14 DIAGNOSIS — M25.69 BACK STIFFNESS: ICD-10-CM

## 2023-07-25 ENCOUNTER — NON-PROVIDER VISIT (OUTPATIENT)
Dept: MEDICAL GROUP | Facility: PHYSICIAN GROUP | Age: 53
End: 2023-07-25
Payer: COMMERCIAL

## 2023-07-25 PROCEDURE — 99402 PREV MED CNSL INDIV APPRX 30: CPT | Performed by: NURSE PRACTITIONER

## 2023-07-25 NOTE — PROGRESS NOTES
Patient Consult Note - Follow Up Visit  Primary care physician: CORRY Herr    Reason for consult: Management of Uncontrolled Type 2 Diabetes    Time IN:  7:42am  Time OUT:  8:13am    HPI:  Dora Sheldon is a 52 y.o. old patient who comes in today for evaluation of above stated problem.    Most Recent HbA1c:   Lab Results   Component Value Date/Time    HBA1C 7.7 (A) 05/24/2023 07:21 AM        Current Diabetes Regimen:  GLP-1 Agent: Dulaglutide 1.5 mg once weekly    Metformin + SGLT-2 Inhibitor: Synjardy XR 25-1000mg QD    Previously attempted medications  Glipizide - unknown reason for d/c    Before Breakfast: left meter in car and malfunctioned, working on getting new one  Before Lunch:  Before Dinner:  Before Bedtime:  Other times:  Hypoglycemia:  None    Diet/Exercise:  Continues to have great portion control, endorses early satiety with most meals.  Feels she is doing better cutting back on wine consumption as well.  Continues to have exceedingly high stress levels at work that have influence on diet and exercise habits.  No consistent exercise in place.    Foot Exam:  Monofilament exam - up to date, not conducted today     Preventative Management  BP regimen (ACE/ARB) - Lisinopril-HCTZ 20-25mg QD  ASA - none  Statin - Atorvastatin 10mg QD  Last Retinal Scan - 12/2022  Last Foot Exam - 5/2023  Last A1c -   Lab Results   Component Value Date/Time    HBA1C 7.7 (A) 05/24/2023 07:21 AM      Last Microalbuminuria - 3/2023    Latest Reference Range & Units 03/08/23 07:14   Micro Alb Creat Ratio 0 - 30 mg/g 13   Creatinine, Urine mg/dL 110.89   Microalbumin, Urine Random mg/dL 1.4        ROS:  Constitutional: No weight loss  Cardiac: No palpitations or racing heart  Resp: No shortness of breath  Neuro: No numbness or tinging in feet  Endo: No heat or cold intolerance, no polyuria or polydipsia  All other systems were reviewed and were negative.    Past Medical History:  Patient Active Problem List     Diagnosis Date Noted    IUD (intrauterine device) in place 12/27/2021    Multiple thyroid nodules 07/20/2018    Fatty liver 06/22/2018    Chronic dryness of both eyes 12/27/2016    Vitamin D deficiency 09/22/2016    TED on CPAP 03/23/2016    Hyperactivity (behavior)     Type 2 diabetes mellitus, without long-term current use of insulin (HCC)     Essential hypertension     Dyslipidemia        Past Surgical History:  Past Surgical History:   Procedure Laterality Date    HYSTEROSCOPY NOVASURE-2  2010    Dr. Sandoval    ABDOMINOPLASTY  1999    Dr. Ricci    TONSILLECTOMY         Allergies:  Patient has no known allergies.    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[employer]     Employer: OTHER   Tobacco Use    Smoking status: Never    Smokeless tobacco: Never   Vaping Use    Vaping Use: Never used   Substance and Sexual Activity    Alcohol use: Yes     Alcohol/week: 4.2 - 8.4 oz     Types: 7 - 14 Glasses of wine per week     Comment: wine every other day    Drug use: No    Sexual activity: Yes     Partners: Male     Birth control/protection: I.U.D.     Comment: IUD Dec 2016   Other Topics Concern    Not on file   Social History Narrative    Not on file     Social Determinants of Health     Financial Resource Strain: Not on file   Food Insecurity: Not on file   Transportation Needs: Not on file   Physical Activity: Sufficiently Active (12/27/2021)    Exercise Vital Sign     Days of Exercise per Week: 3 days     Minutes of Exercise per Session: 50 min   Stress: No Stress Concern Present (12/27/2021)    [employer] [place] [employer] - Occupational Stress Questionnaire     Feeling of Stress : Only a little   Social Connections: Unknown (12/27/2021)    Social  Connection and Isolation Panel [NHANES]     Frequency of Communication with Friends and Family: Not on file     Frequency of Social Gatherings with Friends and Family: Not on file     Attends Bahai Services: Not on file     Active Member of Clubs or Organizations: No     Attends Club or Organization Meetings: Not on file     Marital Status: Not on file   Intimate Partner Violence: Not on file   Housing Stability: Not on file       Family History:  Family History   Problem Relation Age of Onset    Hypertension Mother     Hypertension Father     Heart Attack Father     Psychiatric Illness Paternal Uncle     Heart Disease Maternal Grandmother     Hypertension Maternal Grandmother     Heart Disease Maternal Grandfather     Hypertension Maternal Grandfather     Diabetes Maternal Uncle     Sleep Apnea Neg Hx        Medications:    Current Outpatient Medications:     Dulaglutide (TRULICITY) 1.5 MG/0.5ML Solution Pen-injector, Inject 1 Pen under the skin every 7 days., Disp: 2.24 mL, Rfl: 6    Empagliflozin-metFORMIN HCl ER (SYNJARDY XR)  MG TABLET SR 24 HR, Take 1 Tablet by mouth every day., Disp: 30 Tablet, Rfl: 6    atorvastatin (LIPITOR) 10 MG Tab, Take 1 Tablet by mouth every day., Disp: 90 Tablet, Rfl: 3    lisinopril-hydrochlorothiazide (PRINZIDE) 20-25 MG per tablet, TAKE 1 TABLET BY MOUTH EVERY DAY, Disp: 90 Tablet, Rfl: 3    Blood Glucose Monitoring Suppl Device, Meter: Dispense Device of Insurance Preference. Sig. Use as directed for blood sugar monitoring. #1. NR., Disp: 1 Each, Rfl: 0    Blood Glucose Test Strips, Test strips order: Test strips for One Touch Ultra 2 meter. Sig: use twice daily and prn ssx high or low sugar #100 RF x 3, Disp: 100 Strip, Rfl: 3    tretinoin (RETIN-A) 0.05 % cream, , Disp: , Rfl: 2    Triamcinolone Acetonide (NASACORT AQ NA), Spray  in nose., Disp: , Rfl:     Cholecalciferol (VITAMIN D PO), Take 5,000 Caps by mouth., Disp: , Rfl:     Lancets Misc, 1 Each by Does not  apply route 3 times a day., Disp: 100 Each, Rfl: 2    Labs: Reviewed    Physical Examination:  Vital signs: There were no vitals taken for this visit. There is no height or weight on file to calculate BMI.  General: No apparent distress, cooperative  Eyes: No scleral icterus or discharge  ENMT: Normal on external inspection of nose, lips, normal thyroid exam  Neck: No abnormal masses on inspection  Resp: Normal effort, clear to auscultation bilaterally   CVS: Regular rate and rhythm, S1 S2 normal, no murmur   Extremities: No edema  Abdomen: abdominal obesity present  Neuro: Alert and oriented  Skin: No rash  Psych: Normal mood and affect, intact memory and able to make informed decisions    Assessment and Plan:    Patient is optimized on Synjardy.  Tolerating dose increase with Trulicity.  Home FBG were trending down to 120's, but has not been able to test recently d/t meter breaking.  Diet continues to slowly improve, endorses early satiety with dose increase in Trulicity.  Wine consumption is down as well.  No dedicated exercise routine in place.  As above, continues to work a very high stress job with long hours that is most certainly impacting health status.    Will continue all current medications for now.  Discussed further optimization of Trulicity based on next A1c.  Continue to be mindful on portion sizes and simple carb intake.  Limit alcohol consumption.  Increase exercise.    Follow Up:  Four weeks with PCP, this clinic in eight weeks    Thank you for allowing me to participate in the care of this patient.    Greyson Arreaga, PharmD, [facility]  07/25/23    CC:   CORRY Herr

## 2023-08-30 ENCOUNTER — OFFICE VISIT (OUTPATIENT)
Dept: MEDICAL GROUP | Facility: PHYSICIAN GROUP | Age: 53
End: 2023-08-30
Payer: COMMERCIAL

## 2023-08-30 VITALS
BODY MASS INDEX: 29.06 KG/M2 | WEIGHT: 164 LBS | DIASTOLIC BLOOD PRESSURE: 80 MMHG | SYSTOLIC BLOOD PRESSURE: 136 MMHG | HEIGHT: 63 IN | HEART RATE: 96 BPM | OXYGEN SATURATION: 99 % | TEMPERATURE: 97.6 F

## 2023-08-30 DIAGNOSIS — E11.9 TYPE 2 DIABETES MELLITUS WITHOUT COMPLICATION, WITHOUT LONG-TERM CURRENT USE OF INSULIN (HCC): ICD-10-CM

## 2023-08-30 LAB
HBA1C MFR BLD: 6.4 % (ref ?–5.8)
POCT INT CON NEG: NEGATIVE
POCT INT CON POS: POSITIVE

## 2023-08-30 PROCEDURE — 99213 OFFICE O/P EST LOW 20 MIN: CPT | Performed by: NURSE PRACTITIONER

## 2023-08-30 PROCEDURE — 83036 HEMOGLOBIN GLYCOSYLATED A1C: CPT | Performed by: NURSE PRACTITIONER

## 2023-08-30 PROCEDURE — 3075F SYST BP GE 130 - 139MM HG: CPT | Performed by: NURSE PRACTITIONER

## 2023-08-30 PROCEDURE — 3079F DIAST BP 80-89 MM HG: CPT | Performed by: NURSE PRACTITIONER

## 2023-08-30 ASSESSMENT — FIBROSIS 4 INDEX: FIB4 SCORE: 0.63

## 2023-08-30 NOTE — ASSESSMENT & PLAN NOTE
Last A1c 7.7%.  She has been following up with pharmacotherapy.  Due for A1c today.  She continues Synjardy XR  mg daily and Trulicity 1.5 mg weekly.  She is on atorvastatin and lisinopril-hydrochlorothiazide.  Diabetes health maintenance up-to-date. Morning blood sugars [id number]. She is walking and kayaking. Eating smaller portions. She has been having stomach pains and rash with the Trulicity. States she has discussed with pharmacotherapy.

## 2023-08-30 NOTE — PROGRESS NOTES
Subjective:     CC: Diabetes follow-up    HPI:   Dora presents today with the following:    Type 2 diabetes mellitus, without long-term current use of insulin (HCC)  Last A1c 7.7%.  She has been following up with pharmacotherapy.  Due for A1c today.  She continues Synjardy XR  mg daily and Trulicity 1.5 mg weekly.  She is on atorvastatin and lisinopril-hydrochlorothiazide.  Diabetes health maintenance up-to-date. Morning blood sugars [id number]. She is walking and kayaking. Eating smaller portions. She has been having stomach pains and rash with the Trulicity. States she has discussed with pharmacotherapy.     Past Medical History:   Diagnosis Date    Anxiety     Diabetes mellitus, type 2 (HCC)     Dyslipidemia     HTN (hypertension)     Hypertension     Obesity 5/22/2019    Sleep apnea        Social History     Tobacco Use    Smoking status: Never    Smokeless tobacco: Never   Vaping Use    Vaping Use: Never used   Substance Use Topics    Alcohol use: Yes     Alcohol/week: 4.2 - 8.4 oz     Types: 7 - 14 Glasses of wine per week     Comment: wine every other day    Drug use: No       Current Outpatient Medications Ordered in Epic   Medication Sig Dispense Refill    Dulaglutide (TRULICITY) 1.5 MG/0.5ML Solution Pen-injector Inject 1 Pen under the skin every 7 days. 2.24 mL 6    Empagliflozin-metFORMIN HCl ER (SYNJARDY XR)  MG TABLET SR 24 HR Take 1 Tablet by mouth every day. 30 Tablet 6    atorvastatin (LIPITOR) 10 MG Tab Take 1 Tablet by mouth every day. 90 Tablet 3    lisinopril-hydrochlorothiazide (PRINZIDE) 20-25 MG per tablet TAKE 1 TABLET BY MOUTH EVERY DAY 90 Tablet 3    Blood Glucose Monitoring Suppl Device Meter: Dispense Device of Insurance Preference. Sig. Use as directed for blood sugar monitoring. #1. NR. 1 Each 0    Blood Glucose Test Strips Test strips order: Test strips for One Touch Ultra 2 meter. Sig: use twice daily and prn ssx high or low sugar #100 RF x 3 100 Strip 3     "tretinoin (RETIN-A) 0.05 % cream   2    Triamcinolone Acetonide (NASACORT AQ NA) Spray  in nose.      Cholecalciferol (VITAMIN D PO) Take 5,000 Caps by mouth.      Lancets Misc 1 Each by Does not apply route 3 times a day. 100 Each 2     No current Epic-ordered facility-administered medications on file.       Allergies:  Patient has no known allergies.    Health Maintenance: Reviewed      Objective:     Vital signs reviewed  Exam:  /80 (BP Location: Right arm, Patient Position: Sitting, BP Cuff Size: Adult)   Pulse 96   Temp 36.4 °C (97.6 °F) (Temporal)   Ht 1.6 m (5' 3\")   Wt 74.4 kg (164 lb)   SpO2 99%   BMI 29.05 kg/m²  Body mass index is 29.05 kg/m².    Gen: Alert and oriented, No apparent distress. Irritable.  Neck: Neck is supple, full ROM.  Lungs: Normal effort, no audible wheezes  CV: Skin pink, warm and dry.  Ext: No clubbing, cyanosis, edema.      Labs:      Latest Reference Range & Units 08/30/23 07:21   Glycohemoglobin 5.8 % 6.4 !   !: Data is abnormal    Assessment & Plan:     53 y.o. female with the following -     1. Type 2 diabetes mellitus without complication, without long-term current use of insulin ([facility])  Chronic stable problem.  A1c is improved to 6.4%.  Continue Synjardy XR  mg daily and Trulicity 1.5 mg weekly.  Patient was late for her 7 AM appointment and fortunately we were able to move her to an open 720 appointment slot.  MA did discuss importance of arriving on time and unfortunately patient was not happy with the answer.  During our visit patient ended the visit early stating that she can get her A1c online and left the room.  She does have an upcoming appointment scheduled pharmacotherapy on 10/17/2023.  - POCT  A1C        Return if symptoms worsen or fail to improve.    Please note that this dictation was created using voice recognition software. I have made every reasonable attempt to correct obvious errors, but I expect that there are errors of grammar and " possibly content that I did not discover before finalizing the note.

## 2023-09-05 ENCOUNTER — TELEPHONE (OUTPATIENT)
Dept: URGENT CARE | Facility: PHYSICIAN GROUP | Age: 53
End: 2023-09-05
Payer: COMMERCIAL

## 2023-09-05 NOTE — LETTER
9/5/2023            Dora Sheldon  [address] [place] [address]                Dear Dora,    Your care is very important to us, and we have noticed that on 08/30/2023, you missed your appointment with Kelly WHEATLEY at [facility]       We’re committed to providing you with the best care possible. Your appointment time is reserved for you and your provider to discuss any current or new health concerns and, together, determine the best plan of care for you. Please call [phone] to reschedule at your earliest convenience.        In some cases, [facility] offers additional resources to make your healthcare more accessible, including transportation assistance, financial assistance and virtual visits. To learn more about these resources, please call [phone].       In order to keep you as informed as possible, below is a brief summary of our policy regarding missed appointments:        If a patient “No Shows”??three (3) or more appointments within a rolling 12-month           period, they may be dismissed from the practice for failure to follow clinician      recommendations.     If you have any concerns regarding the care you are receiving, please talk with your provider or call the office at [phone] and request to speak with the Practice . We’re committed to providing excellent care, and your feedback is invaluable.          Sincerely,  Kelly WHEATLEY        Ketoconazole Counseling:   Patient counseled regarding improving absorption with orange juice.  Adverse effects include but are not limited to breast enlargement, headache, diarrhea, nausea, upset stomach, liver function test abnormalities, taste disturbance, and stomach pain.  There is a rare possibility of liver failure that can occur when taking ketoconazole. The patient understands that monitoring of LFTs may be required, especially at baseline. The patient verbalized understanding of the proper use and possible adverse effects of ketoconazole.  All of the patient's questions and concerns were addressed.

## 2023-10-16 ENCOUNTER — DOCUMENTATION (OUTPATIENT)
Dept: MEDICAL GROUP | Facility: PHYSICIAN GROUP | Age: 53
End: 2023-10-16
Payer: COMMERCIAL

## 2023-10-16 NOTE — PROGRESS NOTES
S/w patient to reschedule upcoming appointment as A1c not due and POCT machine not functioning.  Rescheduled appt to 11-28-23.  Greyson Arreaga, PharmD, BCACP

## 2023-10-17 ENCOUNTER — APPOINTMENT (OUTPATIENT)
Dept: MEDICAL GROUP | Facility: PHYSICIAN GROUP | Age: 53
End: 2023-10-17
Payer: COMMERCIAL

## 2023-11-06 ENCOUNTER — OFFICE VISIT (OUTPATIENT)
Dept: SLEEP MEDICINE | Facility: MEDICAL CENTER | Age: 53
End: 2023-11-06
Attending: NURSE PRACTITIONER
Payer: COMMERCIAL

## 2023-11-06 VITALS
OXYGEN SATURATION: 97 % | HEIGHT: 63 IN | BODY MASS INDEX: 30.48 KG/M2 | DIASTOLIC BLOOD PRESSURE: 70 MMHG | HEART RATE: 77 BPM | SYSTOLIC BLOOD PRESSURE: 118 MMHG | RESPIRATION RATE: 16 BRPM | WEIGHT: 172 LBS

## 2023-11-06 DIAGNOSIS — Z78.0 MENOPAUSE: ICD-10-CM

## 2023-11-06 DIAGNOSIS — G47.33 OSA ON CPAP: Chronic | ICD-10-CM

## 2023-11-06 DIAGNOSIS — I10 ESSENTIAL HYPERTENSION: ICD-10-CM

## 2023-11-06 DIAGNOSIS — Z78.9 NONSMOKER: ICD-10-CM

## 2023-11-06 PROCEDURE — 99212 OFFICE O/P EST SF 10 MIN: CPT | Performed by: NURSE PRACTITIONER

## 2023-11-06 PROCEDURE — 3078F DIAST BP <80 MM HG: CPT | Performed by: NURSE PRACTITIONER

## 2023-11-06 PROCEDURE — 99213 OFFICE O/P EST LOW 20 MIN: CPT | Performed by: NURSE PRACTITIONER

## 2023-11-06 PROCEDURE — 3074F SYST BP LT 130 MM HG: CPT | Performed by: NURSE PRACTITIONER

## 2023-11-06 ASSESSMENT — PATIENT HEALTH QUESTIONNAIRE - PHQ9: CLINICAL INTERPRETATION OF PHQ2 SCORE: 0

## 2023-11-06 ASSESSMENT — FIBROSIS 4 INDEX: FIB4 SCORE: 0.63

## 2023-11-06 NOTE — PROGRESS NOTES
Chief Complaint   Patient presents with    Follow-Up     Apnea // last seen 11/9/2022       HPI:  Dora Sheldon is a 53 y.o. year old female here today for follow-up on TED.  Last OV 11/9/22     Currently using CPAP @ 11cm H20 nightly; RESMED; device obtained 2022.   Compliance report 10/7/2023 through 11/5/2020 indicates 100% compliance, average nightly 7 hours 25 minutes, moderate mask leak with a reduced AHI of 0.3/h.  Reviewed with patient.  Tolerates mask and pressure well.  She denies morning headaches.  She feels she sleeps well on therapy but does continue to manage many obstacles in her life with increased stress in life changes.  She was placed on Trulicity due to her elevated blood sugars and this has come down significantly.  She is trying to also exercise when time allows.  She is watching her diet closely.  She denies cardiac or respiratory symptoms.  She notes stress to be the major impact on her life and is also going through menopause with night sweats and hot flashes at any given time.    Sleep hx:  Prior Dr. Agarwal patient diagnosed in 2016 with home sleep study noting AHI 37/h.  She was placed on CPAP 11 cm.         ROS: As per HPI and otherwise negative if not stated.    Past Medical History:   Diagnosis Date    Anxiety     Diabetes mellitus, type 2 (HCC)     Dyslipidemia     HTN (hypertension)     Hypertension     Obesity 5/22/2019    Sleep apnea        Past Surgical History:   Procedure Laterality Date    HYSTEROSCOPY NOVASURE-2  2010    Dr. Sandoval    ABDOMINOPLASTY  1999    Dr. Ricci    TONSILLECTOMY         Family History   Problem Relation Age of Onset    Hypertension Mother     Hypertension Father     Heart Attack Father     Psychiatric Illness Paternal Uncle     Heart Disease Maternal Grandmother     Hypertension Maternal Grandmother     Heart Disease Maternal Grandfather     Hypertension Maternal Grandfather     Diabetes Maternal Uncle     Sleep Apnea Neg Hx        Social History  "    Socioeconomic History    Marital status:      Spouse name: Not on file    Number of children: 2D    Years of education: 2y college    Highest education level: Not on file   Occupational History    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[employer]     Employer: OTHER   Tobacco Use    Smoking status: Never    Smokeless tobacco: Never   Vaping Use    Vaping Use: Never used   Substance and Sexual Activity    Alcohol use: Yes     Alcohol/week: 4.2 - 8.4 oz     Types: 7 - 14 Glasses of wine per week     Comment: wine every other day    Drug use: No    Sexual activity: Yes     Partners: Male     Birth control/protection: I.U.D.     Comment: IUD Dec 2016   Other Topics Concern    Not on file   Social History Narrative    Not on file     Social Determinants of Health     Financial Resource Strain: Not on file   Food Insecurity: Not on file   Transportation Needs: Not on file   Physical Activity: Sufficiently Active (12/27/2021)    Exercise Vital Sign     Days of Exercise per Week: 3 days     Minutes of Exercise per Session: 50 min   Stress: No Stress Concern Present (12/27/2021)    [employer] [place] [employer] - Occupational Stress Questionnaire     Feeling of Stress : Only a little   Social Connections: Unknown (12/27/2021)    Social Connection and Isolation Panel [NHANES]     Frequency of Communication with Friends and Family: Not on file     Frequency of Social Gatherings with Friends and Family: Not on file     Attends Christian Services: Not on file     Active Member of Clubs or Organizations: No     Attends Club or Organization Meetings: Not on file     Marital Status: Not on file   Intimate Partner Violence: Not on file   Housing Stability: Not on file       Allergies as of 11/06/2023    (No Known Allergies)        Vitals:  /70 (BP Location: Left arm, Patient Position: Sitting, BP Cuff Size: Adult)   Pulse 77   Resp 16   Ht 1.6 m (5' 3\")   Wt 78 kg (172 lb)   SpO2 97% "     Current medications as of today   Current Outpatient Medications   Medication Sig Dispense Refill    Dulaglutide (TRULICITY) 1.5 MG/0.5ML Solution Pen-injector Inject 1 Pen under the skin every 7 days. 2.24 mL 6    Empagliflozin-metFORMIN HCl ER (SYNJARDY XR)  MG TABLET SR 24 HR Take 1 Tablet by mouth every day. 30 Tablet 6    atorvastatin (LIPITOR) 10 MG Tab Take 1 Tablet by mouth every day. 90 Tablet 3    lisinopril-hydrochlorothiazide (PRINZIDE) 20-25 MG per tablet TAKE 1 TABLET BY MOUTH EVERY DAY 90 Tablet 3    Blood Glucose Monitoring Suppl Device Meter: Dispense Device of Insurance Preference. Sig. Use as directed for blood sugar monitoring. #1. NR. 1 Each 0    Blood Glucose Test Strips Test strips order: Test strips for One Touch Ultra 2 meter. Sig: use twice daily and prn ssx high or low sugar #100 RF x 3 100 Strip 3    tretinoin (RETIN-A) 0.05 % cream   2    Triamcinolone Acetonide (NASACORT AQ NA) Spray  in nose.      Cholecalciferol (VITAMIN D PO) Take 5,000 Caps by mouth.      Lancets Misc 1 Each by Does not apply route 3 times a day. 100 Each 2     No current facility-administered medications for this visit.         Physical Exam:   Gen:           Alert and oriented, No apparent distress. Mood and affect appropriate, normal interaction with examiner.  Eyes:          PERRL, EOM intact, sclere white, conjunctive moist.  Ears:          Not examined.   Hearing:     Grossly intact.  Nose:          Normal, no lesions or deformities.  Dentition:    Not examined.   Oropharynx:   Not examined.   Mallampati Classification: Not examined.   Neck:        Supple, trachea midline, no masses.  Respiratory Effort: No intercostal retractions or use of accessory muscles.   Lung Auscultation:      Clear to auscultation bilaterally; no rales, rhonchi or wheezing.  CV:            Regular rate and rhythm. No murmurs, rubs or gallops.  Abd:           Not examined.   Lymphadenopathy: Not examined.  Gait and  Station: Normal.  Digits and Nails: No clubbing, cyanosis, petechiae, or nodes.   Cranial Nerves: II-XII grossly intact.  Skin:        No rashes, lesions or ulcers noted.               Ext:           No cyanosis or edema.      Assessment:  1. TED on CPAP  DME Mask and Supplies      2. Menopause        3. Essential hypertension        4. BMI 30.0-30.9,adult  HEIGHT AND WEIGHT      5. Nonsmoker            Immunizations:    Flu:will obtain  Pneumovax 23:not due  Prevnar 13:not due  PCV 20: will debate but attempting to control blood sugars and will review with PCP  COVID-19: will obtain    Plan:  TED is well controlled.  She will continue to benefit from CPAP 11 cm nightly.   DME mask/supplies  Follow-up with gynecology for ongoing management of menopausal symptoms.  Follow-up with primary care for ongoing management hypertension blood sugars.  Encourage weight loss or healthy diet and increase exercise  Follow-up in 1 year with compliance report, sooner if needed.    Please note that this dictation was created using voice recognition software. I have made every reasonable attempt to correct obvious errors, but it is possible there are errors of grammar and possibly content that I did not discover before finalizing the note.

## 2023-11-28 ENCOUNTER — NON-PROVIDER VISIT (OUTPATIENT)
Dept: MEDICAL GROUP | Facility: PHYSICIAN GROUP | Age: 53
End: 2023-11-28
Payer: COMMERCIAL

## 2023-11-28 DIAGNOSIS — E11.9 TYPE 2 DIABETES MELLITUS WITHOUT COMPLICATION, WITHOUT LONG-TERM CURRENT USE OF INSULIN (HCC): ICD-10-CM

## 2023-11-28 LAB
HBA1C MFR BLD: 7.1 % (ref ?–5.8)
POCT INT CON NEG: NEGATIVE
POCT INT CON POS: POSITIVE

## 2023-11-28 PROCEDURE — 99401 PREV MED CNSL INDIV APPRX 15: CPT | Performed by: NURSE PRACTITIONER

## 2023-11-28 PROCEDURE — 83036 HEMOGLOBIN GLYCOSYLATED A1C: CPT | Performed by: NURSE PRACTITIONER

## 2023-11-28 RX ORDER — DULAGLUTIDE 3 MG/.5ML
1 INJECTION, SOLUTION SUBCUTANEOUS
Qty: 2 ML | Refills: 6 | Status: SHIPPED | OUTPATIENT
Start: 2023-11-28 | End: 2024-03-05

## 2023-11-28 NOTE — PROGRESS NOTES
Patient Consult Note - Follow Up Visit  Primary care physician: CORRY Herr    Reason for consult: Management of Uncontrolled Type 2 Diabetes    Time IN:  7:24am  Time OUT:  7:48am    HPI:  Dora Sheldon is a 53 y.o. old patient who comes in today for evaluation of above stated problem.    Most Recent HbA1c:   Lab Results   Component Value Date/Time    HBA1C 6.4 (A) 08/30/2023 07:21 AM        Current Diabetes Regimen:  GLP-1 Agent: Dulaglutide 1.5 mg once weekly    Metformin + SGLT-2 Inhibitor: Synjardy XR 25-1000mg QD     Previously attempted medications  Glipizide - unknown reason for d/c    Before Breakfast:  mid to high 120s, highest 140 on occasion  Before Lunch:  Before Dinner:  Before Bedtime:  Other times:  Hypoglycemia:  None    Diet/Exercise:  Admits to worsening nutrition habits over the last month and a half.  Several vacations and holiday treats have been her biggest problem.   Has been journaling meal times and trying to make better food choices and timing in the evening hours.  Exercise has decreased with colder weather and time change.    Preventative Management  BP regimen (ACEi/ARB): Lisinopril-HCTZ 20-25mg QD  BP <140/90: Yes  Statin: atorvastatin (Lipitor) 10 mg daily  LDL <100: No  Lab Results   Component Value Date/Time    CHOLSTRLTOT 176 03/08/2023 07:15 AM     (H) 03/08/2023 07:15 AM    HDL 40 03/08/2023 07:15 AM    TRIGLYCERIDE 169 (H) 03/08/2023 07:15 AM       Last Microalbumin/Cr:  Lab Results   Component Value Date/Time    MALBCRT 13 03/08/2023 07:14 AM    MICROALBUR 1.4 03/08/2023 07:14 AM     Last A1c:  Lab Results   Component Value Date/Time    HBA1C 6.4 (A) 08/30/2023 07:21 AM      Last Retinal Scan: 12/2022    Monofilament exam: up to date, 5/2023     ROS:  Constitutional: No weight loss  Cardiac: No palpitations or racing heart  Resp: No shortness of breath  Neuro: No numbness or tinging in feet  Endo: No heat or cold intolerance, no polyuria or  polydipsia  All other systems were reviewed and were negative.    Past Medical History:  Patient Active Problem List    Diagnosis Date Noted    Menopause 11/06/2023    IUD (intrauterine device) in place 12/27/2021    Multiple thyroid nodules 07/20/2018    Fatty liver 06/22/2018    Chronic dryness of both eyes 12/27/2016    Vitamin D deficiency 09/22/2016    TED on CPAP 03/23/2016    Hyperactivity (behavior)     Type 2 diabetes mellitus, without long-term current use of insulin (HCC)     Essential hypertension     Dyslipidemia        Past Surgical History:  Past Surgical History:   Procedure Laterality Date    HYSTEROSCOPY NOVASURE-2  2010    Dr. Sandoval    ABDOMINOPLASTY  1999    Dr. Ricci    TONSILLECTOMY         Allergies:  Patient has no known allergies.    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[employer]     Employer: OTHER   Tobacco Use    Smoking status: Never    Smokeless tobacco: Never   Vaping Use    Vaping Use: Never used   Substance and Sexual Activity    Alcohol use: Yes     Alcohol/week: 4.2 - 8.4 oz     Types: 7 - 14 Glasses of wine per week     Comment: wine every other day    Drug use: No    Sexual activity: Yes     Partners: Male     Birth control/protection: I.U.D.     Comment: IUD Dec 2016   Other Topics Concern    Not on file   Social History Narrative    Not on file     Social Determinants of Health     Financial Resource Strain: Not on file   Food Insecurity: Not on file   Transportation Needs: Not on file   Physical Activity: Sufficiently Active (12/27/2021)    Exercise Vital Sign     Days of Exercise per Week: 3 days     Minutes of Exercise per Session: 50 min   Stress: No Stress Concern Present (12/27/2021)    [facility] of  Occupational Health - Occupational Stress Questionnaire     Feeling of Stress : Only a little   Social Connections: Unknown (12/27/2021)    Social Connection and Isolation Panel [NHANES]     Frequency of Communication with Friends and Family: Not on file     Frequency of Social Gatherings with Friends and Family: Not on file     Attends Temple Services: Not on file     Active Member of Clubs or Organizations: No     Attends Club or Organization Meetings: Not on file     Marital Status: Not on file   Intimate Partner Violence: Not on file   Housing Stability: Not on file       Family History:  Family History   Problem Relation Age of Onset    Hypertension Mother     Hypertension Father     Heart Attack Father     Psychiatric Illness Paternal Uncle     Heart Disease Maternal Grandmother     Hypertension Maternal Grandmother     Heart Disease Maternal Grandfather     Hypertension Maternal Grandfather     Diabetes Maternal Uncle     Sleep Apnea Neg Hx        Medications:    Current Outpatient Medications:     Dulaglutide (TRULICITY) 1.5 MG/0.5ML Solution Pen-injector, Inject 1 Pen under the skin every 7 days., Disp: 2.24 mL, Rfl: 6    Empagliflozin-metFORMIN HCl ER (SYNJARDY XR)  MG TABLET SR 24 HR, Take 1 Tablet by mouth every day., Disp: 30 Tablet, Rfl: 6    atorvastatin (LIPITOR) 10 MG Tab, Take 1 Tablet by mouth every day., Disp: 90 Tablet, Rfl: 3    lisinopril-hydrochlorothiazide (PRINZIDE) 20-25 MG per tablet, TAKE 1 TABLET BY MOUTH EVERY DAY, Disp: 90 Tablet, Rfl: 3    Blood Glucose Monitoring Suppl Device, Meter: Dispense Device of Insurance Preference. Sig. Use as directed for blood sugar monitoring. #1. NR., Disp: 1 Each, Rfl: 0    Blood Glucose Test Strips, Test strips order: Test strips for One Touch Ultra 2 meter. Sig: use twice daily and prn ssx high or low sugar #100 RF x 3, Disp: 100 Strip, Rfl: 3    tretinoin (RETIN-A) 0.05 % cream, , Disp: , Rfl: 2    Triamcinolone Acetonide (NASACORT AQ NA),  Spray  in nose., Disp: , Rfl:     Cholecalciferol (VITAMIN D PO), Take 5,000 Caps by mouth., Disp: , Rfl:     Lancets Misc, 1 Each by Does not apply route 3 times a day., Disp: 100 Each, Rfl: 2    Labs: Reviewed    Physical Examination:  Vital signs: There were no vitals taken for this visit. There is no height or weight on file to calculate BMI.  General: No apparent distress, cooperative  Eyes: No scleral icterus or discharge  ENMT: Normal on external inspection of nose, lips, normal thyroid exam  Neck: No abnormal masses on inspection  Resp: Normal effort, clear to auscultation bilaterally   CVS: Regular rate and rhythm, S1 S2 normal, no murmur   Extremities: No edema  Abdomen: abdominal obesity present  Neuro: Alert and oriented  Skin: No rash  Psych: Normal mood and affect, intact memory and able to make informed decisions    Assessment and Plan:    Patient is optimized on Synjarday.  Tolerating current dosing of Trulicity.  Home FBG slightly above goal, a bit worse than last visit.  A1c has increased as well, now slightly above goal at 7.1%.  Patient admits that last month and a half have been rough, diet has not been as good, and exercise has decreased significantly with colder weather and time change.    Discussed further optimization of Trulicity along with increase in overall exercise frequency.    INCREASE Trulicity to 3mg SQ once weekly.  Continue all other medications for now.  Be mindful of nutrition choices during holiday season, focus on finishing last meal of the day at least 3 hours prior to bedtime.  Increase exercise frequency.    Follow Up:  Four weeks.    Thank you for allowing me to participate in the care of this patient.    Greyson Arreaga, PharmD, BCACP  11/28/23    CC:   CORRY Herr

## 2023-12-01 DIAGNOSIS — I10 ESSENTIAL HYPERTENSION: ICD-10-CM

## 2023-12-01 DIAGNOSIS — E78.5 DYSLIPIDEMIA: ICD-10-CM

## 2023-12-01 RX ORDER — LISINOPRIL AND HYDROCHLOROTHIAZIDE 25; 20 MG/1; MG/1
1 TABLET ORAL
Qty: 90 TABLET | Refills: 3 | Status: SHIPPED | OUTPATIENT
Start: 2023-12-01 | End: 2024-03-14 | Stop reason: SDUPTHER

## 2023-12-01 RX ORDER — EMPAGLIFLOZIN, METFORMIN HYDROCHLORIDE 25; 1000 MG/1; MG/1
1 TABLET, EXTENDED RELEASE ORAL DAILY
Qty: 90 TABLET | Refills: 3 | Status: SHIPPED | OUTPATIENT
Start: 2023-12-01 | End: 2024-03-14 | Stop reason: SDUPTHER

## 2023-12-01 RX ORDER — ATORVASTATIN CALCIUM 10 MG/1
10 TABLET, FILM COATED ORAL
Qty: 90 TABLET | Refills: 3 | Status: SHIPPED | OUTPATIENT
Start: 2023-12-01 | End: 2024-03-14 | Stop reason: SDUPTHER

## 2023-12-01 NOTE — TELEPHONE ENCOUNTER
Requested Prescriptions     Pending Prescriptions Disp Refills    lisinopril-hydrochlorothiazide (PRINZIDE) 20-25 MG per tablet 90 Tablet 3     Sig: Take 1 Tablet by mouth every day.    atorvastatin (LIPITOR) 10 MG Tab 90 Tablet 3     Sig: Take 1 Tablet by mouth every day.    Empagliflozin-metFORMIN HCl ER (SYNJARDY XR)  MG TABLET SR 24 HR 90 Tablet 3     Sig: Take 1 Tablet by mouth every day.

## 2024-01-02 ENCOUNTER — OFFICE VISIT (OUTPATIENT)
Dept: MEDICAL GROUP | Facility: PHYSICIAN GROUP | Age: 54
End: 2024-01-02
Payer: COMMERCIAL

## 2024-01-02 VITALS — HEART RATE: 76 BPM | RESPIRATION RATE: 15 BRPM | HEIGHT: 63 IN | BODY MASS INDEX: 30.48 KG/M2

## 2024-01-02 DIAGNOSIS — E55.9 VITAMIN D DEFICIENCY: ICD-10-CM

## 2024-01-02 DIAGNOSIS — E11.9 TYPE 2 DIABETES MELLITUS WITHOUT COMPLICATION, WITHOUT LONG-TERM CURRENT USE OF INSULIN (HCC): ICD-10-CM

## 2024-01-02 DIAGNOSIS — E78.5 DYSLIPIDEMIA: ICD-10-CM

## 2024-01-02 PROCEDURE — 99401 PREV MED CNSL INDIV APPRX 15: CPT | Performed by: NURSE PRACTITIONER

## 2024-01-02 NOTE — PROGRESS NOTES
Patient Consult Note - Follow Up Visit  Primary care physician: CORRY Herr    Reason for consult: Management of Uncontrolled Type 2 Diabetes    Time IN:  7:24am  Time OUT:  7:45am    HPI:  Dora Sheldon is a 53 y.o. old patient who comes in today for evaluation of above stated problem.    Most Recent HbA1c:   Lab Results   Component Value Date/Time    HBA1C 7.1 (A) 11/28/2023 07:27 AM        Current Diabetes Regimen:  GLP-1 Agent: Trulicity 3mg SQ once weekly  Metformin + SGLT-2 Inhibitor: Synjardy XR 25-1000mg QD    Previously attempted medications  Glipizide - unknown reason for d/c    Before Breakfast:  Before Lunch:  Before Dinner:  Before Bedtime:  Other times:  Hypoglycemia:  None    Diet/Exercise:  Holidays, stress, and vacation have hampered some progress from a nutrition and exercise standpoint.  Feels that dose increase from Trulicity has been beneficial from an appetite suppression standpoint and did see some additional weight loss at recent OB/GYN visit.  Exercise has been down, but getting back to things this weeek.    Preventative Management  BP regimen (ACEi/ARB): Lisinopril-HCTZ 20-25mg QD  BP <140/90: Yes  Statin: atorvastatin (Lipitor) 10 mg daily  LDL <100: No  Lab Results   Component Value Date/Time    CHOLSTRLTOT 176 03/08/2023 07:15 AM     (H) 03/08/2023 07:15 AM    HDL 40 03/08/2023 07:15 AM    TRIGLYCERIDE 169 (H) 03/08/2023 07:15 AM       Last Microalbumin/Cr:  Lab Results   Component Value Date/Time    MALBCRT 13 03/08/2023 07:14 AM    MICROALBUR 1.4 03/08/2023 07:14 AM     Last A1c:  Lab Results   Component Value Date/Time    HBA1C 7.1 (A) 11/28/2023 07:27 AM      Last Retinal Scan: 12/2022    Monofilament exam: up to date, 5/2023     ROS:  Constitutional: No weight loss  Cardiac: No palpitations or racing heart  Resp: No shortness of breath  Neuro: No numbness or tinging in feet  Endo: No heat or cold intolerance, no polyuria or polydipsia  All other systems  were reviewed and were negative.    Past Medical History:  Patient Active Problem List    Diagnosis Date Noted    Menopause 11/06/2023    IUD (intrauterine device) in place 12/27/2021    Multiple thyroid nodules 07/20/2018    Fatty liver 06/22/2018    Chronic dryness of both eyes 12/27/2016    Vitamin D deficiency 09/22/2016    TED on CPAP 03/23/2016    Hyperactivity (behavior)     Type 2 diabetes mellitus, without long-term current use of insulin (HCC)     Essential hypertension     Dyslipidemia        Past Surgical History:  Past Surgical History:   Procedure Laterality Date    HYSTEROSCOPY NOVASURE-2  2010    Dr. Sandoval    ABDOMINOPLASTY  1999    Dr. Ricci    TONSILLECTOMY         Allergies:  Patient has no known allergies.    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     Comment: IUD Dec 2016   Other Topics Concern    Not on file   Social History Narrative    Not on file     Social Determinants of Health     Financial Resource Strain: Not on file   Food Insecurity: Not on file   Transportation Needs: Not on file   Physical Activity: Sufficiently Active (12/27/2021)    Exercise Vital Sign     Days of Exercise per Week: 3 days     Minutes of Exercise per Session: 50 min   Stress: No Stress Concern Present (12/27/2021)    [place] of Occupational Health - Occupational Stress  Questionnaire     Feeling of Stress : Only a little   Social Connections: Unknown (12/27/2021)    Social Connection and Isolation Panel [NHANES]     Frequency of Communication with Friends and Family: Not on file     Frequency of Social Gatherings with Friends and Family: Not on file     Attends Hinduism Services: Not on file     Active Member of Clubs or Organizations: No     Attends Club or Organization Meetings: Not on file     Marital Status: Not on file   Intimate Partner Violence: Not on file   Housing Stability: Not on file       Family History:  Family History   Problem Relation Age of Onset    Hypertension Mother     Hypertension Father     Heart Attack Father     Psychiatric Illness Paternal Uncle     Heart Disease Maternal Grandmother     Hypertension Maternal Grandmother     Heart Disease Maternal Grandfather     Hypertension Maternal Grandfather     Diabetes Maternal Uncle     Sleep Apnea Neg Hx        Medications:    Current Outpatient Medications:     lisinopril-hydrochlorothiazide (PRINZIDE) 20-25 MG per tablet, Take 1 Tablet by mouth every day., Disp: 90 Tablet, Rfl: 3    atorvastatin (LIPITOR) 10 MG Tab, Take 1 Tablet by mouth every day., Disp: 90 Tablet, Rfl: 3    Empagliflozin-metFORMIN HCl ER (SYNJARDY XR)  MG TABLET SR 24 HR, Take 1 Tablet by mouth every day., Disp: 90 Tablet, Rfl: 3    Dulaglutide (TRULICITY) 3 MG/0.5ML Solution Pen-injector, Inject 1 Pen under the skin every 7 days., Disp: 2 mL, Rfl: 6    Dulaglutide (TRULICITY) 1.5 MG/0.5ML Solution Pen-injector, Inject 1 Pen under the skin every 7 days., Disp: 2.24 mL, Rfl: 6    Blood Glucose Monitoring Suppl Device, Meter: Dispense Device of Insurance Preference. Sig. Use as directed for blood sugar monitoring. #1. NR., Disp: 1 Each, Rfl: 0    Blood Glucose Test Strips, Test strips order: Test strips for One Touch Ultra 2 meter. Sig: use twice daily and prn ssx high or low sugar #100 RF x 3, Disp: 100 Strip, Rfl: 3    tretinoin  (RETIN-A) 0.05 % cream, , Disp: , Rfl: 2    Triamcinolone Acetonide (NASACORT AQ NA), Spray  in nose., Disp: , Rfl:     Cholecalciferol (VITAMIN D PO), Take 5,000 Caps by mouth., Disp: , Rfl:     Lancets Misc, 1 Each by Does not apply route 3 times a day., Disp: 100 Each, Rfl: 2    Labs: Reviewed    Physical Examination:  Vital signs: There were no vitals taken for this visit. There is no height or weight on file to calculate BMI.  General: No apparent distress, cooperative  Eyes: No scleral icterus or discharge  ENMT: Normal on external inspection of nose, lips, normal thyroid exam  Neck: No abnormal masses on inspection  Resp: Normal effort, clear to auscultation bilaterally   CVS: Regular rate and rhythm, S1 S2 normal, no murmur   Extremities: No edema  Abdomen: abdominal obesity present  Neuro: Alert and oriented  Skin: No rash  Psych: Normal mood and affect, intact memory and able to make informed decisions    Assessment and Plan:    Patient is optimized on Synjardy.  Tolerating dose increase on Trulicity.  Has not been diligent with testing home FBG, will be getting back to normal routines this week.  As above, nutrition habits have been off with holidays, stress at work, and vacations.  She does endorse a bit more appetite suppression with increase in Trulicity dosing.  Exercise still down from previous months.    Will continue all current medications for now.  Stressed importance of getting back to appropriate nutrition habits, minimizing alcohol intake, and focusing and regular moderate intensity exercise.  Labs ordered to be completed prior to next visit - Lipid Panel, Vitamin D, CMP, and A1c    Follow Up:  Eight weeks to review labs.    Thank you for allowing me to participate in the care of this patient.    Greyson Arreaga, PharmD, BCACP  01/02/24    CC:   CORRY Herr

## 2024-01-05 RX ORDER — DULAGLUTIDE 1.5 MG/.5ML
1 INJECTION, SOLUTION SUBCUTANEOUS
Qty: 2.24 ML | Refills: 0 | Status: SHIPPED | OUTPATIENT
Start: 2024-01-05 | End: 2024-02-05 | Stop reason: SDUPTHER

## 2024-01-05 RX ORDER — DULAGLUTIDE 3 MG/.5ML
1 INJECTION, SOLUTION SUBCUTANEOUS
Refills: 6 | OUTPATIENT
Start: 2024-01-05

## 2024-02-06 ENCOUNTER — APPOINTMENT (RX ONLY)
Dept: URBAN - METROPOLITAN AREA CLINIC 22 | Facility: CLINIC | Age: 54
Setting detail: DERMATOLOGY
End: 2024-02-06

## 2024-02-06 DIAGNOSIS — D22 MELANOCYTIC NEVI: ICD-10-CM

## 2024-02-06 DIAGNOSIS — Z71.89 OTHER SPECIFIED COUNSELING: ICD-10-CM

## 2024-02-06 DIAGNOSIS — L85.1 ACQUIRED KERATOSIS [KERATODERMA] PALMARIS ET PLANTARIS: ICD-10-CM

## 2024-02-06 DIAGNOSIS — L81.1 CHLOASMA: ICD-10-CM

## 2024-02-06 DIAGNOSIS — L81.4 OTHER MELANIN HYPERPIGMENTATION: ICD-10-CM

## 2024-02-06 PROBLEM — D22.22 MELANOCYTIC NEVI OF LEFT EAR AND EXTERNAL AURICULAR CANAL: Status: ACTIVE | Noted: 2024-02-06

## 2024-02-06 PROBLEM — D22.5 MELANOCYTIC NEVI OF TRUNK: Status: ACTIVE | Noted: 2024-02-06

## 2024-02-06 PROBLEM — D22.4 MELANOCYTIC NEVI OF SCALP AND NECK: Status: ACTIVE | Noted: 2024-02-06

## 2024-02-06 PROBLEM — D22.21 MELANOCYTIC NEVI OF RIGHT EAR AND EXTERNAL AURICULAR CANAL: Status: ACTIVE | Noted: 2024-02-06

## 2024-02-06 PROCEDURE — 99213 OFFICE O/P EST LOW 20 MIN: CPT

## 2024-02-06 PROCEDURE — ? SUNSCREEN TREATMENT REGIMEN

## 2024-02-06 PROCEDURE — ? TREATMENT REGIMEN

## 2024-02-06 PROCEDURE — ? COUNSELING

## 2024-02-06 ASSESSMENT — LOCATION ZONE DERM
LOCATION ZONE: FEET
LOCATION ZONE: EAR
LOCATION ZONE: TRUNK
LOCATION ZONE: ARM
LOCATION ZONE: SCALP
LOCATION ZONE: FACE

## 2024-02-06 ASSESSMENT — LOCATION SIMPLE DESCRIPTION DERM
LOCATION SIMPLE: LEFT EAR
LOCATION SIMPLE: RIGHT EAR
LOCATION SIMPLE: UPPER BACK
LOCATION SIMPLE: LEFT FOREARM
LOCATION SIMPLE: RIGHT CHEEK
LOCATION SIMPLE: RIGHT FOREARM
LOCATION SIMPLE: SCALP
LOCATION SIMPLE: INFERIOR FOREHEAD
LOCATION SIMPLE: LEFT CHEEK
LOCATION SIMPLE: RIGHT HEEL
LOCATION SIMPLE: RIGHT FOREHEAD
LOCATION SIMPLE: LEFT HEEL

## 2024-02-06 ASSESSMENT — LOCATION DETAILED DESCRIPTION DERM
LOCATION DETAILED: LEFT HEEL
LOCATION DETAILED: RIGHT HEEL
LOCATION DETAILED: LEFT INFERIOR HELIX
LOCATION DETAILED: INFERIOR MID FOREHEAD
LOCATION DETAILED: LEFT CENTRAL MALAR CHEEK
LOCATION DETAILED: RIGHT SUPERIOR HELIX
LOCATION DETAILED: SUPERIOR THORACIC SPINE
LOCATION DETAILED: RIGHT MEDIAL FOREHEAD
LOCATION DETAILED: LEFT VENTRAL PROXIMAL FOREARM
LOCATION DETAILED: RIGHT LATERAL MALAR CHEEK
LOCATION DETAILED: RIGHT VENTRAL PROXIMAL FOREARM
LOCATION DETAILED: LEFT POSTERIOR EAR
LOCATION DETAILED: LEFT CENTRAL PARIETAL SCALP

## 2024-02-07 NOTE — TELEPHONE ENCOUNTER
Received request via: Patient    Was the patient seen in the last year in this department? Yes    Does the patient have an active prescription (recently filled or refills available) for medication(s) requested? No    Pharmacy Name: [facility]    Does the patient have USP Plus and need 100 day supply (blood pressure, diabetes and cholesterol meds only)? Patient does not have SCP

## 2024-02-08 RX ORDER — DULAGLUTIDE 1.5 MG/.5ML
1 INJECTION, SOLUTION SUBCUTANEOUS
Qty: 2.24 ML | Refills: 0 | Status: SHIPPED | OUTPATIENT
Start: 2024-02-08 | End: 2024-03-05

## 2024-03-04 ENCOUNTER — HOSPITAL ENCOUNTER (OUTPATIENT)
Dept: LAB | Facility: MEDICAL CENTER | Age: 54
End: 2024-03-04
Attending: NURSE PRACTITIONER
Payer: COMMERCIAL

## 2024-03-04 DIAGNOSIS — E55.9 VITAMIN D DEFICIENCY: ICD-10-CM

## 2024-03-04 DIAGNOSIS — E11.9 TYPE 2 DIABETES MELLITUS WITHOUT COMPLICATION, WITHOUT LONG-TERM CURRENT USE OF INSULIN (HCC): ICD-10-CM

## 2024-03-04 DIAGNOSIS — E78.5 DYSLIPIDEMIA: ICD-10-CM

## 2024-03-04 LAB
25(OH)D3 SERPL-MCNC: 61 NG/ML (ref 30–100)
ALBUMIN SERPL BCP-MCNC: 4.2 G/DL (ref 3.2–4.9)
ALBUMIN/GLOB SERPL: 1.4 G/DL
ALP SERPL-CCNC: 69 U/L (ref 30–99)
ALT SERPL-CCNC: 22 U/L (ref 2–50)
ANION GAP SERPL CALC-SCNC: 15 MMOL/L (ref 7–16)
AST SERPL-CCNC: 14 U/L (ref 12–45)
BILIRUB SERPL-MCNC: 0.7 MG/DL (ref 0.1–1.5)
BUN SERPL-MCNC: 22 MG/DL (ref 8–22)
CALCIUM ALBUM COR SERPL-MCNC: 9.1 MG/DL (ref 8.5–10.5)
CALCIUM SERPL-MCNC: 9.3 MG/DL (ref 8.5–10.5)
CHLORIDE SERPL-SCNC: 102 MMOL/L (ref 96–112)
CHOLEST SERPL-MCNC: 118 MG/DL (ref 100–199)
CO2 SERPL-SCNC: 22 MMOL/L (ref 20–33)
CREAT SERPL-MCNC: 0.78 MG/DL (ref 0.5–1.4)
EST. AVERAGE GLUCOSE BLD GHB EST-MCNC: 151 MG/DL
FASTING STATUS PATIENT QL REPORTED: NORMAL
GFR SERPLBLD CREATININE-BSD FMLA CKD-EPI: 90 ML/MIN/1.73 M 2
GLOBULIN SER CALC-MCNC: 2.9 G/DL (ref 1.9–3.5)
GLUCOSE SERPL-MCNC: 134 MG/DL (ref 65–99)
HBA1C MFR BLD: 6.9 % (ref 4–5.6)
HDLC SERPL-MCNC: 43 MG/DL
LDLC SERPL CALC-MCNC: 58 MG/DL
POTASSIUM SERPL-SCNC: 3.9 MMOL/L (ref 3.6–5.5)
PROT SERPL-MCNC: 7.1 G/DL (ref 6–8.2)
SODIUM SERPL-SCNC: 139 MMOL/L (ref 135–145)
TRIGL SERPL-MCNC: 87 MG/DL (ref 0–149)

## 2024-03-04 PROCEDURE — 82306 VITAMIN D 25 HYDROXY: CPT

## 2024-03-04 PROCEDURE — 80053 COMPREHEN METABOLIC PANEL: CPT

## 2024-03-04 PROCEDURE — 83036 HEMOGLOBIN GLYCOSYLATED A1C: CPT

## 2024-03-04 PROCEDURE — 36415 COLL VENOUS BLD VENIPUNCTURE: CPT

## 2024-03-04 PROCEDURE — 80061 LIPID PANEL: CPT

## 2024-03-05 ENCOUNTER — OFFICE VISIT (OUTPATIENT)
Dept: MEDICAL GROUP | Facility: PHYSICIAN GROUP | Age: 54
End: 2024-03-05
Payer: COMMERCIAL

## 2024-03-05 VITALS — BODY MASS INDEX: 30.48 KG/M2 | HEART RATE: 81 BPM | RESPIRATION RATE: 14 BRPM | OXYGEN SATURATION: 97 % | HEIGHT: 63 IN

## 2024-03-05 DIAGNOSIS — E11.9 TYPE 2 DIABETES MELLITUS WITHOUT COMPLICATION, WITHOUT LONG-TERM CURRENT USE OF INSULIN (HCC): ICD-10-CM

## 2024-03-05 PROCEDURE — 99401 PREV MED CNSL INDIV APPRX 15: CPT | Performed by: NURSE PRACTITIONER

## 2024-03-05 RX ORDER — DULAGLUTIDE 1.5 MG/.5ML
1 INJECTION, SOLUTION SUBCUTANEOUS
Qty: 6.72 ML | Refills: 3 | Status: SHIPPED | OUTPATIENT
Start: 2024-03-05 | End: 2024-03-19 | Stop reason: RX

## 2024-03-05 NOTE — PROGRESS NOTES
Patient Consult Note - Follow Up Visit  Primary care physician: CORRY Herr    Reason for consult: Management of Uncontrolled Type 2 Diabetes    Time IN:  7:27am  Time OUT:  7:48am    HPI:  Dora Sheldon is a 53 y.o. old patient who comes in today for evaluation of above stated problem.    Most Recent HbA1c:   Lab Results   Component Value Date/Time    HBA1C 6.9 (H) 03/04/2024 07:04 AM        Current Diabetes Regimen:  GLP-1 Agent: Trulicity 3mg SQ once weekly - has been without and dosing 1.5mg strength for last few weeks.  Metformin + SGLT-2 Inhibitor: Synjardy XR 25-1000mg QD     Previously attempted medications  Glipizide - unknown reason for d/c    Discussed FBG goal of , 2hrPP <180, and A1c <7.0%.  Before Breakfast: no logs, but testing mostly in 120s  Before Lunch:  Before Dinner:  Before Bedtime:  Other times:  Hypoglycemia:  None    Diet/Exercise:  Still experiencing high stress levels at work.  States she has been making overall improvements with nutrition habits, eating earlier in the evening, drinking less wine, and focusing on minimizing simple carbs.  Exercise has diminished some, but plans to get back to more consistently walking as spring nears.    Preventative Management  BP regimen (ACEi/ARB): Lisinopril-HCTZ 20-25mg QD  BP <140/90: Yes  Statin: atorvastatin (Lipitor) 10 mg daily  LDL <100: Yes  Lab Results   Component Value Date/Time    CHOLSTRLTOT 118 03/04/2024 07:04 AM    LDL 58 03/04/2024 07:04 AM    HDL 43 03/04/2024 07:04 AM    TRIGLYCERIDE 87 03/04/2024 07:04 AM       Last Microalbumin/Cr:  Lab Results   Component Value Date/Time    MALBCRT 13 03/08/2023 07:14 AM    MICROALBUR 1.4 03/08/2023 07:14 AM     Last A1c:  Lab Results   Component Value Date/Time    HBA1C 6.9 (H) 03/04/2024 07:04 AM      Last Retinal Scan: 12/2022    Monofilament exam: up to date, 5/2023, not conducted today     ROS:  Constitutional: No weight loss  Cardiac: No palpitations or racing  heart  Resp: No shortness of breath  Neuro: No numbness or tinging in feet  Endo: No heat or cold intolerance, no polyuria or polydipsia  All other systems were reviewed and were negative.    Past Medical History:  Patient Active Problem List    Diagnosis Date Noted    Menopause 11/06/2023    IUD (intrauterine device) in place 12/27/2021    Multiple thyroid nodules 07/20/2018    Fatty liver 06/22/2018    Chronic dryness of both eyes 12/27/2016    Vitamin D deficiency 09/22/2016    TED on CPAP 03/23/2016    Hyperactivity (behavior)     Type 2 diabetes mellitus, without long-term current use of insulin (HCC)     Essential hypertension     Dyslipidemia        Past Surgical History:  Past Surgical History:   Procedure Laterality Date    HYSTEROSCOPY NOVASURE-2  2010    Dr. Sandoval    ABDOMINOPLASTY  1999    Dr. Ricci    TONSILLECTOMY         Allergies:  Patient has no known allergies.    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[employer]     Employer: OTHER   Tobacco Use    Smoking status: Never    Smokeless tobacco: Never   Vaping Use    Vaping Use: Never used   Substance and Sexual Activity    Alcohol use: Yes     Alcohol/week: 4.2 - 8.4 oz     Types: 7 - 14 Glasses of wine per week     Comment: wine every other day    Drug use: No    Sexual activity: Yes     Partners: Male     Birth control/protection: I.U.D.     Comment: IUD Dec 2016   Other Topics Concern    Not on file   Social History Narrative    Not on file     Social Determinants of Health     Financial Resource Strain: Not on file   Food Insecurity: Not on file   Transportation Needs: Not on file   Physical Activity: Sufficiently Active (12/27/2021)    Exercise Vital Sign     Days of Exercise per Week: 3 days      Minutes of Exercise per Session: 50 min   Stress: No Stress Concern Present (12/27/2021)    [place] [employer] - Occupational Stress Questionnaire     Feeling of Stress : Only a little   Social Connections: Unknown (12/27/2021)    Social Connection and Isolation Panel [NHANES]     Frequency of Communication with Friends and Family: Not on file     Frequency of Social Gatherings with Friends and Family: Not on file     Attends Muslim Services: Not on file     Active Member of Clubs or Organizations: No     Attends Club or Organization Meetings: Not on file     Marital Status: Not on file   Intimate Partner Violence: Not on file   Housing Stability: Not on file       Family History:  Family History   Problem Relation Age of Onset    Hypertension Mother     Hypertension Father     Heart Attack Father     Psychiatric Illness Paternal Uncle     Heart Disease Maternal Grandmother     Hypertension Maternal Grandmother     Heart Disease Maternal Grandfather     Hypertension Maternal Grandfather     Diabetes Maternal Uncle     Sleep Apnea Neg Hx        Medications:    Current Outpatient Medications:     Dulaglutide (TRULICITY) 1.5 MG/0.5ML Solution Pen-injector, Inject 1 Pen under the skin every 7 days., Disp: 2.24 mL, Rfl: 0    lisinopril-hydrochlorothiazide (PRINZIDE) 20-25 MG per tablet, Take 1 Tablet by mouth every day., Disp: 90 Tablet, Rfl: 3    atorvastatin (LIPITOR) 10 MG Tab, Take 1 Tablet by mouth every day., Disp: 90 Tablet, Rfl: 3    Empagliflozin-metFORMIN HCl ER (SYNJARDY XR)  MG TABLET SR 24 HR, Take 1 Tablet by mouth every day., Disp: 90 Tablet, Rfl: 3    Dulaglutide (TRULICITY) 3 MG/0.5ML Solution Pen-injector, Inject 1 Pen under the skin every 7 days., Disp: 2 mL, Rfl: 6    Blood Glucose Monitoring Suppl Device, Meter: Dispense Device of Insurance Preference. Sig. Use as directed for blood sugar monitoring. #1. NR., Disp: 1 Each, Rfl: 0    Blood Glucose Test Strips, Test  strips order: Test strips for One Touch Ultra 2 meter. Sig: use twice daily and prn ssx high or low sugar #100 RF x 3, Disp: 100 Strip, Rfl: 3    tretinoin (RETIN-A) 0.05 % cream, , Disp: , Rfl: 2    Triamcinolone Acetonide (NASACORT AQ NA), Spray  in nose., Disp: , Rfl:     Cholecalciferol (VITAMIN D PO), Take 5,000 Caps by mouth., Disp: , Rfl:     Lancets Misc, 1 Each by Does not apply route 3 times a day., Disp: 100 Each, Rfl: 2    Labs: Reviewed    Physical Examination:  Vital signs: There were no vitals taken for this visit. There is no height or weight on file to calculate BMI.  General: No apparent distress, cooperative  Eyes: No scleral icterus or discharge  ENMT: Normal on external inspection of nose, lips, normal thyroid exam  Neck: No abnormal masses on inspection  Resp: Normal effort, clear to auscultation bilaterally   CVS: Regular rate and rhythm, S1 S2 normal, no murmur   Extremities: No edema  Abdomen: abdominal obesity present  Neuro: Alert and oriented  Skin: No rash  Psych: Normal mood and affect, intact memory and able to make informed decisions    Assessment and Plan:    Basic physiology of DMII was explained to patient as well as microvascular/macrovascular complications. The importance of increasing physical activity to improve diabetes control was discussed with the patient. Patient was also educated on changing diet and making better choices to help control blood sugar.    Patient is optimized on Synjardy.  Tolerating current dose of Trulicity.  Was forced to decrease back to 1.5mg dose d/t supply chain issues.  Home FBG continue to run slightly above goal, this is likely d/t high levels of stress at work.  A1c has improved to goal, now down to 6.9%.  Has been actively working on nutrition habits, decreasing wine intake, keeping carbs at a minimum, and allowing 2 1/2 to 3 hours between last meal and bedtime.  Plans to increase exercise as weather improves.    Patient prefers to continue with  current medications for now, which is a reasonable plan.  Stressed importance of continued intense TLC modifications.  Increase exercise with improving weather.     Follow Up:  Three months for A1c    Thank you for allowing me to participate in the care of this patient.    Greyson Arreaga, YueD, BCACP  03/05/24    CC:   CORRY Herr

## 2024-03-13 DIAGNOSIS — E11.9 TYPE 2 DIABETES MELLITUS WITHOUT COMPLICATION, WITHOUT LONG-TERM CURRENT USE OF INSULIN (HCC): ICD-10-CM

## 2024-03-14 ENCOUNTER — HOSPITAL ENCOUNTER (OUTPATIENT)
Facility: MEDICAL CENTER | Age: 54
End: 2024-03-14
Attending: NURSE PRACTITIONER
Payer: COMMERCIAL

## 2024-03-14 ENCOUNTER — OFFICE VISIT (OUTPATIENT)
Dept: MEDICAL GROUP | Facility: PHYSICIAN GROUP | Age: 54
End: 2024-03-14
Payer: COMMERCIAL

## 2024-03-14 VITALS
HEIGHT: 63 IN | HEART RATE: 78 BPM | OXYGEN SATURATION: 98 % | TEMPERATURE: 97.9 F | DIASTOLIC BLOOD PRESSURE: 70 MMHG | BODY MASS INDEX: 29.77 KG/M2 | WEIGHT: 168 LBS | SYSTOLIC BLOOD PRESSURE: 132 MMHG

## 2024-03-14 DIAGNOSIS — I10 ESSENTIAL HYPERTENSION: ICD-10-CM

## 2024-03-14 DIAGNOSIS — E11.9 TYPE 2 DIABETES MELLITUS WITHOUT COMPLICATION, WITHOUT LONG-TERM CURRENT USE OF INSULIN (HCC): ICD-10-CM

## 2024-03-14 DIAGNOSIS — E78.5 DYSLIPIDEMIA: ICD-10-CM

## 2024-03-14 LAB
CREAT UR-MCNC: 129.01 MG/DL
MICROALBUMIN UR-MCNC: 2.1 MG/DL
MICROALBUMIN/CREAT UR: 16 MG/G (ref 0–30)

## 2024-03-14 PROCEDURE — 3078F DIAST BP <80 MM HG: CPT | Performed by: NURSE PRACTITIONER

## 2024-03-14 PROCEDURE — 3075F SYST BP GE 130 - 139MM HG: CPT | Performed by: NURSE PRACTITIONER

## 2024-03-14 PROCEDURE — 82570 ASSAY OF URINE CREATININE: CPT

## 2024-03-14 PROCEDURE — 99214 OFFICE O/P EST MOD 30 MIN: CPT | Performed by: NURSE PRACTITIONER

## 2024-03-14 PROCEDURE — 82043 UR ALBUMIN QUANTITATIVE: CPT

## 2024-03-14 RX ORDER — ATORVASTATIN CALCIUM 10 MG/1
10 TABLET, FILM COATED ORAL
Qty: 90 TABLET | Refills: 3 | Status: SHIPPED | OUTPATIENT
Start: 2024-03-14

## 2024-03-14 RX ORDER — LISINOPRIL AND HYDROCHLOROTHIAZIDE 25; 20 MG/1; MG/1
1 TABLET ORAL
Qty: 90 TABLET | Refills: 3 | Status: SHIPPED | OUTPATIENT
Start: 2024-03-14

## 2024-03-14 RX ORDER — EMPAGLIFLOZIN, METFORMIN HYDROCHLORIDE 25; 1000 MG/1; MG/1
1 TABLET, EXTENDED RELEASE ORAL DAILY
Qty: 90 TABLET | Refills: 3 | Status: SHIPPED | OUTPATIENT
Start: 2024-03-14

## 2024-03-14 ASSESSMENT — PATIENT HEALTH QUESTIONNAIRE - PHQ9: CLINICAL INTERPRETATION OF PHQ2 SCORE: 0

## 2024-03-14 ASSESSMENT — FIBROSIS 4 INDEX: FIB4 SCORE: 0.57

## 2024-03-14 NOTE — LETTER
[facility]  CORRY Herr  [address] [place] [address]  Fax: [phone]   Authorization for Release/Disclosure of   Protected Health Information   Name: DORA TAMEZ : 1970 SSN: [id number]   Address: [address] [place] [address] [place] [address] Phone:    [phone] (home) [phone] (work)   I authorize the entity listed below to release/disclose the PHI below to:   [facility]/CORRY Herr and CORRY Herr   Provider or Entity Name:  [facility]   Address   City, State, [facility]   Phone:      Fax:     Reason for request: continuity of care   Information to be released:    [  ] LAST COLONOSCOPY,  including any PATH REPORT and follow-up  [  ] LAST FIT/COLOGUARD RESULT [  ] LAST DEXA  [xxx ] LAST MAMMOGRAM  [  ] LAST PAP  [  ] LAST LABS [  ] RETINA EXAM REPORT  [  ] IMMUNIZATION RECORDS  [  ] Release all info      [  ] Check here and initial the line next to each item to release ALL health information INCLUDING  _____ Care and treatment for drug and / or alcohol abuse  _____ HIV testing, infection status, or AIDS  _____ Genetic Testing    DATES OF SERVICE OR TIME PERIOD TO BE DISCLOSED: _____________  I understand and acknowledge that:  * This Authorization may be revoked at any time by you in writing, except if your health information has already been used or disclosed.  * Your health information that will be used or disclosed as a result of you signing this authorization could be re-disclosed by the recipient. If this occurs, your re-disclosed health information may no longer be protected by State or Federal laws.  * You may refuse to sign this Authorization. Your refusal will not affect your ability to obtain treatment.  * This Authorization becomes effective upon signing and will  on (date) __________.      If no date is indicated, this Authorization will  one (1) year from the signature date.    Name: Dora Ulrich  Pito  Signature: Date:   3/14/2024     PLEASE FAX REQUESTED RECORDS BACK TO: [phone]

## 2024-03-14 NOTE — PROGRESS NOTES
Subjective:     CC: medication refill, medication problem    HPI:   Dora presents today with the following:    Type 2 diabetes mellitus without complication, without long-term current use of insulin ([facility])  March A1c 6.9%.  She has been following up with pharmacotherapy.  Continues Synjardy XR  mg daily.  She has a prescription for Trulicity 1.5 mg weekly but has not been able to do fill her prescription.  Her last dose was last week. With higher dose of 3 mg Trulicity has been difficult to get her supply.  She is taking for 1.5 mg dosing once supplies available. She has been working on lifestyle changes. Her alcohol use has decreased. She is remodeling house.  She has been having stress at work.  She had her retinal screening in December and states she did not have any retinopathy.    Dyslipidemia  Recent labs show controlled cholesterol.  Continues atorvastatin 10 mg daily.  She is been working on lifestyle changes.    Essential hypertension  Blood pressure today 132/70.  Continues lisinopril-hydrochlorothiazide 20-25 mg daily.  Recent labs show CMP WNL.      Past Medical History:   Diagnosis Date    Anxiety     Diabetes mellitus, type 2 (HCC)     Dyslipidemia     HTN (hypertension)     Hypertension     Obesity 5/22/2019    Sleep apnea        Social History     Tobacco Use    Smoking status: Never    Smokeless tobacco: Never   Vaping Use    Vaping Use: Never used   Substance Use Topics    Alcohol use: Yes     Alcohol/week: 4.2 - 8.4 oz     Types: 7 - 14 Glasses of wine per week     Comment: wine 3 days a week    Drug use: No       Current Outpatient Medications Ordered in Epic   Medication Sig Dispense Refill    lisinopril-hydrochlorothiazide (PRINZIDE) 20-25 MG per tablet Take 1 Tablet by mouth every day. 90 Tablet 3    atorvastatin (LIPITOR) 10 MG Tab Take 1 Tablet by mouth every day. 90 Tablet 3    Empagliflozin-metFORMIN HCl ER (SYNJARDY XR)  MG TABLET SR 24 HR Take 1 Tablet by mouth every day.  "90 Tablet 3    Blood Glucose Monitoring Suppl Device Meter: Dispense Device of Insurance Preference. Sig. Use as directed for blood sugar monitoring. #1. NR. 1 Each 0    Blood Glucose Test Strips Test strips order: Test strips for One Touch Ultra 2 meter. Sig: use twice daily and prn ssx high or low sugar #100 RF x 3 100 Strip 3    tretinoin (RETIN-A) 0.05 % cream   2    Triamcinolone Acetonide (NASACORT AQ NA) Spray  in nose.      Cholecalciferol (VITAMIN D PO) Take 5,000 Caps by mouth.      Lancets Misc 1 Each by Does not apply route 3 times a day. 100 Each 2    Dulaglutide (TRULICITY) 1.5 MG/0.5ML Solution Pen-injector Inject 1 Pen under the skin every 7 days. (Patient not taking: Reported on 3/14/2024) 6.72 mL 3     No current Epic-ordered facility-administered medications on file.       Allergies:  Patient has no known allergies.    Health Maintenance: Reviewed. Due for urine microalbumin. December had retinal screening, requesting records.  Requesting mammogram records today.      Objective:     Vital signs reviewed  Exam:  /70 (BP Location: Left arm, Patient Position: Sitting, BP Cuff Size: Adult)   Pulse 78   Temp 36.6 °C (97.9 °F) (Temporal)   Ht 1.6 m (5' 3\")   Wt 76.2 kg (168 lb)   SpO2 98%   BMI 29.76 kg/m²  Body mass index is 29.76 kg/m².    Gen: Alert and oriented, No apparent distress.  Lungs: Normal effort, CTA bilaterally, no wheezes, rhonchi, or rales  CV: Regular rate and rhythm. No murmurs, rubs, or gallops.  Ext: No clubbing, cyanosis, edema.      Assessment & Plan:     53 y.o. female with the following -     1. Type 2 diabetes mellitus without complication, without long-term current use of insulin (HCC)  Chronic stable problem.  A1c is improved.  Continue with decrease Trulicity dose of 1.5 mg weekly as available with supply from pharmacy.  Will reach out to pharmacotherapy as well.  Continue Synjardy ER  mg daily.  Continue lifestyle changes.  Requesting retinal screening " records.  Urine microalbumin completed today.  - MICROALBUMIN CREAT RATIO URINE; Future  - Empagliflozin-metFORMIN HCl ER (SYNJARDY XR)  MG TABLET SR 24 HR; Take 1 Tablet by mouth every day.  Dispense: 90 Tablet; Refill: 3    2. Dyslipidemia  Chronic stable problem.  Continue with diet and lifestyle modifications.  Continue atorvastatin 10 mg daily.  - atorvastatin (LIPITOR) 10 MG Tab; Take 1 Tablet by mouth every day.  Dispense: 90 Tablet; Refill: 3    3. Essential hypertension  Chronic stable problem.  Blood pressure is controlled today.  Recent CMP WNL.  Continue lisinopril-hydrochlorothiazide 20-25 mg daily.  - lisinopril-hydrochlorothiazide (PRINZIDE) 20-25 MG per tablet; Take 1 Tablet by mouth every day.  Dispense: 90 Tablet; Refill: 3      Return in about 6 months (around 9/14/2024) for annual.    Please note that this dictation was created using voice recognition software. I have made every reasonable attempt to correct obvious errors, but I expect that there are errors of grammar and possibly content that I did not discover before finalizing the note.

## 2024-03-14 NOTE — ASSESSMENT & PLAN NOTE
Blood pressure today 132/70.  Continues lisinopril-hydrochlorothiazide 20-25 mg daily.  Recent labs show CMP WNL.

## 2024-03-14 NOTE — ASSESSMENT & PLAN NOTE
March A1c 6.9%.  She has been following up with pharmacotherapy.  Continues Synjardy XR  mg daily.  She has a prescription for Trulicity 1.5 mg weekly but has not been able to do fill her prescription.  Her last dose was last week. With higher dose of 3 mg Trulicity has been difficult to get her supply.  She is taking for 1.5 mg dosing once supplies available. She has been working on lifestyle changes. Her alcohol use has decreased. She is remodeling house.  She has been having stress at work.  She had her retinal screening in December and states she did not have any retinopathy.

## 2024-03-14 NOTE — ASSESSMENT & PLAN NOTE
Recent labs show controlled cholesterol.  Continues atorvastatin 10 mg daily.  She is been working on lifestyle changes.

## 2024-03-14 NOTE — LETTER
[facility]  CORRY Herr  [address] [place] [address]  Fax: [phone]   Authorization for Release/Disclosure of   Protected Health Information   Name: DORA SHELDON : 1970 SSN: [id number]   Address: [facility] [place] [address] Phone:    [phone] (home) [phone] (work)   I authorize the entity listed below to release/disclose the PHI below to:   [facility]/CORRY Herr and CORRY Herr   Provider or Entity Name:  [facility]    Address   City, State, Zip   Phone:      Fax:     Reason for request: continuity of care   Information to be released:    [  ] LAST COLONOSCOPY,  including any PATH REPORT and follow-up  [  ] LAST FIT/COLOGUARD RESULT [  ] LAST DEXA  [  ] LAST MAMMOGRAM  [  ] LAST PAP  [  ] LAST LABS [xxx] RETINA EXAM REPORT  [  ] IMMUNIZATION RECORDS  [  ] Release all info      [  ] Check here and initial the line next to each item to release ALL health information INCLUDING  _____ Care and treatment for drug and / or alcohol abuse  _____ HIV testing, infection status, or AIDS  _____ Genetic Testing    DATES OF SERVICE OR TIME PERIOD TO BE DISCLOSED: _____________  I understand and acknowledge that:  * This Authorization may be revoked at any time by you in writing, except if your health information has already been used or disclosed.  * Your health information that will be used or disclosed as a result of you signing this authorization could be re-disclosed by the recipient. If this occurs, your re-disclosed health information may no longer be protected by State or Federal laws.  * You may refuse to sign this Authorization. Your refusal will not affect your ability to obtain treatment.  * This Authorization becomes effective upon signing and will  on (date) __________.      If no date is indicated, this Authorization will  one (1) year from the signature date.    Name: Dora Sheldon  Signature: Date:    3/14/2024     PLEASE FAX REQUESTED RECORDS BACK TO: [phone]

## 2024-03-19 RX ORDER — SEMAGLUTIDE 0.68 MG/ML
0.25 INJECTION, SOLUTION SUBCUTANEOUS
Qty: 3 ML | Refills: 11 | Status: SHIPPED | OUTPATIENT
Start: 2024-03-19

## 2024-03-19 RX ORDER — DULAGLUTIDE 1.5 MG/.5ML
1 INJECTION, SOLUTION SUBCUTANEOUS
Refills: 3 | OUTPATIENT
Start: 2024-03-19

## 2024-03-20 NOTE — TELEPHONE ENCOUNTER
Received notification that Trulicity unavailable at current pharmacy.  S/w patient and transitioned to Ozempic for now.  She is comfortable with dosing.  Will f/u in a couple weeks to determine tolerability.  Greyson Arreaga, PharmD, BCACP

## 2024-04-24 ENCOUNTER — TELEPHONE (OUTPATIENT)
Dept: MEDICAL GROUP | Facility: PHYSICIAN GROUP | Age: 54
End: 2024-04-24
Payer: COMMERCIAL

## 2024-04-24 RX ORDER — SEMAGLUTIDE 1.34 MG/ML
0.5 INJECTION, SOLUTION SUBCUTANEOUS
Qty: 3 ML | Refills: 10 | Status: SHIPPED | OUTPATIENT
Start: 2024-04-24

## 2024-04-24 NOTE — PROGRESS NOTES
PA for Ozempic submitted via CoverMyMeds.  Key:  [id number]  Will await determination.  Greyson Arreaga, PharmD, BCACP

## 2024-04-24 NOTE — TELEPHONE ENCOUNTER
FINAL PRIOR AUTHORIZATION STATUS:    1.  Name of Medication & Dose: Ozempic     2. Prior Auth Status: Approved through 4/24/25     3. Action Taken: Pharmacy Notified: yes and N\A Patient Notified: N\A

## 2024-06-04 ENCOUNTER — OFFICE VISIT (OUTPATIENT)
Dept: MEDICAL GROUP | Facility: PHYSICIAN GROUP | Age: 54
End: 2024-06-04
Payer: COMMERCIAL

## 2024-06-04 DIAGNOSIS — E11.9 TYPE 2 DIABETES MELLITUS WITHOUT COMPLICATION, WITHOUT LONG-TERM CURRENT USE OF INSULIN (HCC): ICD-10-CM

## 2024-06-04 LAB
HBA1C MFR BLD: 7.2 % (ref ?–5.8)
POCT INT CON NEG: NEGATIVE
POCT INT CON POS: POSITIVE

## 2024-06-04 PROCEDURE — 83036 HEMOGLOBIN GLYCOSYLATED A1C: CPT | Performed by: PHARMACIST

## 2024-06-04 PROCEDURE — 99401 PREV MED CNSL INDIV APPRX 15: CPT | Performed by: NURSE PRACTITIONER

## 2024-06-04 NOTE — PROGRESS NOTES
Patient Consult Note - Follow Up Visit  Primary care physician: CORRY Herr    Reason for consult: Management of Uncontrolled Type 2 Diabetes    Time IN:  7:31am  Time OUT:  7:49am    HPI:  Dora Sheldon is a 53 y.o. old patient who comes in today for evaluation of above stated problem.    Most Recent HbA1c:   Lab Results   Component Value Date/Time    HBA1C 7.2 (A) 06/04/2024 07:35 AM      Reliable and Exact Care Diabetes Score    Displays the Diabetes REC Score.  A patient gets 1 point for each measure for a maximum of 7 points   0  Measures Not Met            Current Diabetes Regimen:  GLP-1 Agent: Ozempic 0.5mg SQ once weekly - at this dose for ~six weeks  Metformin + SGLT-2 Inhibitor: Synjardy XR 25-1000mg QD     Previously attempted medications  Glipizide - unknown reason for d/c  Trulicity - backorder, changed to Ozempic    Discussed FBG goal of , 2hrPP <180, and A1c <7.0%.  Before Breakfast: no current results.  Before Lunch:  Before Dinner:  Before Bedtime:  Other times:  Hypoglycemia:  None    Diet/Exercise:  Patient states that she does not have same appetite suppressant impact from Ozempic that she had with Trulicity.  Still doing best to keep portions in check.  Overall nutrition habits remain about the same from last visit.  Still has good deal of life stresses with home projects and work issues.  States that exercise has been down some, plans to get back to more regular walking.    Preventative Management  BP regimen (ACEi/ARB): Lisinopril-HCTZ 20-25mg QD  BP <140/90: Yes  Statin: atorvastatin (Lipitor) 10 mg daily  LDL <100: Yes  Lab Results   Component Value Date/Time    CHOLSTRLTOT 118 03/04/2024 07:04 AM    LDL 58 03/04/2024 07:04 AM    HDL 43 03/04/2024 07:04 AM    TRIGLYCERIDE 87 03/04/2024 07:04 AM       Last Microalbumin/Cr:  Lab Results   Component Value Date/Time    MALBCRT 16 03/14/2024 10:22 AM    MICROALBUR 2.1 03/14/2024 10:22 AM     Last A1c:  Lab Results    Component Value Date/Time    HBA1C 7.2 (A) 06/04/2024 07:35 AM      Last Retinal Scan: 12/2022     Monofilament exam: up to date, 5/2023, not conducted today     Updated caregaps    ROS:  Constitutional: No weight loss  Cardiac: No palpitations or racing heart  Resp: No shortness of breath  Neuro: No numbness or tinging in feet  Endo: No heat or cold intolerance, no polyuria or polydipsia  All other systems were reviewed and were negative.    Past Medical History:  Patient Active Problem List    Diagnosis Date Noted    Menopause 11/06/2023    IUD (intrauterine device) in place 12/27/2021    Multiple thyroid nodules 07/20/2018    Fatty liver 06/22/2018    Chronic dryness of both eyes 12/27/2016    Vitamin D deficiency 09/22/2016    TED on CPAP 03/23/2016    Hyperactivity (behavior)     Type 2 diabetes mellitus without complication, without long-term current use of insulin (HCC)     Essential hypertension     Dyslipidemia        Past Surgical History:  Past Surgical History:   Procedure Laterality Date    HYSTEROSCOPY NOVASURE-2  2010    Dr. Sandoval    ABDOMINOPLASTY  1999    Dr. Ricci    TONSILLECTOMY         Allergies:  Patient has no known allergies.    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[employer]     Employer: OTHER   Tobacco Use    Smoking status: Never    Smokeless tobacco: Never   Vaping Use    Vaping status: Never Used   Substance and Sexual Activity    Alcohol use: Yes     Alcohol/week: 4.2 - 8.4 oz     Types: 7 - 14 Glasses of wine per week     Comment: wine 3 days a week    Drug use: No    Sexual activity: Yes     Partners: Male     Birth control/protection: I.U.D.     Comment: IUD Dec 2016   Other Topics Concern    Not on file   Social History  Narrative    Not on file     Social Determinants of Health     Financial Resource Strain: Not on file   Food Insecurity: Not on file   Transportation Needs: Not on file   Physical Activity: Sufficiently Active (12/27/2021)    Exercise Vital Sign     Days of Exercise per Week: 3 days     Minutes of Exercise per Session: 50 min   Stress: No Stress Concern Present (12/27/2021)    [place] [place] [employer] - Occupational Stress Questionnaire     Feeling of Stress : Only a little   Social Connections: Unknown (12/27/2021)    Social Connection and Isolation Panel [NHANES]     Frequency of Communication with Friends and Family: Not on file     Frequency of Social Gatherings with Friends and Family: Not on file     Attends Tenriism Services: Not on file     Active Member of Clubs or Organizations: No     Attends Club or Organization Meetings: Not on file     Marital Status: Not on file   Intimate Partner Violence: Not on file   Housing Stability: Not on file       Family History:  Family History   Problem Relation Age of Onset    Hypertension Mother     Hypertension Father     Heart Attack Father     Psychiatric Illness Paternal Uncle     Heart Disease Maternal Grandmother     Hypertension Maternal Grandmother     Heart Disease Maternal Grandfather     Hypertension Maternal Grandfather     Diabetes Maternal Uncle     Sleep Apnea Neg Hx        Medications:    Current Outpatient Medications:     Semaglutide,0.25 or 0.5MG/DOS, (OZEMPIC, 0.25 OR 0.5 MG/DOSE,) 2 MG/1.5ML Solution Pen-injector, Inject 0.5 mg under the skin every 7 days., Disp: 3 mL, Rfl: 10    lisinopril-hydrochlorothiazide (PRINZIDE) 20-25 MG per tablet, Take 1 Tablet by mouth every day., Disp: 90 Tablet, Rfl: 3    Empagliflozin-metFORMIN HCl ER (SYNJARDY XR)  MG TABLET SR 24 HR, Take 1 Tablet by mouth every day., Disp: 90 Tablet, Rfl: 3    atorvastatin (LIPITOR) 10 MG Tab, Take 1 Tablet by mouth every day., Disp: 90 Tablet, Rfl: 3     "Blood Glucose Monitoring Suppl Device, Meter: Dispense Device of Insurance Preference. Sig. Use as directed for blood sugar monitoring. #1. NR., Disp: 1 Each, Rfl: 0    Blood Glucose Test Strips, Test strips order: Test strips for One Touch Ultra 2 meter. Sig: use twice daily and prn ssx high or low sugar #100 RF x 3, Disp: 100 Strip, Rfl: 3    tretinoin (RETIN-A) 0.05 % cream, , Disp: , Rfl: 2    Triamcinolone Acetonide (NASACORT AQ NA), Spray  in nose., Disp: , Rfl:     Cholecalciferol (VITAMIN D PO), Take 5,000 Caps by mouth., Disp: , Rfl:     Lancets Misc, 1 Each by Does not apply route 3 times a day., Disp: 100 Each, Rfl: 2    Labs: Reviewed    Physical Examination:  Vital signs: There were no vitals taken for this visit. There is no height or weight on file to calculate BMI.  General: No apparent distress, cooperative  Eyes: No scleral icterus or discharge  ENMT: Normal on external inspection of nose, lips, normal thyroid exam  Neck: No abnormal masses on inspection  Resp: Normal effort, clear to auscultation bilaterally   CVS: Regular rate and rhythm, S1 S2 normal, no murmur   Extremities: No edema  Abdomen: abdominal obesity present  Neuro: Alert and oriented  Skin: No rash  Psych: Normal mood and affect, intact memory and able to make informed decisions    Assessment and Plan:    Basic physiology of DMII was explained to patient as well as microvascular/macrovascular complications. The importance of increasing physical activity to improve diabetes control was discussed with the patient. Patient was also educated on changing diet and making better choices to help control blood sugar.    Patient is optimized on Synjardy.  Tolerating transition to Ozempic.  No definitive home FBG to assess today.  A1c has increased, now slightly above goal at 7.2%, but to be expected with transition from Trulicity to Ozempic.  As above, has not had as much appetite suppression and \"feeling full\" with Ozempic, may be d/t lower " dosing.  Nutrition habits largely unchanged otherwise.  Less exercise recently, plans to pick things back up.    Will have patient finish out current Ozempic 0.5mg supply (~six weeks at home), then plan to titrate to 1mg dose.  Continue all other medications for now.  Stressed importance of keeping optimal nutrition habits, even in face of high stress levels.  Increase exercise.    Follow Up:  Three months for A1c per patient request, one month by Mychart to titrate Ozempic.    Thank you for allowing me to participate in the care of this patient.    Greyson Arreaga, PharmD, BCACP  06/04/24    CC:   CORRY Herr

## 2024-06-25 ENCOUNTER — PATIENT MESSAGE (OUTPATIENT)
Dept: MEDICAL GROUP | Facility: PHYSICIAN GROUP | Age: 54
End: 2024-06-25
Payer: COMMERCIAL

## 2024-06-25 DIAGNOSIS — E11.9 TYPE 2 DIABETES MELLITUS WITHOUT COMPLICATION, WITHOUT LONG-TERM CURRENT USE OF INSULIN (HCC): ICD-10-CM

## 2024-06-26 RX ORDER — SEMAGLUTIDE 1.34 MG/ML
1 INJECTION, SOLUTION SUBCUTANEOUS
Qty: 3 ML | Refills: 5 | Status: SHIPPED | OUTPATIENT
Start: 2024-06-26

## 2024-06-26 NOTE — PROGRESS NOTES
Received message from pt that she is ready to increase Ozempic.  Sent order for Ozempic 1mg q 7 days to [facility].  Carol Llamas, PharmD

## 2024-09-03 ENCOUNTER — OFFICE VISIT (OUTPATIENT)
Dept: MEDICAL GROUP | Facility: PHYSICIAN GROUP | Age: 54
End: 2024-09-03
Payer: COMMERCIAL

## 2024-09-03 DIAGNOSIS — E11.9 TYPE 2 DIABETES MELLITUS WITHOUT COMPLICATION, WITHOUT LONG-TERM CURRENT USE OF INSULIN (HCC): ICD-10-CM

## 2024-09-03 LAB
HBA1C MFR BLD: 7.1 % (ref ?–5.8)
POCT INT CON NEG: NEGATIVE
POCT INT CON POS: POSITIVE

## 2024-09-03 PROCEDURE — 99401 PREV MED CNSL INDIV APPRX 15: CPT | Performed by: NURSE PRACTITIONER

## 2024-09-03 PROCEDURE — 83036 HEMOGLOBIN GLYCOSYLATED A1C: CPT | Performed by: NURSE PRACTITIONER

## 2024-09-03 NOTE — PROGRESS NOTES
Patient Consult Note - Follow Up Visit  Primary care physician: CORRY Herr    Reason for consult: Management of Uncontrolled Type 2 Diabetes    Time IN:  7:28am  Time OUT:  7:48am    HPI:  Dora Sheldon is a 54 y.o. old patient who comes in today for evaluation of above stated problem.    Most Recent HbA1c:   Lab Results   Component Value Date/Time    HBA1C 7.1 (A) 09/03/2024 07:38 AM      Reliable and Exact Care Diabetes Score    Displays the Diabetes REC Score.  A patient gets 1 point for each measure for a maximum of 7 points   0  Measures Not Met            Current Diabetes Regimen:  GLP-1 Agent: Ozempic 1mg SQ once weekly  Metformin + SGLT-2 Inhibitor: Synjardy XR 25-1000mg QD     Previously attempted medications  Glipizide - unknown reason for d/c  Trulicity - backorder, changed to Ozempic    Discussed FBG goal of , 2hrPP <180, and A1c <7.0%.  Before Breakfast: 140s on average (lowest 128ish)  Before Lunch:  Before Dinner:  Before Bedtime:  Other times:  Hypoglycemia:  None    Diet/Exercise:  Has increased her exercise over the last several weeks, getting back to regular walking.  She is hope to get back to previous levels of walking on daily basis and further on the weekends.  Nutrition habits on par with previous visit.  Eating more fruits in the form of berries and watermelon recently.  She does feel that her total caloric intake is down and feeling more satiated on this dose of Ozempic.    Preventative Management  BP regimen (ACEi/ARB): Lisinopril-HCTZ 20-25mg QD  BP <140/90: Yes  Statin: atorvastatin (Lipitor) 10 mg daily  LDL <100: Yes  Lab Results   Component Value Date/Time    CHOLSTRLTOT 118 03/04/2024 07:04 AM    LDL 58 03/04/2024 07:04 AM    HDL 43 03/04/2024 07:04 AM    TRIGLYCERIDE 87 03/04/2024 07:04 AM       Last Microalbumin/Cr:  Lab Results   Component Value Date/Time    MALBCRT 16 03/14/2024 10:22 AM    MICROALBUR 2.1 03/14/2024 10:22 AM     Last A1c:  Lab Results    Component Value Date/Time    HBA1C 7.1 (A) 09/03/2024 07:38 AM      Last Retinal Scan: needs updating, last 12/2022    Monofilament exam: needs updating, patient asked to defer to next appt.       Updated caregaps      ROS:  Constitutional: No weight loss  Cardiac: No palpitations or racing heart  Resp: No shortness of breath  Neuro: No numbness or tinging in feet  Endo: No heat or cold intolerance, no polyuria or polydipsia  All other systems were reviewed and were negative.    Past Medical History:  Patient Active Problem List    Diagnosis Date Noted    Menopause 11/06/2023    IUD (intrauterine device) in place 12/27/2021    Multiple thyroid nodules 07/20/2018    Fatty liver 06/22/2018    Chronic dryness of both eyes 12/27/2016    Vitamin D deficiency 09/22/2016    TED on CPAP 03/23/2016    Hyperactivity (behavior)     Type 2 diabetes mellitus without complication, without long-term current use of insulin (HCC)     Essential hypertension     Dyslipidemia        Past Surgical History:  Past Surgical History:   Procedure Laterality Date    HYSTEROSCOPY NOVASURE-2  2010    Dr. Sandoval    ABDOMINOPLASTY  1999    Dr. Ricci    TONSILLECTOMY         Allergies:  Patient has no known allergies.    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[employer]     Employer: OTHER   Tobacco Use    Smoking status: Never    Smokeless tobacco: Never   Vaping Use    Vaping status: Never Used   Substance and Sexual Activity    Alcohol use: Yes     Alcohol/week: 4.2 - 8.4 oz     Types: 7 - 14 Glasses of wine per week     Comment: wine 3 days a week    Drug use: No    Sexual activity: Yes     Partners: Male     Birth control/protection: I.U.D.     Comment: IUD Dec 2016   Other Topics Concern     Not on file   Social History Narrative    Not on file     Social Determinants of Health     Financial Resource Strain: Not on file   Food Insecurity: Not on file   Transportation Needs: Not on file   Physical Activity: Sufficiently Active (12/27/2021)    Exercise Vital Sign     Days of Exercise per Week: 3 days     Minutes of Exercise per Session: 50 min   Stress: No Stress Concern Present (12/27/2021)    [place] [employer]ational [employer] - Occupational Stress Questionnaire     Feeling of Stress : Only a little   Social Connections: Unknown (12/27/2021)    Social Connection and Isolation Panel [NHANES]     Frequency of Communication with Friends and Family: Not on file     Frequency of Social Gatherings with Friends and Family: Not on file     Attends Restorationist Services: Not on file     Active Member of Clubs or Organizations: No     Attends Club or Organization Meetings: Not on file     Marital Status: Not on file   Intimate Partner Violence: Not on file   Housing Stability: Not on file       Family History:  Family History   Problem Relation Age of Onset    Hypertension Mother     Hypertension Father     Heart Attack Father     Psychiatric Illness Paternal Uncle     Heart Disease Maternal Grandmother     Hypertension Maternal Grandmother     Heart Disease Maternal Grandfather     Hypertension Maternal Grandfather     Diabetes Maternal Uncle     Sleep Apnea Neg Hx        Medications:    Current Outpatient Medications:     Semaglutide, 1 MG/DOSE, (OZEMPIC, 1 MG/DOSE,) 4 MG/3ML Solution Pen-injector, Inject 1 mg under the skin every 7 days., Disp: 3 mL, Rfl: 5    lisinopril-hydrochlorothiazide (PRINZIDE) 20-25 MG per tablet, Take 1 Tablet by mouth every day., Disp: 90 Tablet, Rfl: 3    Empagliflozin-metFORMIN HCl ER (SYNJARDY XR)  MG TABLET SR 24 HR, Take 1 Tablet by mouth every day., Disp: 90 Tablet, Rfl: 3    atorvastatin (LIPITOR) 10 MG Tab, Take 1 Tablet by mouth every day., Disp: 90 Tablet, Rfl:  3    Blood Glucose Monitoring Suppl Device, Meter: Dispense Device of Insurance Preference. Sig. Use as directed for blood sugar monitoring. #1. NR., Disp: 1 Each, Rfl: 0    Blood Glucose Test Strips, Test strips order: Test strips for One Touch Ultra 2 meter. Sig: use twice daily and prn ssx high or low sugar #100 RF x 3, Disp: 100 Strip, Rfl: 3    tretinoin (RETIN-A) 0.05 % cream, , Disp: , Rfl: 2    Triamcinolone Acetonide (NASACORT AQ NA), Spray  in nose., Disp: , Rfl:     Cholecalciferol (VITAMIN D PO), Take 5,000 Caps by mouth., Disp: , Rfl:     Lancets Misc, 1 Each by Does not apply route 3 times a day., Disp: 100 Each, Rfl: 2    Labs: Reviewed    Physical Examination:  Vital signs: There were no vitals taken for this visit. There is no height or weight on file to calculate BMI.  General: No apparent distress, cooperative  Eyes: No scleral icterus or discharge  ENMT: Normal on external inspection of nose, lips, normal thyroid exam  Neck: No abnormal masses on inspection  Resp: Normal effort, clear to auscultation bilaterally   CVS: Regular rate and rhythm, S1 S2 normal, no murmur   Extremities: No edema  Abdomen: abdominal obesity present  Neuro: Alert and oriented  Skin: No rash  Psych: Normal mood and affect, intact memory and able to make informed decisions    Assessment and Plan:    Basic physiology of DMII was explained to patient as well as microvascular/macrovascular complications. The importance of increasing physical activity to improve diabetes control was discussed with the patient. Patient was also educated on changing diet and making better choices to help control blood sugar.    Patient is optimized on Synjardy.  Tolerating current dose of Ozempic.  Home FBG are above goal, but she is only testing sporadically.  A1c unchanged from previous visit, still above goal at 7.1% today.    As above, has increased exercise, but only fairly recently.  She hopes to get back to daily walking with higher  volume on the weekend.  Nutrition habits remain largely unchanged, though eating more fruits in the last month or so.  Discussed fruits with lower glycemic index and advised to keep volume low.    Discussed dose escalation of Ozempic, patient is comfortable with current dosing and would like to continue as is.    Will continue all current medications for now.  Stressed importance of optimal nutrition habits, decrease fruit intake, focus on those with low glycemic intake.  Try to keep work stress levels low.  Continue to increase exercise, would highly recommend at least 150-180 min/week.    Follow Up:  Three months for A1c    Thank you for allowing me to participate in the care of this patient.    Greyson Arreaga, PharmD, [facility]  09/03/24    CC:   CORRY Herr

## 2024-09-19 ENCOUNTER — OFFICE VISIT (OUTPATIENT)
Dept: MEDICAL GROUP | Facility: PHYSICIAN GROUP | Age: 54
End: 2024-09-19
Payer: COMMERCIAL

## 2024-09-19 VITALS
DIASTOLIC BLOOD PRESSURE: 80 MMHG | OXYGEN SATURATION: 99 % | SYSTOLIC BLOOD PRESSURE: 108 MMHG | BODY MASS INDEX: 29.06 KG/M2 | TEMPERATURE: 97.5 F | WEIGHT: 164 LBS | HEART RATE: 74 BPM | HEIGHT: 63 IN

## 2024-09-19 DIAGNOSIS — E78.5 DYSLIPIDEMIA: ICD-10-CM

## 2024-09-19 DIAGNOSIS — G47.33 OSA ON CPAP: Chronic | ICD-10-CM

## 2024-09-19 DIAGNOSIS — E11.9 TYPE 2 DIABETES MELLITUS WITHOUT COMPLICATION, WITHOUT LONG-TERM CURRENT USE OF INSULIN (HCC): ICD-10-CM

## 2024-09-19 DIAGNOSIS — Z00.00 ANNUAL VISIT FOR GENERAL ADULT MEDICAL EXAMINATION WITHOUT ABNORMAL FINDINGS: ICD-10-CM

## 2024-09-19 DIAGNOSIS — E55.9 VITAMIN D DEFICIENCY: ICD-10-CM

## 2024-09-19 DIAGNOSIS — E04.2 MULTIPLE THYROID NODULES: ICD-10-CM

## 2024-09-19 DIAGNOSIS — I10 ESSENTIAL HYPERTENSION: ICD-10-CM

## 2024-09-19 PROBLEM — Z97.5 IUD (INTRAUTERINE DEVICE) IN PLACE: Status: RESOLVED | Noted: 2021-12-27 | Resolved: 2024-09-19

## 2024-09-19 PROCEDURE — 99396 PREV VISIT EST AGE 40-64: CPT | Performed by: NURSE PRACTITIONER

## 2024-09-19 PROCEDURE — 3074F SYST BP LT 130 MM HG: CPT | Performed by: NURSE PRACTITIONER

## 2024-09-19 PROCEDURE — 3079F DIAST BP 80-89 MM HG: CPT | Performed by: NURSE PRACTITIONER

## 2024-09-19 ASSESSMENT — FIBROSIS 4 INDEX: FIB4 SCORE: 0.58

## 2024-09-19 NOTE — PROGRESS NOTES
Subjective:     CC:   Chief Complaint   Patient presents with    Annual Exam       HPI:   Dora Sheldon is a 54 y.o. female who presents for annual exam. She is feeling well and denies any complaints.      Type 2 diabetes mellitus without complication, without long-term current use of insulin (HCC)  Recent A1C 7.1%. Continues Ozempic 1 mg weekly and Synjardy XR  mg daily.  She notes that the increase in Ozempic has been upsetting her stomach.  She is currently following with pharmacotherapy.    Essential hypertension  Blood pressure today 108/80.  Continues lisinopril-hydrochlorothiazide 20-25 mg daily.  She will check her blood pressure occasionally at the store and reports that her readings are <140/90.    Dyslipidemia  Continue atorvastatin 10 mg daily.  She is eating healthy diet and exercising.    TED on CPAP  Followed by sleep medicine. Next appointment 2024. Continues nightly CPAP.    Multiple thyroid nodules  She is followed with Dr. Najera, ENT.  Currently watchful waiting.    Vitamin D deficiency  Continues vitamin D3 5000 units daily.    Ob-Gyn/ History:    Patient has GYN provider: [facility]. Sand[facility]   /Para:  2/2  Last Pap Smear:  2023. No history of abnormal pap smears.  Gyn Surgery:  hysteroscopy.  Current Contraceptive Method:  ablation 15 years ago. She is currently sexually active.  Last menstrual period:  hx of ablation, none in 15 years.   No significant bloating/fluid retention, pelvic pain, or dyspareunia. No vaginal discharge.  Post-menopausal bleeding: no  Urinary incontinence: no    Health Maintenance  Cholesterol Screening: 3/4/2024   Diabetes Screenin/3/2024   Diet: She is watching her diet. Encouraged healthy diet.   Exercise: She is walking.   Substance Abuse: Discussed and reviewed. She has cut back on  her wine down.   Safe in relationship.   Seat belts safety discussed.  Sun protection used.    Cancer screening  Colorectal Cancer  Screening: 10/20/2021 with 5 year recall    Lung Cancer Screening: non-smoker    Cervical Cancer Screenin, requesting records   Breast Cancer Screening: November mammogram scheduled, last 2023    Infectious disease screening/Immunizations  --Practices safe sex.  --HIV Screening: declines   --Hepatitis C Screening: 3/8/2023   --Immunizations:    Influenza: refused   Tetanus: 2022    Shingles: encouraged vaccine   Pneumococcal : declines today     Other immunizations: declines COVID     She  has a past medical history of Anxiety, Diabetes mellitus, type 2 (HCC), Dyslipidemia, HTN (hypertension), Hypertension, Obesity (2019), and Sleep apnea.  She  has a past surgical history that includes hysteroscopy novasure-2 (); abdominoplasty (); and tonsillectomy.    Family History   Problem Relation Age of Onset    Hypertension Mother     Hypertension Father     Heart Attack Father     Psychiatric Illness Paternal Uncle     Heart Disease Maternal Grandmother     Hypertension Maternal Grandmother     Heart Disease Maternal Grandfather     Hypertension Maternal Grandfather     Diabetes Maternal Uncle     Sleep Apnea Neg Hx        Social History     Socioeconomic History    Marital status:      Spouse name: Not on file    Number of children: 2D    Years of education: 2y college    Highest education level: Not on file   Occupational History    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[employer]     Employer: OTHER   Tobacco Use    Smoking status: Never    Smokeless tobacco: Never   Vaping Use    Vaping status: Never Used   Substance and Sexual Activity    Alcohol use: Yes     Alcohol/week: 4.2 - 8.4 oz     Types: 7 - 14 Glasses of wine per week     Comment: wine 4 days a week    Drug use: No    Sexual activity: Yes     Partners: Male     Birth control/protection: Post-Menopausal   Other Topics Concern    Not on file   Social History Narrative    Not on file     Social Determinants of  Health     Financial Resource Strain: Not on file   Food Insecurity: Not on file   Transportation Needs: Not on file   Physical Activity: Sufficiently Active (12/27/2021)    Exercise Vital Sign     Days of Exercise per Week: 3 days     Minutes of Exercise per Session: 50 min   Stress: No Stress Concern Present (12/27/2021)    [place] [employer] - Occupational Stress Questionnaire     Feeling of Stress : Only a little   Social Connections: Unknown (12/27/2021)    Social Connection and Isolation Panel [NHANES]     Frequency of Communication with Friends and Family: Not on file     Frequency of Social Gatherings with Friends and Family: Not on file     Attends Yarsanism Services: Not on file     Active Member of Clubs or Organizations: No     Attends Club or Organization Meetings: Not on file     Marital Status: Not on file   Intimate Partner Violence: Not on file   Housing Stability: Not on file       Patient Active Problem List    Diagnosis Date Noted    Menopause 11/06/2023    Multiple thyroid nodules 07/20/2018    Fatty liver 06/22/2018    Chronic dryness of both eyes 12/27/2016    Vitamin D deficiency 09/22/2016    TED on CPAP 03/23/2016    Hyperactivity (behavior)     Type 2 diabetes mellitus without complication, without long-term current use of insulin (HCC)     Essential hypertension     Dyslipidemia          Current Outpatient Medications   Medication Sig Dispense Refill    Semaglutide, 1 MG/DOSE, (OZEMPIC, 1 MG/DOSE,) 4 MG/3ML Solution Pen-injector Inject 1 mg under the skin every 7 days. 3 mL 5    lisinopril-hydrochlorothiazide (PRINZIDE) 20-25 MG per tablet Take 1 Tablet by mouth every day. 90 Tablet 3    Empagliflozin-metFORMIN HCl ER (SYNJARDY XR)  MG TABLET SR 24 HR Take 1 Tablet by mouth every day. 90 Tablet 3    atorvastatin (LIPITOR) 10 MG Tab Take 1 Tablet by mouth every day. 90 Tablet 3    Blood Glucose Monitoring Suppl Device Meter: Dispense Device of Insurance  "Preference. Sig. Use as directed for blood sugar monitoring. #1. NR. 1 Each 0    Blood Glucose Test Strips Test strips order: Test strips for One Touch Ultra 2 meter. Sig: use twice daily and prn ssx high or low sugar #100 RF x 3 100 Strip 3    tretinoin (RETIN-A) 0.05 % cream   2    Triamcinolone Acetonide (NASACORT AQ NA) Spray  in nose.      Cholecalciferol (VITAMIN D PO) Take 5,000 Caps by mouth.      Lancets Misc 1 Each by Does not apply route 3 times a day. 100 Each 2     No current facility-administered medications for this visit.     No Known Allergies    Review of Systems     Denies chest pain, shortness of breath, headaches or dizziness. Positive for intermittent nausea, intermittent constipation     Objective:     Vital signs reviewed   /80 (BP Location: Left arm, Patient Position: Sitting, BP Cuff Size: Adult)   Pulse 74   Temp 36.4 °C (97.5 °F) (Temporal)   Ht 1.6 m (5' 3\")   Wt 74.4 kg (164 lb)   SpO2 99%   BMI 29.05 kg/m²   Body mass index is 29.05 kg/m².  Wt Readings from Last 4 Encounters:   09/19/24 74.4 kg (164 lb)   03/14/24 76.2 kg (168 lb)   11/06/23 78 kg (172 lb)   08/30/23 74.4 kg (164 lb)       Physical Exam:  Constitutional: Well-developed and well-nourished. Not diaphoretic. No distress.   Skin: Skin is warm and dry. No rash noted.  Head: Atraumatic without lesions.  Eyes: Conjunctivae and extraocular motions are normal. Pupils are equal, round, and reactive to light. No scleral icterus.   Ears:  External ears unremarkable. Tympanic membranes clear and intact.  Nose: Nares patent. Septum midline. Turbinates without erythema nor edema. No discharge.   Mouth/Throat: Dentition is intact. Tongue normal. Oropharynx is clear and moist. Posterior pharynx without erythema or exudates.  Neck: Supple, trachea midline. Normal range of motion. No lymphadenopathy--cervical or supraclavicular.  Cardiovascular: Regular rate and rhythm, S1 and S2 without murmur, rubs, or gallops.  Lungs: " Normal inspiratory effort, CTA bilaterally, no wheezes/rhonchi/rales  Abdomen: Soft, non tender, and without distention. Active bowel sounds in all four quadrants. No rebound, guarding, masses or HSM.  Extremities: No cyanosis, clubbing, erythema, nor edema. Distal pulses intact and symmetric.   Musculoskeletal: All major joints AROM full in all directions without pain.  Neurological: Alert and oriented x 3.   Psychiatric:  Behavior, mood, and affect are appropriate.      Monofilament testing with a 10 gram force: sensation intact: intact bilaterally  Visual Inspection: Feet without maceration, ulcers, fissures.  Pedal pulses: intact bilaterally      Assessment and Plan:     1. Annual visit for general adult medical examination without abnormal findings  Acute uncomplicated problem.  Annual exam completed today. Discussion today about general wellness and lifestyle habits:  Engage in regular physical and social activities  Skin care, including sunscreen  Recommended annual eye exams and annual dental exams  Discussed wearing seatbelt when in car at all times     2. Type 2 diabetes mellitus without complication, without long-term current use of insulin (HCC)  Chronic exacerbated problem.  Continue follow-up with pharmacotherapy.  Continue Ozempic 1 mg weekly and Synjardy XR  mg daily.  We are requesting her retinal records.  Monofilament completed today.  Health maintenance labs ordered for March 2025.  - Diabetic Monofilament LE Exam  - Comp Metabolic Panel; Future  - MICROALBUMIN CREAT RATIO URINE; Future    3. Essential hypertension  Chronic stable problem.  Annual labs ordered for March 2025.  Continue lisinopril-hydrochlorothiazide 20-25 mg daily.  - Comp Metabolic Panel; Future    4. Dyslipidemia  Chronic stable problem.  Annual labs ordered for March 2025.  Continue atorvastatin 10 mg daily.  - Comp Metabolic Panel; Future  - Lipid Profile; Future    5. Multiple thyroid nodules  Chronic stable problem.   Continue follow-up with ENT.    6. TED on CPAP  Chronic stable problem.  Continue nightly CPAP.  Continue annual follow-up with sleep medicine.    7. Vitamin D deficiency  Chronic stable problem.  Annual labs ordered for March 2025.  Continue vitamin D 5000 units daily.  - VITAMIN D,25 HYDROXY (DEFICIENCY); Future      HCM:  encouraged shingle vaccine, requesting retinal screening records, monofilament completed, records for pap requested   Labs per orders  Immunizations per orders  Patient counseled about skin care, diet, supplements, prenatal vitamins, safe sex and exercise.    Follow-up: Return in about 6 months (around 3/19/2025) for Diabetes, cholesterol, Hypertension, Labs.      Please note that this dictation was created using voice recognition software. I have made every reasonable attempt to correct obvious errors, but I expect that there are errors of grammar and possibly content that I did not discover before finalizing the note.

## 2024-09-19 NOTE — LETTER
[facility]  CORRY Herr  [address] [place] [address]  Fax: [phone]   Authorization for Release/Disclosure of   Protected Health Information   Name: DORA TAMEZ : 1970 SSN: [id number]   Address: [facility] [place] [address] Phone:    [phone] (home) [phone] (work)   I authorize the entity listed below to release/disclose the PHI below to:   [facility]/CORRY Herr and CORRY Herr   Provider or Entity Name:  saritha hickye layo   Address   City, State, [facility]  Phone:      Fax:     Reason for request: continuity of care   Information to be released:    [  ] LAST COLONOSCOPY,  including any PATH REPORT and follow-up  [  ] LAST FIT/COLOGUARD RESULT [  ] LAST DEXA  [  ] LAST MAMMOGRAM  [  ] LAST PAP  [  ] LAST LABS [xx ] RETINA EXAM REPORT  [  ] IMMUNIZATION RECORDS  [  ] Release all info      [  ] Check here and initial the line next to each item to release ALL health information INCLUDING  _____ Care and treatment for drug and / or alcohol abuse  _____ HIV testing, infection status, or AIDS  _____ Genetic Testing    DATES OF SERVICE OR TIME PERIOD TO BE DISCLOSED: _____________  I understand and acknowledge that:  * This Authorization may be revoked at any time by you in writing, except if your health information has already been used or disclosed.  * Your health information that will be used or disclosed as a result of you signing this authorization could be re-disclosed by the recipient. If this occurs, your re-disclosed health information may no longer be protected by State or Federal laws.  * You may refuse to sign this Authorization. Your refusal will not affect your ability to obtain treatment.  * This Authorization becomes effective upon signing and will  on (date) __________.      If no date is indicated, this Authorization will  one (1) year from the signature date.    Name: Dora  Mojgan Sheldon  Signature: Date:   9/19/2024     PLEASE FAX REQUESTED RECORDS BACK TO: [phone]

## 2024-09-19 NOTE — ASSESSMENT & PLAN NOTE
Blood pressure today 108/80.  Continues lisinopril-hydrochlorothiazide 20-25 mg daily.  She will check her blood pressure occasionally at the store and reports that her readings are <140/90.

## 2024-09-19 NOTE — LETTER
[facility]  CORRY Herr  [address] [place] [address]  Fax: [phone]   Authorization for Release/Disclosure of   Protected Health Information   Name: DORA SHELDON : 1970 SSN: [id number]   Address: [facility] [place] [address] Phone:    [phone] (home) [phone] (work)   I authorize the entity listed below to release/disclose the PHI below to:   [facility]/CORRY Herr and CORRY Herr   Provider or Entity Name:  Dr. Sandoval    Address   City, [facility], [facility]   Phone:      Fax:     Reason for request: continuity of care   Information to be released:    [  ] LAST COLONOSCOPY,  including any PATH REPORT and follow-up  [  ] LAST FIT/COLOGUARD RESULT [  ] LAST DEXA  [  ] LAST MAMMOGRAM  [xxx ] LAST PAP  [  ] LAST LABS [  ] RETINA EXAM REPORT  [  ] IMMUNIZATION RECORDS  [  ] Release all info      [  ] Check here and initial the line next to each item to release ALL health information INCLUDING  _____ Care and treatment for drug and / or alcohol abuse  _____ HIV testing, infection status, or AIDS  _____ Genetic Testing    DATES OF SERVICE OR TIME PERIOD TO BE DISCLOSED: _____________  I understand and acknowledge that:  * This Authorization may be revoked at any time by you in writing, except if your health information has already been used or disclosed.  * Your health information that will be used or disclosed as a result of you signing this authorization could be re-disclosed by the recipient. If this occurs, your re-disclosed health information may no longer be protected by State or Federal laws.  * You may refuse to sign this Authorization. Your refusal will not affect your ability to obtain treatment.  * This Authorization becomes effective upon signing and will  on (date) __________.      If no date is indicated, this Authorization will  one (1) year from the signature date.    Name: Dora Sehldon  Signature:  Date:   9/19/2024     PLEASE FAX REQUESTED RECORDS BACK TO: [phone]

## 2024-09-19 NOTE — LETTER
Albuterol 108  Asthma & COPD Medication Protocol [id number]/08/2024 12:08 AM   Protocol Details Asthma Action Score greater than or equal to 20    AAP/ACT given in last 12 months   Filled 2-9-24  Qty 1  0 refills  No future appointments.  LOV 5-29-21    Ayana Acharya, ARCHANA       2/8/24 12:06 PM  Note  Dr. Madrid refilled it yesterday for him. But if he wants further refills then Pt will need to come in per Dr. Madrid. Pt is way over due for an appt, Px, etc.         [facility]  CORRY Herr  [address] [place] [address]  Fax: [phone]   Authorization for Release/Disclosure of   Protected Health Information   Name: DORA TAMEZ : 1970 SSN: [id number]   Address: [address] [place], [place] [address] [address] Phone:    [phone] (home) [phone] (work)   I authorize the entity listed below to release/disclose the PHI below to:   [facility]/CORRY Herr and CORRY Herr   Provider or Entity Name:  Dr. Nura Najera    Address   City, [facility] [address], [facility]   Phone:      Fax:     Reason for request: continuity of care   Information to be released:    [  ] LAST COLONOSCOPY,  including any PATH REPORT and follow-up  [  ] LAST FIT/COLOGUARD RESULT [  ] LAST DEXA  [  ] LAST MAMMOGRAM  [  ] LAST PAP  [  ] LAST LABS [  ] RETINA EXAM REPORT  [  ] IMMUNIZATION RECORDS  [xxx] Release last year of records      [  ] Check here and initial the line next to each item to release ALL health information INCLUDING  _____ Care and treatment for drug and / or alcohol abuse  _____ HIV testing, infection status, or AIDS  _____ Genetic Testing    DATES OF SERVICE OR TIME PERIOD TO BE DISCLOSED: _____________  I understand and acknowledge that:  * This Authorization may be revoked at any time by you in writing, except if your health information has already been used or disclosed.  * Your health information that will be used or disclosed as a result of you signing this authorization could be re-disclosed by the recipient. If this occurs, your re-disclosed health information may no longer be protected by State or Federal laws.  * You may refuse to sign this Authorization. Your refusal will not affect your ability to obtain treatment.  * This Authorization becomes effective upon signing and will  on (date) __________.      If no date is indicated, this Authorization will  one (1) year from the signature date.    Name: Dora Ulrich  Pito  Signature: Date:   9/19/2024     PLEASE FAX REQUESTED RECORDS BACK TO: [phone]

## 2024-09-19 NOTE — ASSESSMENT & PLAN NOTE
Recent A1C 7.1%. Continues Ozempic 1 mg weekly and Synjardy XR  mg daily.  She notes that the increase in Ozempic has been upsetting her stomach.  She is currently following with pharmacotherapy.

## 2024-10-03 ENCOUNTER — TELEPHONE (OUTPATIENT)
Dept: HEALTH INFORMATION MANAGEMENT | Facility: OTHER | Age: 54
End: 2024-10-03
Payer: COMMERCIAL

## 2024-10-03 DIAGNOSIS — M54.50 CHRONIC LOW BACK PAIN, UNSPECIFIED BACK PAIN LATERALITY, UNSPECIFIED WHETHER SCIATICA PRESENT: ICD-10-CM

## 2024-10-03 DIAGNOSIS — G89.29 CHRONIC LOW BACK PAIN, UNSPECIFIED BACK PAIN LATERALITY, UNSPECIFIED WHETHER SCIATICA PRESENT: ICD-10-CM

## 2024-11-06 ENCOUNTER — OFFICE VISIT (OUTPATIENT)
Dept: SLEEP MEDICINE | Facility: MEDICAL CENTER | Age: 54
End: 2024-11-06
Attending: NURSE PRACTITIONER
Payer: COMMERCIAL

## 2024-11-06 VITALS
SYSTOLIC BLOOD PRESSURE: 122 MMHG | WEIGHT: 162 LBS | HEIGHT: 63 IN | BODY MASS INDEX: 28.7 KG/M2 | HEART RATE: 81 BPM | DIASTOLIC BLOOD PRESSURE: 82 MMHG | RESPIRATION RATE: 16 BRPM | OXYGEN SATURATION: 97 %

## 2024-11-06 DIAGNOSIS — G47.33 OSA ON CPAP: Chronic | ICD-10-CM

## 2024-11-06 DIAGNOSIS — Z78.9 NONSMOKER: ICD-10-CM

## 2024-11-06 PROCEDURE — 99212 OFFICE O/P EST SF 10 MIN: CPT | Performed by: NURSE PRACTITIONER

## 2024-11-06 PROCEDURE — 3074F SYST BP LT 130 MM HG: CPT | Performed by: NURSE PRACTITIONER

## 2024-11-06 PROCEDURE — 3079F DIAST BP 80-89 MM HG: CPT | Performed by: NURSE PRACTITIONER

## 2024-11-06 PROCEDURE — 99213 OFFICE O/P EST LOW 20 MIN: CPT | Performed by: NURSE PRACTITIONER

## 2024-11-06 ASSESSMENT — FIBROSIS 4 INDEX: FIB4 SCORE: 0.58

## 2024-11-06 NOTE — PROGRESS NOTES
Chief Complaint   Patient presents with    Follow-Up     Last Office Visit 11/6/2023 with SHANTA CARRIZALES   CPAP 11 cm       HPI:  Dora Sheldon is a 54 y.o. year old female here today for follow-up on TED.  Last OV 11/6/23     Currently using CPAP @ 11cm H20 nightly; RESMED; device obtained 2022.    Compliance report 7/24 through 11/5/2024 indicates 97% compliance, average nightly use 6.5 hours, moderate mask leak with reduced AHI of 0.2/h.  Reviewed with patient.    Tolerates mask and pressure well.  She is obtaining replacement supplies.  She denies morning headaches or shortness of breath.  She goes to bed at 9/9:30 PM since the time change and usually waking around 4 AM Breo to resume sleep till 5:30 AM.  She notes energy levels consistent during the day.  Overall she continues to find benefit from therapy.    Sleep hx:  Prior Dr. Agarwal patient diagnosed in 2016 with home sleep study noting AHI 37/h.  She was placed on CPAP 11 cm.    ROS: As per HPI and otherwise negative if not stated.    Past Medical History:   Diagnosis Date    Anxiety     Diabetes mellitus, type 2 (HCC)     Dyslipidemia     HTN (hypertension)     Hypertension     Obesity 5/22/2019    Sleep apnea        Past Surgical History:   Procedure Laterality Date    HYSTEROSCOPY NOVASURE-2  2010    Dr. Sandoval    ABDOMINOPLASTY  1999    Dr. Ricci    TONSILLECTOMY         Family History   Problem Relation Age of Onset    Hypertension Mother     Hypertension Father     Heart Attack Father     Psychiatric Illness Paternal Uncle     Heart Disease Maternal Grandmother     Hypertension Maternal Grandmother     Heart Disease Maternal Grandfather     Hypertension Maternal Grandfather     Diabetes Maternal Uncle     Sleep Apnea Neg Hx        Social History     Socioeconomic History    Marital status:      Spouse name: Not on file    Number of children: 2D    Years of education: 2y college    Highest education level: Not on file   Occupational  "History    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[employer]     Employer: OTHER   Tobacco Use    Smoking status: Never    Smokeless tobacco: Never   Vaping Use    Vaping status: Never Used   Substance and Sexual Activity    Alcohol use: Yes     Alcohol/week: 4.2 - 8.4 oz     Types: 7 - 14 Glasses of wine per week     Comment: wine 4 days a week    Drug use: No    Sexual activity: Yes     Partners: Male     Birth control/protection: Post-Menopausal   Other Topics Concern    Not on file   Social History Narrative    Not on file     Social Drivers of Health     Financial Resource Strain: Not on file   Food Insecurity: Not on file   Transportation Needs: Not on file   Physical Activity: Sufficiently Active (12/27/2021)    Exercise Vital Sign     Days of Exercise per Week: 3 days     Minutes of Exercise per Session: 50 min   Stress: No Stress Concern Present (12/27/2021)    [employer] - Occupational Stress Questionnaire     Feeling of Stress : Only a little   Social Connections: Unknown (12/27/2021)    Social Connection and Isolation Panel [NHANES]     Frequency of Communication with Friends and Family: Not on file     Frequency of Social Gatherings with Friends and Family: Not on file     Attends Restorationism Services: Not on file     Active Member of Clubs or Organizations: No     Attends Club or Organization Meetings: Not on file     Marital Status: Not on file   Intimate Partner Violence: Not on file   Housing Stability: Not on file       Allergies as of 11/06/2024    (No Known Allergies)        Vitals:  /82 (BP Location: Left arm, Patient Position: Sitting, BP Cuff Size: Adult)   Pulse 81   Resp 16   Ht 1.6 m (5' 3\")   Wt 73.5 kg (162 lb)   SpO2 97%     Current medications as of today   Current Outpatient Medications   Medication Sig Dispense Refill    Semaglutide, 1 MG/DOSE, (OZEMPIC, 1 MG/DOSE,) 4 MG/3ML Solution Pen-injector Inject 1 mg under the skin every 7 " days. 3 mL 5    lisinopril-hydrochlorothiazide (PRINZIDE) 20-25 MG per tablet Take 1 Tablet by mouth every day. 90 Tablet 3    Empagliflozin-metFORMIN HCl ER (SYNJARDY XR)  MG TABLET SR 24 HR Take 1 Tablet by mouth every day. 90 Tablet 3    atorvastatin (LIPITOR) 10 MG Tab Take 1 Tablet by mouth every day. 90 Tablet 3    Blood Glucose Monitoring Suppl Device Meter: Dispense Device of Insurance Preference. Sig. Use as directed for blood sugar monitoring. #1. NR. 1 Each 0    Blood Glucose Test Strips Test strips order: Test strips for One Touch Ultra 2 meter. Sig: use twice daily and prn ssx high or low sugar #100 RF x 3 100 Strip 3    tretinoin (RETIN-A) 0.05 % cream   2    Triamcinolone Acetonide (NASACORT AQ NA) Spray  in nose.      Cholecalciferol (VITAMIN D PO) Take 5,000 Caps by mouth.      Lancets Misc 1 Each by Does not apply route 3 times a day. 100 Each 2     No current facility-administered medications for this visit.         Physical Exam:   Gen:           Alert and oriented, No apparent distress. Mood and affect appropriate, normal interaction with examiner.  Eyes:          PERRL, EOM intact, sclere white, conjunctive moist.  Ears:          Not examined.   Hearing:     Grossly intact.  Nose:          Normal, no lesions or deformities.  Dentition:    Not examined.   Oropharynx:   Not examined.   Mallampati Classification: Not examined.   Neck:        Supple, trachea midline, no masses.  Respiratory Effort: No intercostal retractions or use of accessory muscles.   Lung Auscultation:      Clear to auscultation bilaterally; no rales, rhonchi or wheezing.  CV:            Regular rate and rhythm. No murmurs, rubs or gallops.  Abd:           Not examined.   Lymphadenopathy: Not examined.  Gait and Station: Normal.  Digits and Nails: No clubbing, cyanosis, petechiae, or nodes.   Cranial Nerves: II-XII grossly intact.  Skin:        No rashes, lesions or ulcers noted.               Ext:           No cyanosis  or edema.      Assessment:  1. TED on CPAP        2. BMI 28.0-28.9,adult        3. Nonsmoker            Immunizations:    Flu:declines  Pneumovax 23:not due  Prevnar 13:not due  PCV 20: not due  COVID-19: 2022    Plan:  TED is well treated.  She will continue to benefit from nightly CPAP therapy.   DME mask/supplies  Encourage  healthy diet and regular activity for conditioning.  Follow-up in 1 year with compliance report, sooner if needed.    Please note that this dictation was created using voice recognition software. I have made every reasonable attempt to correct obvious errors, but it is possible there are errors of grammar and possibly content that I did not discover before finalizing the note.

## 2024-12-10 ENCOUNTER — OFFICE VISIT (OUTPATIENT)
Dept: MEDICAL GROUP | Facility: PHYSICIAN GROUP | Age: 54
End: 2024-12-10
Payer: COMMERCIAL

## 2024-12-10 VITALS
HEART RATE: 84 BPM | BODY MASS INDEX: 28.53 KG/M2 | HEIGHT: 63 IN | OXYGEN SATURATION: 98 % | WEIGHT: 161 LBS | RESPIRATION RATE: 14 BRPM

## 2024-12-10 DIAGNOSIS — E11.9 TYPE 2 DIABETES MELLITUS WITHOUT COMPLICATION, WITHOUT LONG-TERM CURRENT USE OF INSULIN (HCC): ICD-10-CM

## 2024-12-10 LAB
HBA1C MFR BLD: 6.4 % (ref ?–5.8)
POCT INT CON NEG: NEGATIVE
POCT INT CON POS: POSITIVE

## 2024-12-10 PROCEDURE — 83036 HEMOGLOBIN GLYCOSYLATED A1C: CPT | Performed by: NURSE PRACTITIONER

## 2024-12-10 PROCEDURE — 99401 PREV MED CNSL INDIV APPRX 15: CPT | Performed by: NURSE PRACTITIONER

## 2024-12-10 ASSESSMENT — FIBROSIS 4 INDEX: FIB4 SCORE: 0.58

## 2024-12-10 NOTE — PROGRESS NOTES
Patient Consult Note - Follow Up Visit  Primary care physician: CORRY Herr    Reason for consult: Management of Uncontrolled Type 2 Diabetes    Time IN:  7:29am  Time OUT:  7:50am    HPI:  Dora Sheldon is a 54 y.o. old patient who comes in today for evaluation of above stated problem.    Most Recent HbA1c:   Lab Results   Component Value Date/Time    HBA1C 6.4 (A) 12/10/2024 07:35 AM        Current Diabetes Regimen:  GLP-1 Agent: Ozempic 1mg SQ once weekly  Metformin + SGLT-2 Inhibitor: Synjardy XR 25-1000mg QD     Previously attempted medications  Glipizide - unknown reason for d/c  Trulicity - backorder, changed to Ozempic    Discussed FBG goal of , 2hrPP <180, and A1c <7.0%.  Before Breakfast: monitor recently broke, will send prescription for new machine.  Before Lunch:  Before Dinner:  Before Bedtime:  Other times:  Hypoglycemia:  None    Lifestyle:  Continues to be mindful on nutrition.  Cutting back on alcohol intake.  Finds that certain foods no longer appealing, trouble with rich foods and pastas.  Keeping portions under good control.  Weight stable at this time.  Exercise sporadic, but trying to concentrate on getting back to consistent walking and/or gym work.    Previous visit notes:  Has increased her exercise over the last several weeks, getting back to regular walking.  She is hope to get back to previous levels of walking on daily basis and further on the weekends.  Nutrition habits on par with previous visit.  Eating more fruits in the form of berries and watermelon recently.  She does feel that her total caloric intake is down and feeling more satiated on this dose of Ozempic.    Preventative Management  BP regimen (ACEi/ARB): Lisinopril-HCTZ 20-25mg QD  BP <140/90: Yes  Statin: atorvastatin (Lipitor) 10 mg daily  LDL <100: Yes  Lab Results   Component Value Date/Time    CHOLSTRLTOT 118 03/04/2024 07:04 AM    LDL 58 03/04/2024 07:04 AM    HDL 43 03/04/2024 07:04 AM     TRIGLYCERIDE 87 03/04/2024 07:04 AM       Last Microalbumin/Cr:  Lab Results   Component Value Date/Time    MALBCRT 16 03/14/2024 10:22 AM    MICROALBUR 2.1 03/14/2024 10:22 AM     Last A1c:  Lab Results   Component Value Date/Time    HBA1C 6.4 (A) 12/10/2024 07:35 AM        Monofilament exam: up to date, last done 9/2024       REC DM Score: 0  Care gaps addressed:   N/A  Care gaps updated in Health Maintenance      ROS:  Constitutional: No weight loss  Cardiac: No palpitations or racing heart  Resp: No shortness of breath  Neuro: No numbness or tinging in feet  Endo: No heat or cold intolerance, no polyuria or polydipsia  All other systems were reviewed and were negative.    Past Medical History:  Patient Active Problem List    Diagnosis Date Noted    Menopause 11/06/2023    Multiple thyroid nodules 07/20/2018    Fatty liver 06/22/2018    Chronic dryness of both eyes 12/27/2016    Vitamin D deficiency 09/22/2016    TED on CPAP 03/23/2016    Hyperactivity (behavior)     Type 2 diabetes mellitus without complication, without long-term current use of insulin (HCC)     Essential hypertension     Dyslipidemia        Past Surgical History:  Past Surgical History:   Procedure Laterality Date    HYSTEROSCOPY NOVASURE-2  2010    Dr. Sandoval    ABDOMINOPLASTY  1999    Dr. Ricci    TONSILLECTOMY         Allergies:  Patient has no known allergies.    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[employer]     Employer: OTHER   Tobacco Use    Smoking status: Never    Smokeless tobacco: Never   Vaping Use    Vaping status: Never Used   Substance and Sexual Activity    Alcohol use: Yes     Alcohol/week: 4.2 - 8.4 oz     Types: 7 - 14 Glasses of wine per week     Comment: wine 4 days a  week    Drug use: No    Sexual activity: Yes     Partners: Male     Birth control/protection: Post-Menopausal   Other Topics Concern    Not on file   Social History Narrative    Not on file     Social Drivers of Health     Financial Resource Strain: Not on file   Food Insecurity: Not on file   Transportation Needs: Not on file   Physical Activity: Sufficiently Active (12/27/2021)    Exercise Vital Sign     Days of Exercise per Week: 3 days     Minutes of Exercise per Session: 50 min   Stress: No Stress Concern Present (12/27/2021)    [employer] - Occupational Stress Questionnaire     Feeling of Stress : Only a little   Social Connections: Unknown (12/27/2021)    Social Connection and Isolation Panel [NHANES]     Frequency of Communication with Friends and Family: Not on file     Frequency of Social Gatherings with Friends and Family: Not on file     Attends Religion Services: Not on file     Active Member of Clubs or Organizations: No     Attends Club or Organization Meetings: Not on file     Marital Status: Not on file   Intimate Partner Violence: Not on file   Housing Stability: Not on file       Family History:  Family History   Problem Relation Age of Onset    Hypertension Mother     Hypertension Father     Heart Attack Father     Psychiatric Illness Paternal Uncle     Heart Disease Maternal Grandmother     Hypertension Maternal Grandmother     Heart Disease Maternal Grandfather     Hypertension Maternal Grandfather     Diabetes Maternal Uncle     Sleep Apnea Neg Hx        Medications:    Current Outpatient Medications:     Semaglutide, 1 MG/DOSE, (OZEMPIC, 1 MG/DOSE,) 4 MG/3ML Solution Pen-injector, Inject 1 mg under the skin every 7 days., Disp: 3 mL, Rfl: 5    lisinopril-hydrochlorothiazide (PRINZIDE) 20-25 MG per tablet, Take 1 Tablet by mouth every day., Disp: 90 Tablet, Rfl: 3    Empagliflozin-metFORMIN HCl ER (SYNJARDY XR)  MG TABLET SR 24 HR, Take 1 Tablet by mouth  "every day., Disp: 90 Tablet, Rfl: 3    atorvastatin (LIPITOR) 10 MG Tab, Take 1 Tablet by mouth every day., Disp: 90 Tablet, Rfl: 3    Blood Glucose Monitoring Suppl Device, Meter: Dispense Device of Insurance Preference. Sig. Use as directed for blood sugar monitoring. #1. NR., Disp: 1 Each, Rfl: 0    Blood Glucose Test Strips, Test strips order: Test strips for One Touch Ultra 2 meter. Sig: use twice daily and prn ssx high or low sugar #100 RF x 3, Disp: 100 Strip, Rfl: 3    tretinoin (RETIN-A) 0.05 % cream, , Disp: , Rfl: 2    Triamcinolone Acetonide (NASACORT AQ NA), Spray  in nose., Disp: , Rfl:     Cholecalciferol (VITAMIN D PO), Take 5,000 Caps by mouth., Disp: , Rfl:     Lancets Misc, 1 Each by Does not apply route 3 times a day., Disp: 100 Each, Rfl: 2    Labs: Reviewed    Physical Examination:  Vital signs: Pulse 84   Resp 14   Ht 1.6 m (5' 2.99\")   Wt 73 kg (161 lb)   SpO2 98%   BMI 28.53 kg/m²  Body mass index is 28.53 kg/m².  General: No apparent distress, cooperative  Eyes: No scleral icterus or discharge  ENMT: Normal on external inspection of nose, lips, normal thyroid exam  Neck: No abnormal masses on inspection  Resp: Normal effort, clear to auscultation bilaterally   CVS: Regular rate and rhythm, S1 S2 normal, no murmur   Extremities: No edema  Abdomen: abdominal obesity present  Neuro: Alert and oriented  Skin: No rash  Psych: Normal mood and affect, intact memory and able to make informed decisions    Assessment and Plan:    Basic physiology of DMII was explained to patient as well as microvascular/macrovascular complications. The importance of increasing physical activity to improve diabetes control was discussed with the patient. Patient was also educated on changing diet and making better choices to help control blood sugar.    Patient is optimized on Synjardy.  Tolerating current dose of Ozempic.  No home FBG at this time as glucometer broke during recent move.  A1c drawn today, has " improved to goal at 6.4% (down from 7.1%).     Nutrition largely unchanged from previous visit.  Keeping good portion control.  Weight is stable.  No further noted appetite suppression at this time.  Finding that taste for certain foods changed, diminished cravings/taste for pastas, alcohol, and rich foods.  As above, sporadic exercise at this time, feels she will be able to increase in the coming weeks/months.     Will continue all current medications for now.  Stressed importance of optimal nutrition habits, maintain good portion control, continue to focus on low carb/high protein habits.  Try to keep work stress levels low.  Continue to increase exercise, would highly recommend at least 150-180 min/week.    Follow Up:  Three months for A1c    Thank you for allowing me to participate in the care of this patient.    Greyson Arreaga, PharmD, BCACP  12/10/24    CC:   CORRY Herr

## 2025-01-17 DIAGNOSIS — E11.9 TYPE 2 DIABETES MELLITUS WITHOUT COMPLICATION, WITHOUT LONG-TERM CURRENT USE OF INSULIN (HCC): ICD-10-CM

## 2025-01-17 NOTE — TELEPHONE ENCOUNTER
Received request via: Patient    Was the patient seen in the last year in this department? Yes    Does the patient have an active prescription (recently filled or refills available) for medication(s) requested? No    Ph[facility]    Does the patient have USP Plus and need 100-day supply? (This applies to ALL medications) Patient does not have SCP

## 2025-01-20 RX ORDER — SEMAGLUTIDE 1.34 MG/ML
1 INJECTION, SOLUTION SUBCUTANEOUS
Qty: 3 ML | Refills: 5 | Status: SHIPPED | OUTPATIENT
Start: 2025-01-20

## 2025-01-20 NOTE — TELEPHONE ENCOUNTER
Requested Prescriptions     Signed Prescriptions Disp Refills    Semaglutide, 1 MG/DOSE, (OZEMPIC, 1 MG/DOSE,) 4 MG/3ML Solution Pen-injector 3 mL 5     Sig: INJECT 1 MG UNDER THE SKIN EVERY 7 DAYS     Authorizing Provider: CONNOR ELLIOTT A.P.R.N.

## 2025-02-07 ENCOUNTER — APPOINTMENT (OUTPATIENT)
Dept: URBAN - METROPOLITAN AREA CLINIC 22 | Facility: CLINIC | Age: 55
Setting detail: DERMATOLOGY
End: 2025-02-07

## 2025-02-07 DIAGNOSIS — L81.1 CHLOASMA: ICD-10-CM

## 2025-02-07 DIAGNOSIS — D22 MELANOCYTIC NEVI: ICD-10-CM

## 2025-02-07 DIAGNOSIS — B36.0 PITYRIASIS VERSICOLOR: ICD-10-CM

## 2025-02-07 DIAGNOSIS — L81.4 OTHER MELANIN HYPERPIGMENTATION: ICD-10-CM

## 2025-02-07 DIAGNOSIS — L82.1 OTHER SEBORRHEIC KERATOSIS: ICD-10-CM

## 2025-02-07 DIAGNOSIS — Z71.89 OTHER SPECIFIED COUNSELING: ICD-10-CM

## 2025-02-07 PROBLEM — D22.4 MELANOCYTIC NEVI OF SCALP AND NECK: Status: ACTIVE | Noted: 2025-02-07

## 2025-02-07 PROBLEM — D22.21 MELANOCYTIC NEVI OF RIGHT EAR AND EXTERNAL AURICULAR CANAL: Status: ACTIVE | Noted: 2025-02-07

## 2025-02-07 PROBLEM — D22.5 MELANOCYTIC NEVI OF TRUNK: Status: ACTIVE | Noted: 2025-02-07

## 2025-02-07 PROBLEM — D22.22 MELANOCYTIC NEVI OF LEFT EAR AND EXTERNAL AURICULAR CANAL: Status: ACTIVE | Noted: 2025-02-07

## 2025-02-07 PROCEDURE — ? SUNSCREEN TREATMENT REGIMEN

## 2025-02-07 PROCEDURE — ? COUNSELING

## 2025-02-07 PROCEDURE — ? TREATMENT REGIMEN

## 2025-02-07 PROCEDURE — ? PRESCRIPTION

## 2025-02-07 PROCEDURE — ? KOH PREP

## 2025-02-07 PROCEDURE — 99213 OFFICE O/P EST LOW 20 MIN: CPT

## 2025-02-07 RX ORDER — KETOCONAZOLE 20 MG/G
CREAM TOPICAL BID
Qty: 120 | Refills: 1 | Status: ERX | COMMUNITY
Start: 2025-02-07

## 2025-02-07 RX ORDER — FLUOCINOLONE ACETONIDE, HYDROQUINONE, AND TRETINOIN .1; 40; .5 MG/G; MG/G; MG/G
CREAM TOPICAL QHS
Qty: 30 | Refills: 3 | Status: ERX

## 2025-02-07 RX ADMIN — KETOCONAZOLE: 20 CREAM TOPICAL at 00:00

## 2025-02-07 ASSESSMENT — LOCATION SIMPLE DESCRIPTION DERM
LOCATION SIMPLE: SCALP
LOCATION SIMPLE: RIGHT FOREARM
LOCATION SIMPLE: RIGHT POSTERIOR THIGH
LOCATION SIMPLE: LEFT FOREARM
LOCATION SIMPLE: RIGHT BREAST
LOCATION SIMPLE: LEFT BREAST
LOCATION SIMPLE: RIGHT FOREHEAD
LOCATION SIMPLE: LEFT CHEEK
LOCATION SIMPLE: RIGHT EAR
LOCATION SIMPLE: UPPER BACK
LOCATION SIMPLE: LEFT EAR
LOCATION SIMPLE: RIGHT CHEEK
LOCATION SIMPLE: LEFT UPPER BACK
LOCATION SIMPLE: INFERIOR FOREHEAD

## 2025-02-07 ASSESSMENT — LOCATION DETAILED DESCRIPTION DERM
LOCATION DETAILED: LEFT SUPERIOR MEDIAL UPPER BACK
LOCATION DETAILED: RIGHT SUPERIOR CENTRAL MALAR CHEEK
LOCATION DETAILED: RIGHT VENTRAL PROXIMAL FOREARM
LOCATION DETAILED: LEFT CENTRAL MALAR CHEEK
LOCATION DETAILED: RIGHT MEDIAL FOREHEAD
LOCATION DETAILED: LEFT INFERIOR HELIX
LOCATION DETAILED: INFERIOR MID FOREHEAD
LOCATION DETAILED: LEFT VENTRAL PROXIMAL FOREARM
LOCATION DETAILED: LEFT POSTERIOR EAR
LOCATION DETAILED: LEFT CENTRAL PARIETAL SCALP
LOCATION DETAILED: LEFT INFRAMAMMARY CREASE (INNER QUADRANT)
LOCATION DETAILED: RIGHT DISTAL POSTERIOR THIGH
LOCATION DETAILED: RIGHT INFRAMAMMARY CREASE (INNER QUADRANT)
LOCATION DETAILED: RIGHT LATERAL MALAR CHEEK
LOCATION DETAILED: SUPERIOR THORACIC SPINE
LOCATION DETAILED: RIGHT SUPERIOR HELIX

## 2025-02-07 ASSESSMENT — LOCATION ZONE DERM
LOCATION ZONE: SCALP
LOCATION ZONE: LEG
LOCATION ZONE: ARM
LOCATION ZONE: TRUNK
LOCATION ZONE: EAR
LOCATION ZONE: FACE

## 2025-02-07 NOTE — PROCEDURE: COUNSELING
Detail Level: Zone
Detail Level: Simple
Patient Specific Counseling (Will Not Stick From Patient To Patient): Discussed laser treatment and given Carli Alma card\\nWill plan on retin a when off Hydroquinone
Detail Level: Generalized

## 2025-03-10 ENCOUNTER — OFFICE VISIT (OUTPATIENT)
Dept: MEDICAL GROUP | Facility: PHYSICIAN GROUP | Age: 55
End: 2025-03-10
Payer: COMMERCIAL

## 2025-03-10 ENCOUNTER — HOSPITAL ENCOUNTER (OUTPATIENT)
Dept: LAB | Facility: MEDICAL CENTER | Age: 55
End: 2025-03-10
Attending: NURSE PRACTITIONER
Payer: COMMERCIAL

## 2025-03-10 VITALS
SYSTOLIC BLOOD PRESSURE: 134 MMHG | HEIGHT: 63 IN | DIASTOLIC BLOOD PRESSURE: 68 MMHG | OXYGEN SATURATION: 98 % | HEART RATE: 85 BPM | BODY MASS INDEX: 27.82 KG/M2 | WEIGHT: 157 LBS | TEMPERATURE: 98.2 F

## 2025-03-10 DIAGNOSIS — E11.9 TYPE 2 DIABETES MELLITUS WITHOUT COMPLICATION, WITHOUT LONG-TERM CURRENT USE OF INSULIN (HCC): ICD-10-CM

## 2025-03-10 DIAGNOSIS — E78.5 DYSLIPIDEMIA: ICD-10-CM

## 2025-03-10 DIAGNOSIS — E04.2 MULTIPLE THYROID NODULES: ICD-10-CM

## 2025-03-10 DIAGNOSIS — I10 ESSENTIAL HYPERTENSION: ICD-10-CM

## 2025-03-10 DIAGNOSIS — E55.9 VITAMIN D DEFICIENCY: ICD-10-CM

## 2025-03-10 LAB
25(OH)D3 SERPL-MCNC: 83 NG/ML (ref 30–100)
ALBUMIN SERPL BCP-MCNC: 4.6 G/DL (ref 3.2–4.9)
ALBUMIN/GLOB SERPL: 1.6 G/DL
ALP SERPL-CCNC: 84 U/L (ref 30–99)
ALT SERPL-CCNC: 28 U/L (ref 2–50)
ANION GAP SERPL CALC-SCNC: 14 MMOL/L (ref 7–16)
AST SERPL-CCNC: 22 U/L (ref 12–45)
BILIRUB SERPL-MCNC: 1.7 MG/DL (ref 0.1–1.5)
BUN SERPL-MCNC: 25 MG/DL (ref 8–22)
CALCIUM ALBUM COR SERPL-MCNC: 9.7 MG/DL (ref 8.5–10.5)
CALCIUM SERPL-MCNC: 10.2 MG/DL (ref 8.5–10.5)
CHLORIDE SERPL-SCNC: 100 MMOL/L (ref 96–112)
CHOLEST SERPL-MCNC: 152 MG/DL (ref 100–199)
CO2 SERPL-SCNC: 25 MMOL/L (ref 20–33)
CREAT SERPL-MCNC: 0.94 MG/DL (ref 0.5–1.4)
FASTING STATUS PATIENT QL REPORTED: NORMAL
GFR SERPLBLD CREATININE-BSD FMLA CKD-EPI: 71 ML/MIN/1.73 M 2
GLOBULIN SER CALC-MCNC: 2.9 G/DL (ref 1.9–3.5)
GLUCOSE SERPL-MCNC: 138 MG/DL (ref 65–99)
HDLC SERPL-MCNC: 56 MG/DL
LDLC SERPL CALC-MCNC: 76 MG/DL
POTASSIUM SERPL-SCNC: 3.8 MMOL/L (ref 3.6–5.5)
PROT SERPL-MCNC: 7.5 G/DL (ref 6–8.2)
SODIUM SERPL-SCNC: 139 MMOL/L (ref 135–145)
TRIGL SERPL-MCNC: 98 MG/DL (ref 0–149)
TSH SERPL DL<=0.005 MIU/L-ACNC: 1.51 UIU/ML (ref 0.38–5.33)

## 2025-03-10 PROCEDURE — 82570 ASSAY OF URINE CREATININE: CPT

## 2025-03-10 PROCEDURE — 84443 ASSAY THYROID STIM HORMONE: CPT

## 2025-03-10 PROCEDURE — 80053 COMPREHEN METABOLIC PANEL: CPT

## 2025-03-10 PROCEDURE — 3075F SYST BP GE 130 - 139MM HG: CPT | Performed by: NURSE PRACTITIONER

## 2025-03-10 PROCEDURE — 3078F DIAST BP <80 MM HG: CPT | Performed by: NURSE PRACTITIONER

## 2025-03-10 PROCEDURE — 80061 LIPID PANEL: CPT

## 2025-03-10 PROCEDURE — 36415 COLL VENOUS BLD VENIPUNCTURE: CPT

## 2025-03-10 PROCEDURE — 99214 OFFICE O/P EST MOD 30 MIN: CPT | Performed by: NURSE PRACTITIONER

## 2025-03-10 PROCEDURE — 82306 VITAMIN D 25 HYDROXY: CPT

## 2025-03-10 PROCEDURE — 82043 UR ALBUMIN QUANTITATIVE: CPT

## 2025-03-10 RX ORDER — LISINOPRIL AND HYDROCHLOROTHIAZIDE 20; 25 MG/1; MG/1
1 TABLET ORAL
Qty: 90 TABLET | Refills: 3 | Status: SHIPPED | OUTPATIENT
Start: 2025-03-10

## 2025-03-10 RX ORDER — KETOCONAZOLE 20 MG/G
CREAM TOPICAL
COMMUNITY
Start: 2025-02-07

## 2025-03-10 RX ORDER — FLUOCINOLONE ACETONIDE, HYDROQUINONE, AND TRETINOIN .1; 40; .5 MG/G; MG/G; MG/G
CREAM TOPICAL
COMMUNITY
Start: 2025-02-10

## 2025-03-10 RX ORDER — ATORVASTATIN CALCIUM 10 MG/1
10 TABLET, FILM COATED ORAL
Qty: 90 TABLET | Refills: 3 | Status: SHIPPED | OUTPATIENT
Start: 2025-03-10

## 2025-03-10 RX ORDER — EMPAGLIFLOZIN, METFORMIN HYDROCHLORIDE 25; 1000 MG/1; MG/1
1 TABLET, EXTENDED RELEASE ORAL DAILY
Qty: 90 TABLET | Refills: 3 | Status: SHIPPED | OUTPATIENT
Start: 2025-03-10

## 2025-03-10 ASSESSMENT — PATIENT HEALTH QUESTIONNAIRE - PHQ9: CLINICAL INTERPRETATION OF PHQ2 SCORE: 0

## 2025-03-10 NOTE — ASSESSMENT & PLAN NOTE
Recent labs completed today.  Continues vitamin D3 5000 units daily.  She has discussed with her GYN about night sweats and GYN recommending checking vitamin D level.

## 2025-03-10 NOTE — LETTER
[facility]  CORRY Herr  [address] [place] [address]  Fax: [phone]   Authorization for Release/Disclosure of   Protected Health Information   Name: DORA TAMEZ : 1970 SSN: [id number]   Address: [address] [place], [place] [address] [address] Phone:    [phone] (home) [phone] (work)   I authorize the entity listed below to release/disclose the PHI below to:   [facility]/CORRY Herr and CORRY Herr   Provider or Entity Name:  [facility]  Dr. Sandoval   Address   [facility], [facility], [facility]   Phone:      Fax:     Reason for request: continuity of care   Information to be released:    [  ] LAST COLONOSCOPY,  including any PATH REPORT and follow-up  [  ] LAST FIT/COLOGUARD RESULT [  ] LAST DEXA  [  ] LAST MAMMOGRAM  [xxx  ] LAST PAP  [  ] LAST LABS [  ] RETINA EXAM REPORT  [  ] IMMUNIZATION RECORDS  [  ] Release all info      [  ] Check here and initial the line next to each item to release ALL health information INCLUDING  _____ Care and treatment for drug and / or alcohol abuse  _____ HIV testing, infection status, or AIDS  _____ Genetic Testing    DATES OF SERVICE OR TIME PERIOD TO BE DISCLOSED: _____________  I understand and acknowledge that:  * This Authorization may be revoked at any time by you in writing, except if your health information has already been used or disclosed.  * Your health information that will be used or disclosed as a result of you signing this authorization could be re-disclosed by the recipient. If this occurs, your re-disclosed health information may no longer be protected by State or Federal laws.  * You may refuse to sign this Authorization. Your refusal will not affect your ability to obtain treatment.  * This Authorization becomes effective upon signing and will  on (date) __________.      If no date is indicated, this Authorization will  one (1) year from the signature date.    Name: Dora Ulrich  Pito  Signature: Date:   3/10/2025     PLEASE FAX REQUESTED RECORDS BACK TO: [phone]

## 2025-03-10 NOTE — LETTER
[facility]  CORRY Herr  [address] [place] [address]  Fax: [phone]   Authorization for Release/Disclosure of   Protected Health Information   Name: DORA TAMEZ : 1970 SSN: [id number]   Address: [address] [place] [address] [place] [address] Phone:    [phone] (home) [phone] (work)   I authorize the entity listed below to release/disclose the PHI below to:   [facility]/CORRY Herr and CORRY Herr   Provider or Entity Name:  [facility]   Address   City, State, [facility]   Phone:      Fax:     Reason for request: continuity of care   Information to be released:    [  ] LAST COLONOSCOPY,  including any PATH REPORT and follow-up  [  ] LAST FIT/COLOGUARD RESULT [  ] LAST DEXA  [xxx ] LAST MAMMOGRAM  [  ] LAST PAP  [  ] LAST LABS [  ] RETINA EXAM REPORT  [  ] IMMUNIZATION RECORDS  [  ] Release all info      [  ] Check here and initial the line next to each item to release ALL health information INCLUDING  _____ Care and treatment for drug and / or alcohol abuse  _____ HIV testing, infection status, or AIDS  _____ Genetic Testing    DATES OF SERVICE OR TIME PERIOD TO BE DISCLOSED: _____________  I understand and acknowledge that:  * This Authorization may be revoked at any time by you in writing, except if your health information has already been used or disclosed.  * Your health information that will be used or disclosed as a result of you signing this authorization could be re-disclosed by the recipient. If this occurs, your re-disclosed health information may no longer be protected by State or Federal laws.  * You may refuse to sign this Authorization. Your refusal will not affect your ability to obtain treatment.  * This Authorization becomes effective upon signing and will  on (date) __________.      If no date is indicated, this Authorization will  one (1) year from the signature date.    Name: Dora Ulrich  Pito  Signature: Date:   3/10/2025     PLEASE FAX REQUESTED RECORDS BACK TO: [phone]

## 2025-03-10 NOTE — ASSESSMENT & PLAN NOTE
Continues follow-up with pharmacotherapy with next appointment tomorrow and she will complete A1C. Continues Ozempic 1 mg weekly and Synjardy XR  mg daily. Her father has been ill after recent cholecystectomy complications.  He has been discharged to SNF.  She had recent labs this morning. She has been cutting down the alcohol.  Home blood sugars 120's. She does not eat past 6:30 p.m.  She is going to check her last eye exam paperwork at [facility] in the last 3 weeks, reports no retinopathy.  She will send us a copy of her eye exam.  She has an upcoming vacation planned.

## 2025-03-10 NOTE — ASSESSMENT & PLAN NOTE
BP today 134/68.  Continues lisinopril-hydrochlorothiazide 20-25 mg daily.  She completed her labs this morning.

## 2025-03-10 NOTE — PROGRESS NOTES
Subjective:     CC: Follow-up    HPI:   Dora presents today with the following:    Type 2 diabetes mellitus without complication, without long-term current use of insulin (HCC)  Continues follow-up with pharmacotherapy with next appointment tomorrow and she will complete A1C. Continues Ozempic 1 mg weekly and Synjardy XR  mg daily. Her father has been ill after recent cholecystectomy complications.  He has been discharged to SNF.  She had recent labs this morning. She has been cutting down the alcohol.  Home blood sugars 120's. She does not eat past 6:30 p.m.  She is going to check her last eye exam paperwork at [facility] in the last 3 weeks, reports no retinopathy.  She will send us a copy of her eye exam.  She has an upcoming vacation planned.    Essential hypertension  BP today 134/68.  Continues lisinopril-hydrochlorothiazide 20-25 mg daily.  She completed her labs this morning.    Vitamin D deficiency  Recent labs completed today.  Continues vitamin D3 5000 units daily.  She has discussed with her GYN about night sweats and GYN recommending checking vitamin D level.    Past Medical History:   Diagnosis Date    Anxiety     Diabetes mellitus, type 2 (HCC)     Dyslipidemia     HTN (hypertension)     Hypertension     Obesity 5/22/2019    Sleep apnea        Social History     Tobacco Use    Smoking status: Never    Smokeless tobacco: Never   Vaping Use    Vaping status: Never Used   Substance Use Topics    Alcohol use: Yes     Alcohol/week: 4.2 - 8.4 oz     Types: 7 - 14 Glasses of wine per week     Comment: 2 glasses of wine 2-3 times a week mayb    Drug use: No       Current Outpatient Medications Ordered in Epic   Medication Sig Dispense Refill    TRI-KELLEE 0.01-4-0.05 % Cream APPLY AT BEDTIME TO DISCOLORED SKIN ON FACE FOR 12 WEEKS. AVOID SUNLIGHT.      ketoconazole (NIZORAL) 2 % Cream APPLY TWICE DAILY TO WHITE SPOTS ON BODY FOR 2 WEEKS.      Semaglutide, 1 MG/DOSE, (OZEMPIC, 1 MG/DOSE,) 4  "MG/3ML Solution Pen-injector INJECT 1 MG UNDER THE SKIN EVERY 7 DAYS 3 mL 5    lisinopril-hydrochlorothiazide (PRINZIDE) 20-25 MG per tablet Take 1 Tablet by mouth every day. 90 Tablet 3    Empagliflozin-metFORMIN HCl ER (SYNJARDY XR)  MG TABLET SR 24 HR Take 1 Tablet by mouth every day. 90 Tablet 3    atorvastatin (LIPITOR) 10 MG Tab Take 1 Tablet by mouth every day. 90 Tablet 3    Blood Glucose Monitoring Suppl Device Meter: Dispense Device of Insurance Preference. Sig. Use as directed for blood sugar monitoring. #1. NR. 1 Each 0    Blood Glucose Test Strips Test strips order: Test strips for One Touch Ultra 2 meter. Sig: use twice daily and prn ssx high or low sugar #100 RF x 3 100 Strip 3    tretinoin (RETIN-A) 0.05 % cream   2    Triamcinolone Acetonide (NASACORT AQ NA) Spray  in nose.      Cholecalciferol (VITAMIN D PO) Take 5,000 Caps by mouth.      Lancets Misc 1 Each by Does not apply route 3 times a day. 100 Each 2     No current Epic-ordered facility-administered medications on file.       Allergies:  Patient has no known allergies.    Health Maintenance: She will send us her recent eye exam results.  Urine microalbumin, CMP and lipid panel completed this morning.  We are requesting her mammogram records from [facility].  Declines pneumonia vaccine today she is going on vacation.      Objective:     Vital signs reviewed  Exam:  /68 (BP Location: Right arm, Patient Position: Sitting, BP Cuff Size: Adult)   Pulse 85   Temp 36.8 °C (98.2 °F) (Temporal)   Ht 1.6 m (5' 3\")   Wt 71.2 kg (157 lb)   SpO2 98%   BMI 27.81 kg/m²  Body mass index is 27.81 kg/m².    Gen: Alert and oriented, No apparent distress.  Neck: Neck is supple without lymphadenopathy.  Lungs: Normal effort, CTA bilaterally, no wheezes, rhonchi, or rales  CV: Regular rate and rhythm. No murmurs, rubs, or gallops.    Assessment & Plan:     54 y.o. female with the following -     1. Type 2 diabetes mellitus without " complication, without long-term current use of insulin (HCC)  Chronic stable problem.  Continue follow-up with pharmacotherapy.  Continue to wear sugar and carbohydrate intake.  Continue exercise.  Continue Ozempic 1 mg weekly and Synjardy XR  mg daily.  Await labs that she completed today.  - Empagliflozin-metFORMIN HCl ER (SYNJARDY XR)  MG TABLET SR 24 HR; Take 1 Tablet by mouth every day.  Dispense: 90 Tablet; Refill: 3    2. Essential hypertension  Chronic stable problem.  Continue lisinopril-hydrochlorothiazide 20-25 mg daily.  - lisinopril-hydrochlorothiazide (PRINZIDE) 20-25 MG per tablet; Take 1 Tablet by mouth every day.  Dispense: 90 Tablet; Refill: 3    3. Multiple thyroid nodules  Chronic stable problem.  Continue follow-up with ENT.  TSH was ordered and we clarified with lab and lab was able to add TSH to her morning labs that she completed today.    4. Vitamin D deficiency  Chronic stable problem.  Continue vitamin D3 5000 units daily.    5. Dyslipidemia  Chronic stable problem.  Continue atorvastatin 10 mg daily.  - atorvastatin (LIPITOR) 10 MG Tab; Take 1 Tablet by mouth every day.  Dispense: 90 Tablet; Refill: 3      Return in about 6 months (around 9/10/2025) for annual.    Please note that this dictation was created using voice recognition software. I have made every reasonable attempt to correct obvious errors, but I expect that there are errors of grammar and possibly content that I did not discover before finalizing the note.

## 2025-03-11 ENCOUNTER — RESULTS FOLLOW-UP (OUTPATIENT)
Dept: MEDICAL GROUP | Facility: PHYSICIAN GROUP | Age: 55
End: 2025-03-11

## 2025-03-11 ENCOUNTER — OFFICE VISIT (OUTPATIENT)
Dept: MEDICAL GROUP | Facility: PHYSICIAN GROUP | Age: 55
End: 2025-03-11
Payer: COMMERCIAL

## 2025-03-11 DIAGNOSIS — E11.9 TYPE 2 DIABETES MELLITUS WITHOUT COMPLICATION, WITHOUT LONG-TERM CURRENT USE OF INSULIN (HCC): ICD-10-CM

## 2025-03-11 LAB
CREAT UR-MCNC: 165 MG/DL
HBA1C MFR BLD: 6.7 % (ref ?–5.8)
MICROALBUMIN UR-MCNC: 1.3 MG/DL
MICROALBUMIN/CREAT UR: 8 MG/G (ref 0–30)
POCT INT CON NEG: NEGATIVE
POCT INT CON POS: POSITIVE

## 2025-03-11 PROCEDURE — 99401 PREV MED CNSL INDIV APPRX 15: CPT | Performed by: NURSE PRACTITIONER

## 2025-03-11 PROCEDURE — 83036 HEMOGLOBIN GLYCOSYLATED A1C: CPT | Performed by: NURSE PRACTITIONER

## 2025-03-11 NOTE — PROGRESS NOTES
Patient Consult Note - Follow Up Visit  Primary care physician: CORRY Herr    Reason for consult: Management of Uncontrolled Type 2 Diabetes    Time IN:  7:31am  Time OUT:  7:49am    HPI:  Dora Sheldon is a 54 y.o. old patient who comes in today for evaluation of above stated problem.    Most Recent HbA1c:   Lab Results   Component Value Date/Time    HBA1C 6.7 (A) 03/11/2025 07:35 AM      Reliable and Exact Care Diabetes Score    Displays the Diabetes REC Score.  A patient gets 1 point for each measure for a maximum of 7 points   1  Measures Not Met      Factor Value   REC DM SCORE - Patient is not overdue for a Retinal Exam Not Met   Last Eye Exam Date 12/30/2022             Current Diabetes Regimen:  GLP-1 Agent: Ozempic 1mg SQ once weekly  Metformin + SGLT-2 Inhibitor: Synjardy XR 25-1000mg QD     Previously attempted medications  Glipizide - unknown reason for d/c  Trulicity - backorder, changed to Ozempic    Discussed FBG goal of , 2hrPP <180, and A1c <7.0%.  Before Breakfast: not currently testing with busy schedule and high stress levels  Before Lunch:  Before Dinner:  Before Bedtime:  Other times:  Hypoglycemia:  None    Lifestyle:  Extremely high stress levels last couple months with father in/out of hospital, her tax season at work, and family visiting.  She admits that nutrition has not been optimized that last six weeks or so.  Exercise has been diminished, but she has been trying to keep up with occasional walking.    Previous visit notes:  12/2024  Continues to be mindful on nutrition.  Cutting back on alcohol intake.  Finds that certain foods no longer appealing, trouble with rich foods and pastas.  Keeping portions under good control.  Weight stable at this time.  Exercise sporadic, but trying to concentrate on getting back to consistent walking and/or gym work.    Preventative Management  BP regimen (ACEi/ARB): Lisinopril-HCTZ 20-25mg QD  BP <140/90: Yes  Statin:  atorvastatin (Lipitor) 10 mg daily  LDL <100: Yes  Lab Results   Component Value Date/Time    CHOLSTRLTOT 152 03/10/2025 06:57 AM    LDL 76 03/10/2025 06:57 AM    HDL 56 03/10/2025 06:57 AM    TRIGLYCERIDE 98 03/10/2025 06:57 AM       Last Microalbumin/Cr:  Lab Results   Component Value Date/Time    MALBCRT 8 03/10/2025 06:57 AM    MICROALBUR 1.3 03/10/2025 06:57 AM     Last A1c:  Lab Results   Component Value Date/Time    HBA1C 6.7 (A) 03/11/2025 07:35 AM        Monofilament exam: up to date, last done 9/2024     REC DM Score: 1  Care gaps addressed:   Retinal exam overdue:  Records request sent to [facility] (last seen 11/2024)  Care gaps updated in Health Maintenance      ROS:  Constitutional: No weight loss  Cardiac: No palpitations or racing heart  Resp: No shortness of breath  Neuro: No numbness or tinging in feet  Endo: No heat or cold intolerance, no polyuria or polydipsia  All other systems were reviewed and were negative.    Past Medical History:  Patient Active Problem List    Diagnosis Date Noted    Menopause 11/06/2023    Multiple thyroid nodules 07/20/2018    Fatty liver 06/22/2018    Chronic dryness of both eyes 12/27/2016    Vitamin D deficiency 09/22/2016    TED on CPAP 03/23/2016    Hyperactivity (behavior)     Type 2 diabetes mellitus without complication, without long-term current use of insulin (HCC)     Essential hypertension     Dyslipidemia        Past Surgical History:  Past Surgical History:   Procedure Laterality Date    HYSTEROSCOPY NOVASURE-2  2010    Dr. Sandoval    ABDOMINOPLASTY  1999    Dr. Ricci    TONSILLECTOMY         Allergies:  Patient has no known allergies.    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[place]  [employer]     Employer: OTHER   Tobacco Use    Smoking status: Never    Smokeless tobacco: Never   Vaping Use    Vaping status: Never Used   Substance and Sexual Activity    Alcohol use: Yes     Alcohol/week: 4.2 - 8.4 oz     Types: 7 - 14 Glasses of wine per week     Comment: 2 glasses of wine 2-3 times a week mayb    Drug use: No    Sexual activity: Yes     Partners: Male     Birth control/protection: Post-Menopausal     Comment: Significant other   Other Topics Concern    Not on file   Social History Narrative    Not on file     Social Drivers of Health     Financial Resource Strain: Not on file   Food Insecurity: Not on file   Transportation Needs: Not on file   Physical Activity: Sufficiently Active (12/27/2021)    Exercise Vital Sign     Days of Exercise per Week: 3 days     Minutes of Exercise per Session: 50 min   Stress: No Stress Concern Present (12/27/2021)    [employer] - Occupational Stress Questionnaire     Feeling of Stress : Only a little   Social Connections: Unknown (12/27/2021)    Social Connection and Isolation Panel [NHANES]     Frequency of Communication with Friends and Family: Not on file     Frequency of Social Gatherings with Friends and Family: Not on file     Attends Restorationism Services: Not on file     Active Member of Clubs or Organizations: No     Attends Club or Organization Meetings: Not on file     Marital Status: Not on file   Intimate Partner Violence: Not on file   Housing Stability: Not on file       Family History:  Family History   Problem Relation Age of Onset    Hypertension Mother     Hypertension Father     Heart Attack Father     Psychiatric Illness Paternal Uncle     Heart Disease Maternal Grandmother     Hypertension Maternal Grandmother     Heart Disease Maternal Grandfather     Hypertension Maternal Grandfather     Diabetes Maternal Uncle     Sleep Apnea Neg Hx        Medications:    Current Outpatient Medications:     TRI-KELLEE 0.01-4-0.05 %  Cream, APPLY AT BEDTIME TO DISCOLORED SKIN ON FACE FOR 12 WEEKS. AVOID SUNLIGHT., Disp: , Rfl:     ketoconazole (NIZORAL) 2 % Cream, APPLY TWICE DAILY TO WHITE SPOTS ON BODY FOR 2 WEEKS., Disp: , Rfl:     atorvastatin (LIPITOR) 10 MG Tab, Take 1 Tablet by mouth every day., Disp: 90 Tablet, Rfl: 3    Empagliflozin-metFORMIN HCl ER (SYNJARDY XR)  MG TABLET SR 24 HR, Take 1 Tablet by mouth every day., Disp: 90 Tablet, Rfl: 3    lisinopril-hydrochlorothiazide (PRINZIDE) 20-25 MG per tablet, Take 1 Tablet by mouth every day., Disp: 90 Tablet, Rfl: 3    Semaglutide, 1 MG/DOSE, (OZEMPIC, 1 MG/DOSE,) 4 MG/3ML Solution Pen-injector, INJECT 1 MG UNDER THE SKIN EVERY 7 DAYS, Disp: 3 mL, Rfl: 5    Blood Glucose Monitoring Suppl Device, Meter: Dispense Device of Insurance Preference. Sig. Use as directed for blood sugar monitoring. #1. NR., Disp: 1 Each, Rfl: 0    Blood Glucose Test Strips, Test strips order: Test strips for One Touch Ultra 2 meter. Sig: use twice daily and prn ssx high or low sugar #100 RF x 3, Disp: 100 Strip, Rfl: 3    tretinoin (RETIN-A) 0.05 % cream, , Disp: , Rfl: 2    Triamcinolone Acetonide (NASACORT AQ NA), Spray  in nose., Disp: , Rfl:     Cholecalciferol (VITAMIN D PO), Take 5,000 Caps by mouth., Disp: , Rfl:     Lancets Misc, 1 Each by Does not apply route 3 times a day., Disp: 100 Each, Rfl: 2    Labs: Reviewed    Physical Examination:  Vital signs: There were no vitals taken for this visit. There is no height or weight on file to calculate BMI.  General: No apparent distress, cooperative  Eyes: No scleral icterus or discharge  ENMT: Normal on external inspection of nose, lips, normal thyroid exam  Neck: No abnormal masses on inspection  Resp: Normal effort, clear to auscultation bilaterally   CVS: Regular rate and rhythm, S1 S2 normal, no murmur   Extremities: No edema  Abdomen: abdominal obesity present  Neuro: Alert and oriented  Skin: No rash  Psych: Normal mood and affect, intact memory  and able to make informed decisions    Assessment and Plan:    Basic physiology of DMII was explained to patient as well as microvascular/macrovascular complications. The importance of increasing physical activity to improve diabetes control was discussed with the patient. Patient was also educated on changing diet and making better choices to help control blood sugar.    Patient is optimized on Synjardy.  Tolerating current dose of Ozempic.  Has not been testing FBG as stress levels have been high and has not focused on this.  A1c drawn today, has worsened a bit to 6.7% (previously 6.4%), but still at goal for her.     Nutrition and exercise compromised secondary to high stress levels last couple months.  Father has been in/out of hospital and SNF, she is at peak tax season at work, and had several family members visiting in town.  She feels that once this period is behind her, she will be able to refocus on lifestyle measures.  Trying to walk on semi-consistent basis.     Will continue all current medications for now.  Stressed importance of optimal nutrition habits, maintain good portion control, continue to focus on low carb/high protein habits.  Try to keep work stress levels low.  Continue to increase exercise, would highly recommend at least 150-180 min/week.    Follow Up:  Three months for A1c    Thank you for allowing me to participate in the care of this patient.    Greyson Arreaga, PharmD, BCACP  03/11/25    CC:   CORRY Herr

## 2025-04-03 ENCOUNTER — TELEPHONE (OUTPATIENT)
Dept: VASCULAR LAB | Facility: MEDICAL CENTER | Age: 55
End: 2025-04-03
Payer: COMMERCIAL

## 2025-04-03 NOTE — TELEPHONE ENCOUNTER
Received Refill request via EMR for Semaglutide, 1 MG/DOSE, (OZEMPIC, 1 MG/DOSE,) 4 MG/3ML Solution Pen-injector. (Quantity:3 mls, Day Supply:30)     Insurance: [employer] [facility]   Member ID:  [id number]  BIN: [id number]  PCN: [id number]  Group: [id number]     Ran Test claim via [place] & medication  pays for $210.79 copay. Medication was already been released to [id number] [place] [address]     VANESSA Solorzano, PhT  Vascular Pharmacy Liaison (Rx Coordinator)  P: [phone]  4/3/2025 1:59 PM

## 2025-04-04 ENCOUNTER — DOCUMENTATION (OUTPATIENT)
Dept: MEDICAL GROUP | Facility: PHYSICIAN GROUP | Age: 55
End: 2025-04-04
Payer: COMMERCIAL

## 2025-04-04 NOTE — PROGRESS NOTES
Records request sent to [facility] @ [phone] for latest retinal scan.  Fax scanned to media.  Greyson Arreaga, PharmD, BCACP

## 2025-04-16 ENCOUNTER — TELEPHONE (OUTPATIENT)
Dept: MEDICAL GROUP | Facility: PHYSICIAN GROUP | Age: 55
End: 2025-04-16

## 2025-04-17 NOTE — TELEPHONE ENCOUNTER
DOCUMENTATION OF PAR STATUS:    1. Name of Medication & Dose: ozempic     2. Name of Prescription Coverage Company & phone #: [employer] [place]     3. Date Prior Auth Submitted: 4/16/25    4. What information was given to obtain insurance decision? Chartnotes    5. Prior Auth Status? Pending    6. Patient Notified: N\A

## 2025-05-16 ENCOUNTER — TELEPHONE (OUTPATIENT)
Dept: HEALTH INFORMATION MANAGEMENT | Facility: OTHER | Age: 55
End: 2025-05-16
Payer: COMMERCIAL

## 2025-07-08 ENCOUNTER — APPOINTMENT (OUTPATIENT)
Dept: MEDICAL GROUP | Facility: PHYSICIAN GROUP | Age: 55
End: 2025-07-08
Payer: COMMERCIAL

## 2025-07-08 VITALS — HEIGHT: 63 IN | HEART RATE: 82 BPM | RESPIRATION RATE: 16 BRPM | OXYGEN SATURATION: 97 % | BODY MASS INDEX: 27.82 KG/M2

## 2025-07-08 DIAGNOSIS — E11.9 TYPE 2 DIABETES MELLITUS WITHOUT COMPLICATION, WITHOUT LONG-TERM CURRENT USE OF INSULIN (HCC): Primary | ICD-10-CM

## 2025-07-08 LAB
HBA1C MFR BLD: 5.9 % (ref ?–5.8)
POCT INT CON NEG: NEGATIVE
POCT INT CON POS: POSITIVE

## 2025-07-08 PROCEDURE — 99402 PREV MED CNSL INDIV APPRX 30: CPT | Performed by: NURSE PRACTITIONER

## 2025-07-08 PROCEDURE — 83036 HEMOGLOBIN GLYCOSYLATED A1C: CPT | Performed by: NURSE PRACTITIONER

## 2025-07-08 NOTE — PROGRESS NOTES
Patient Consult Note - Follow Up Visit  Primary care physician: CORRY Herr    Reason for consult: Management of Uncontrolled Type 2 Diabetes    Time IN:  7:29am  Time OUT:  7:49am    HPI:  Dora Sheldon is a 54 y.o. old patient who comes in today for evaluation of above stated problem.    Most Recent HbA1c:   Lab Results   Component Value Date/Time    HBA1C 5.9 (A) 07/08/2025 07:32 AM      Reliable and Exact Care Diabetes Score    Displays the Diabetes REC Score.  A patient gets 1 point for each measure for a maximum of 7 points   0  Measures Not Met          Current Diabetes Regimen:  GLP-1 Agent: Ozempic 1mg SQ once weekly  Metformin + SGLT-2 Inhibitor: Synjardy XR 25-1000mg QD     Previously attempted medications  Glipizide - unknown reason for d/c  Trulicity - backorder, changed to Ozempic    Discussed FBG goal of , 2hrPP <180, and A1c <7.0%.  Before Breakfast: not testing   Before Lunch:  Before Dinner:  Before Bedtime:  Other times:  Hypoglycemia:  None    Lifestyle:  Continues to have multiple life stresses.  Admits to lacking in hydration, especially over last month or so.  She has found herself consuming more fruit on regular basis.  Meals and timing of meals quite erratic.  No consistent exercise.    Previous visit notes:  3/2025  Extremely high stress levels last couple months with father in/out of hospital, her tax season at work, and family visiting.  She admits that nutrition has not been optimized that last six weeks or so.  Exercise has been diminished, but she has been trying to keep up with occasional walking.    12/2024  Continues to be mindful on nutrition.  Cutting back on alcohol intake.  Finds that certain foods no longer appealing, trouble with rich foods and pastas.  Keeping portions under good control.  Weight stable at this time.  Exercise sporadic, but trying to concentrate on getting back to consistent walking and/or gym work.    Preventative Management  BP  regimen (ACEi/ARB): Lisinopril-HCTZ 20-25mg QD  BP <140/90: Yes  Statin: atorvastatin (Lipitor) 10 mg daily  LDL <100: Yes  Lab Results   Component Value Date/Time    CHOLSTRLTOT 152 03/10/2025 06:57 AM    LDL 76 03/10/2025 06:57 AM    HDL 56 03/10/2025 06:57 AM    TRIGLYCERIDE 98 03/10/2025 06:57 AM       Last Microalbumin/Cr:  Lab Results   Component Value Date/Time    MALBCRT 8 03/10/2025 06:57 AM    MICROALBUR 1.3 03/10/2025 06:57 AM     Last A1c:  Lab Results   Component Value Date/Time    HBA1C 5.9 (A) 07/08/2025 07:32 AM        Monofilament exam: up to date, last done 9/2024     REC DM Score: 0  Care gaps addressed:   N/A  Care gaps updated in Health Maintenance      ROS:  Constitutional: No weight loss  Cardiac: No palpitations or racing heart  Resp: No shortness of breath  Neuro: No numbness or tinging in feet  Endo: No heat or cold intolerance, no polyuria or polydipsia  All other systems were reviewed and were negative.    Past Medical History:  Patient Active Problem List    Diagnosis Date Noted    Menopause 11/06/2023    Multiple thyroid nodules 07/20/2018    Fatty liver 06/22/2018    Chronic dryness of both eyes 12/27/2016    Vitamin D deficiency 09/22/2016    TED on CPAP 03/23/2016    Hyperactivity (behavior)     Type 2 diabetes mellitus without complication, without long-term current use of insulin (HCC)     Essential hypertension     Dyslipidemia        Past Surgical History:  Past Surgical History:   Procedure Laterality Date    HYSTEROSCOPY WITH ENDOMETRIAL RADIOFREQUENCY ABLATION  2010    Dr. Sandoval    ABDOMINOPLASTY  1999    Dr. Ricci    TONSILLECTOMY         Allergies:  Patient has no known allergies.    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[employer]     Employer: OTHER   Tobacco Use    Smoking status: Never    Smokeless tobacco: Never   Vaping Use    Vaping status: Never Used   Substance and Sexual Activity    Alcohol use: Yes     Alcohol/week: 4.2 - 8.4 oz     Types: 7 - 14 Glasses of wine per week     Comment: 2 glasses of wine 2-3 times a week mayb    Drug use: No    Sexual activity: Yes     Partners: Male     Birth control/protection: Post-Menopausal     Comment: Significant other   Other Topics Concern    Not on file   Social History Narrative    Not on file     Social Drivers of Health     Financial Resource Strain: Not on file   Food Insecurity: Not on file   Transportation Needs: Not on file   Physical Activity: Sufficiently Active (12/27/2021)    Exercise Vital Sign     Days of Exercise per Week: 3 days     Minutes of Exercise per Session: 50 min   Stress: No Stress Concern Present (12/27/2021)    [employer] [place] [employer] - Occupational Stress Questionnaire     Feeling of Stress : Only a little   Social Connections: Unknown (12/27/2021)    Social Connection and Isolation Panel [NHANES]     Frequency of Communication with Friends and Family: Not on file     Frequency of Social Gatherings with Friends and Family: Not on file     Attends Restorationism Services: Not on file     Active Member of Clubs or Organizations: No     Attends Club or Organization Meetings: Not on file     Marital Status: Not on file   Intimate Partner Violence: Not on file   Housing Stability: Not on file       Family History:  Family History   Problem Relation Age of Onset    Hypertension Mother     Hypertension Father     Heart Attack Father     Psychiatric Illness Paternal Uncle     Heart Disease Maternal Grandmother     Hypertension Maternal Grandmother     Heart Disease Maternal Grandfather     Hypertension Maternal Grandfather     Diabetes Maternal Uncle     Sleep Apnea Neg Hx        Medications:    Current Outpatient Medications:     TRI-KELLEE 0.01-4-0.05 %  "Cream, APPLY AT BEDTIME TO DISCOLORED SKIN ON FACE FOR 12 WEEKS. AVOID SUNLIGHT., Disp: , Rfl:     ketoconazole (NIZORAL) 2 % Cream, APPLY TWICE DAILY TO WHITE SPOTS ON BODY FOR 2 WEEKS., Disp: , Rfl:     atorvastatin (LIPITOR) 10 MG Tab, Take 1 Tablet by mouth every day., Disp: 90 Tablet, Rfl: 3    Empagliflozin-metFORMIN HCl ER (SYNJARDY XR)  MG TABLET SR 24 HR, Take 1 Tablet by mouth every day., Disp: 90 Tablet, Rfl: 3    lisinopril-hydrochlorothiazide (PRINZIDE) 20-25 MG per tablet, Take 1 Tablet by mouth every day., Disp: 90 Tablet, Rfl: 3    Semaglutide, 1 MG/DOSE, (OZEMPIC, 1 MG/DOSE,) 4 MG/3ML Solution Pen-injector, INJECT 1 MG UNDER THE SKIN EVERY 7 DAYS, Disp: 3 mL, Rfl: 5    Blood Glucose Monitoring Suppl Device, Meter: Dispense Device of Insurance Preference. Sig. Use as directed for blood sugar monitoring. #1. NR., Disp: 1 Each, Rfl: 0    Blood Glucose Test Strips, Test strips order: Test strips for One Touch Ultra 2 meter. Sig: use twice daily and prn ssx high or low sugar #100 RF x 3, Disp: 100 Strip, Rfl: 3    tretinoin (RETIN-A) 0.05 % cream, , Disp: , Rfl: 2    Triamcinolone Acetonide (NASACORT AQ NA), Spray  in nose., Disp: , Rfl:     Cholecalciferol (VITAMIN D PO), Take 5,000 Caps by mouth., Disp: , Rfl:     Lancets Misc, 1 Each by Does not apply route 3 times a day., Disp: 100 Each, Rfl: 2    Labs: Reviewed    Physical Examination:  Vital signs: Pulse 82   Resp 16   Ht 1.6 m (5' 2.99\")   SpO2 97%   BMI 27.82 kg/m²  Body mass index is 27.82 kg/m².  General: No apparent distress, cooperative  Eyes: No scleral icterus or discharge  ENMT: Normal on external inspection of nose, lips, normal thyroid exam  Neck: No abnormal masses on inspection  Resp: Normal effort, clear to auscultation bilaterally   CVS: Regular rate and rhythm, S1 S2 normal, no murmur   Extremities: No edema  Abdomen: abdominal obesity present  Neuro: Alert and oriented  Skin: No rash  Psych: Normal mood and affect, " intact memory and able to make informed decisions    Assessment and Plan:    Basic physiology of DMII was explained to patient as well as microvascular/macrovascular complications. The importance of increasing physical activity to improve diabetes control was discussed with the patient. Patient was also educated on changing diet and making better choices to help control blood sugar.    Patient is optimized on Synjardy.  Tolerating current dose of Ozempic.  Has not been testing FBG as stress levels have been high and has not focused on this.  A1c drawn today, has improved to 5.9% (previously 6.7% 3/2025), which is well within goal for her.     Nutrition and exercise compromised secondary to high stress levels last couple months.  Multiple life stresses have contributed to lack of adequate hydration, erratic meal times, and increase in fruit intake as it is easy to eat on the go.  Has not integrated any exercise at this time, but she expresses desire to do so going forward.     Will continue all current medications for now.  Stressed importance of optimal nutrition habits.  Encouraged her to focus on staying well hydrated on regular basis.  Integrate more protein on regular basis, especially first thing in the morning.  Try to have a bit more structure to meal times.  Continue to increase exercise, would highly recommend at least 150-180 min/week.    Follow Up:  Six months for A1c    Thank you for allowing me to participate in the care of this patient.    Greyson Arreaga, PharmD, [facility]  07/08/2025    CC:   CORRY Herr

## 2025-07-17 DIAGNOSIS — E11.9 TYPE 2 DIABETES MELLITUS WITHOUT COMPLICATION, WITHOUT LONG-TERM CURRENT USE OF INSULIN (HCC): ICD-10-CM

## 2025-07-17 RX ORDER — SEMAGLUTIDE 1.34 MG/ML
1 INJECTION, SOLUTION SUBCUTANEOUS
Qty: 3 ML | Refills: 5 | Status: SHIPPED | OUTPATIENT
Start: 2025-07-17